# Patient Record
Sex: MALE | Race: WHITE | NOT HISPANIC OR LATINO | Employment: FULL TIME | URBAN - METROPOLITAN AREA
[De-identification: names, ages, dates, MRNs, and addresses within clinical notes are randomized per-mention and may not be internally consistent; named-entity substitution may affect disease eponyms.]

---

## 2017-04-10 ENCOUNTER — THERAPY VISIT (OUTPATIENT)
Dept: PHYSICAL THERAPY | Facility: CLINIC | Age: 57
End: 2017-04-10
Payer: COMMERCIAL

## 2017-04-10 DIAGNOSIS — M54.41 ACUTE RIGHT-SIDED LOW BACK PAIN WITH RIGHT-SIDED SCIATICA: Primary | ICD-10-CM

## 2017-04-10 PROCEDURE — 97110 THERAPEUTIC EXERCISES: CPT | Mod: GP | Performed by: PHYSICAL THERAPIST

## 2017-04-10 PROCEDURE — 97161 PT EVAL LOW COMPLEX 20 MIN: CPT | Mod: GP | Performed by: PHYSICAL THERAPIST

## 2017-04-10 NOTE — PROGRESS NOTES
[facility] Initial Evaluation    Subjective:    Farhat Tejada is a 56 year old male with a lumbar condition.  Condition occurred with:  Insidious onset.  Condition occurred: for unknown reasons.  This is a new condition  Patient reports onset of low back pain and right leg numbness/tingling on or about 3/10/17 with no precipitating event at onset. He reports that he just woke up with the pain one morning. Most recently saw MD for this problem on 4/7/17.    Patient reports pain:  Lumbar spine right.  Radiates to:  Gluteals right, thigh right, lower leg right, knee right and foot right.  Pain is described as sharp and is intermittent and reported as 3/10 (up to 8-9/10 with activity).  Associated symptoms:  Tingling and numbness. Pain is the same all the time.  Symptoms are exacerbated by certain positions, other and walking (sit to stand transition) and relieved by nothing.  Since onset symptoms are gradually improving.  Special tests:  X-ray.  Previous treatment includes other (prednisone).  There was moderate improvement following previous treatment.  General health as reported by patient is good.                                              Objective:    System         Lumbar/SI Evaluation    Lumbar Myotomes:  normal                  Neural Tension/Mobility:      Left side:Slump  negative.     Right side:   Slump  negative.   Lumbar Palpation:  normal          Spinal Segmental Conclusions: Segmental hypomobility T10-L5                                                         Lu Lumbar Evaluation    Posture:  Sitting: poor  Standing: fair  Lordosis: WNL  Lateral Shift: no  Correction of Posture: no effect    Movement Loss:  Flexion (Flex): min and pain  Extension (EXT): mod  Side Glide R (SG R): mod and pain  Side Glide L (SG L): mod and pain  Test Movements:  FIS: During: increases  After: no worse    Repeat FIS: During: no effect  After: worse  Mechanical Response: IncROM  EIS: During: no  effect  After: no effect    Repeat EIS: During: no effect  After: no effect  Mechanical Response: IncROM    EIL: During: increases  After: no worse    Repeat EIL: During: decreases and centralizing  After: better and centralizing  Mechanical Response: IncROM  SGIS R: During: increases  After: no worse      SGIS L: During: increases and peripheralizing  After: no worse                                                 ROS    Assessment/Plan:      Patient is a 56 year old male with lumbar complaints.    Patient has the following significant findings with corresponding treatment plan.                Diagnosis 1:  Low back pain    Pain -  self management, education, directional preference exercise and home program  Decreased ROM/flexibility - manual therapy, therapeutic exercise and home program  Decreased joint mobility - manual therapy, therapeutic exercise and home program  Impaired muscle performance - neuro re-education and home program  Decreased function - therapeutic activities and home program  Impaired posture - neuro re-education and home program    Therapy Evaluation Codes:   1) History comprised of:   Personal factors that impact the plan of care:      None.    Comorbidity factors that impact the plan of care are:      None.     Medications impacting care: None.  2) Examination of Body Systems comprised of:   Body structures and functions that impact the plan of care:      Lumbar spine.   Activity limitations that impact the plan of care are:      Sitting, Walking and Sit to stand.  3) Clinical presentation characteristics are:   Stable/Uncomplicated.  4) Decision-Making    Low complexity using standardized patient assessment instrument and/or measureable assessment of functional outcome.  Cumulative Therapy Evaluation is: Low complexity.    Previous and current functional limitations:  (See Goal Flow Sheet for this information)    Short term and Long term goals: (See Goal Flow Sheet for this information)      Communication ability:  Patient appears to be able to clearly communicate and understand verbal and written communication and follow directions correctly.  Treatment Explanation - The following has been discussed with the patient:   RX ordered/plan of care  Anticipated outcomes  Possible risks and side effects  This patient would benefit from PT intervention to resume normal activities.   Rehab potential is good.    Frequency:  1 X week, once daily  Duration:  for 6 weeks  Discharge Plan:  Achieve all LTG.  Independent in home treatment program.  Reach maximal therapeutic benefit.    Please refer to the daily flowsheet for treatment today, total treatment time and time spent performing 1:1 timed codes.

## 2017-04-10 NOTE — LETTER
[facility] PHYSICAL THERAPY  [address] [address]  [phone]    2017    Re: Farhat Tejada   :   1960  MRN:  [id number]   [facility]IC[facility]    Date of Initial Evaluation:  4/10/2017  Visits:  Rxs Used: 1  Reason for Referral:  Acute right-sided low back pain with right-sided sciatica    EVALUATION SUMMARY    [facility] Initial Evaluation    Subjective:  Farhat Tejada is a 56 year old male with a lumbar condition.  Condition occurred with:  Insidious onset.  Condition occurred: for unknown reasons.  This is a new condition  Patient reports onset of low back pain and right leg numbness/tingling on or about 3/10/17 with no precipitating event at onset. He reports that he just woke up with the pain one morning. Most recently saw MD for this problem on 17.  Patient reports pain:  Lumbar spine right.  Radiates to:  Gluteals right, thigh right, lower leg right, knee right and foot right.  Pain is described as sharp and is intermittent and reported as 3/10 (up to 8-9/10 with activity).  Associated symptoms:  Tingling and numbness. Pain is the same all the time.  Symptoms are exacerbated by certain positions, other and walking (sit to stand transition) and relieved by nothing.  Since onset symptoms are gradually improving.  Special tests:  X-ray.  Previous treatment includes other (prednisone).  There was moderate improvement following previous treatment.  General health as reported by patient is good.                            Objective:  Lumbar/SI Evaluation  Lumbar Myotomes:  normal  Neural Tension/Mobility:    Left side:Slump  negative.   Right side:   Slump  negative.   Lumbar Palpation:  normal  Spinal Segmental Conclusions: Segmental hypomobility T10-L5  Lu Lumbar Evaluation  Posture:  Sitting: poor  Standing: fair  Lordosis: WNL  Lateral Shift: no  Correction of Posture: no  effect  Movement Loss:  Flexion (Flex): min and pain  Extension (EXT): mod  Side Glide R (SG R): mod and pain  Side Glide L (SG L): mod and pain  Test Movements:  FIS: During: increases  After: no worse    Repeat FIS: During: no effect  After: worse  Mechanical Response: IncROM  EIS: During: no effect  After: no effect    Repeat EIS: During: no effect  After: no effect  Mechanical Response: IncROM  EIL: During: increases  After: no worse    Repeat EIL: During: decreases and centralizing  After: better and centralizing  Mechanical Response: IncROM  SGIS R: During: increases  After: no worse    SGIS L: During: increases and peripheralizing  After: no worse      Assessment/Plan:    Patient is a 56 year old male with lumbar complaints.    Patient has the following significant findings with corresponding treatment plan.                Diagnosis 1:  Low back pain    Pain -  self management, education, directional preference exercise and home program  Decreased ROM/flexibility - manual therapy, therapeutic exercise and home program  Decreased joint mobility - manual therapy, therapeutic exercise and home program  Impaired muscle performance - neuro re-education and home program  Decreased function - therapeutic activities and home program  Impaired posture - neuro re-education and home program  Therapy Evaluation Codes:   1) History comprised of:   Personal factors that impact the plan of care:      None.    Comorbidity factors that impact the plan of care are:      None.     Medications impacting care: None.  2) Examination of Body Systems comprised of:   Body structures and functions that impact the plan of care:      Lumbar spine.   Activity limitations that impact the plan of care are:      Sitting, Walking and Sit to stand.  3) Clinical presentation characteristics are:   Stable/Uncomplicated.  4) Decision-Making   Low complexity using standardized patient assessment instrument and/or measureable assessment of  functional outcome.  Cumulative Therapy Evaluation is: Low complexity.  Previous and current functional limitations:  (See Goal Flow Sheet for this information)    Short term and Long term goals: (See Goal Flow Sheet for this information)   Communication ability:  Patient appears to be able to clearly communicate and understand verbal and written communication and follow directions correctly.  Treatment Explanation - The following has been discussed with the patient:   RX ordered/plan of care  Anticipated outcomes  Possible risks and side effects  This patient would benefit from PT intervention to resume normal activities.   Rehab potential is good.  Frequency:  1 X week, once daily  Duration:  for 6 weeks  Discharge Plan:  Achieve all LTG.  Independent in home treatment program.  Reach maximal therapeutic benefit.         Thank you for your referral.    INQUIRIES  Therapist: Nuha ORTIZ [facility] PHYSICAL THERAPY  [address]  [facility] [address]  Phone: [phone]  Fax: [phone]

## 2017-04-13 ENCOUNTER — THERAPY VISIT (OUTPATIENT)
Dept: PHYSICAL THERAPY | Facility: CLINIC | Age: 57
End: 2017-04-13
Payer: COMMERCIAL

## 2017-04-13 DIAGNOSIS — M54.41 ACUTE RIGHT-SIDED LOW BACK PAIN WITH RIGHT-SIDED SCIATICA: ICD-10-CM

## 2017-04-13 PROCEDURE — 97110 THERAPEUTIC EXERCISES: CPT | Mod: GP | Performed by: PHYSICAL THERAPIST

## 2017-04-17 ENCOUNTER — THERAPY VISIT (OUTPATIENT)
Dept: PHYSICAL THERAPY | Facility: CLINIC | Age: 57
End: 2017-04-17
Payer: COMMERCIAL

## 2017-04-17 DIAGNOSIS — M54.41 ACUTE RIGHT-SIDED LOW BACK PAIN WITH RIGHT-SIDED SCIATICA: ICD-10-CM

## 2017-04-17 PROCEDURE — 97110 THERAPEUTIC EXERCISES: CPT | Mod: GP | Performed by: PHYSICAL THERAPIST

## 2017-05-04 ENCOUNTER — THERAPY VISIT (OUTPATIENT)
Dept: PHYSICAL THERAPY | Facility: CLINIC | Age: 57
End: 2017-05-04
Payer: COMMERCIAL

## 2017-05-04 DIAGNOSIS — M54.41 ACUTE RIGHT-SIDED LOW BACK PAIN WITH RIGHT-SIDED SCIATICA: ICD-10-CM

## 2017-05-04 PROCEDURE — 97110 THERAPEUTIC EXERCISES: CPT | Mod: GP | Performed by: PHYSICAL THERAPIST

## 2017-05-04 PROCEDURE — 97140 MANUAL THERAPY 1/> REGIONS: CPT | Mod: GP | Performed by: PHYSICAL THERAPIST

## 2017-05-11 ENCOUNTER — THERAPY VISIT (OUTPATIENT)
Dept: PHYSICAL THERAPY | Facility: CLINIC | Age: 57
End: 2017-05-11
Payer: COMMERCIAL

## 2017-05-11 DIAGNOSIS — M54.41 ACUTE RIGHT-SIDED LOW BACK PAIN WITH RIGHT-SIDED SCIATICA: ICD-10-CM

## 2017-05-11 PROCEDURE — 97110 THERAPEUTIC EXERCISES: CPT | Mod: GP | Performed by: PHYSICAL THERAPIST

## 2017-05-11 PROCEDURE — 97140 MANUAL THERAPY 1/> REGIONS: CPT | Mod: GP | Performed by: PHYSICAL THERAPIST

## 2017-05-18 ENCOUNTER — THERAPY VISIT (OUTPATIENT)
Dept: PHYSICAL THERAPY | Facility: CLINIC | Age: 57
End: 2017-05-18
Payer: COMMERCIAL

## 2017-05-18 DIAGNOSIS — M54.41 ACUTE RIGHT-SIDED LOW BACK PAIN WITH RIGHT-SIDED SCIATICA: ICD-10-CM

## 2017-05-18 PROCEDURE — 97110 THERAPEUTIC EXERCISES: CPT | Mod: GP | Performed by: PHYSICAL THERAPIST

## 2017-05-18 PROCEDURE — 97112 NEUROMUSCULAR REEDUCATION: CPT | Mod: GP | Performed by: PHYSICAL THERAPIST

## 2017-05-22 ENCOUNTER — THERAPY VISIT (OUTPATIENT)
Dept: PHYSICAL THERAPY | Facility: CLINIC | Age: 57
End: 2017-05-22
Payer: COMMERCIAL

## 2017-05-22 DIAGNOSIS — M54.41 ACUTE RIGHT-SIDED LOW BACK PAIN WITH RIGHT-SIDED SCIATICA: ICD-10-CM

## 2017-05-22 PROCEDURE — 97110 THERAPEUTIC EXERCISES: CPT | Mod: GP | Performed by: PHYSICAL THERAPIST

## 2017-05-22 PROCEDURE — 97140 MANUAL THERAPY 1/> REGIONS: CPT | Mod: GP | Performed by: PHYSICAL THERAPIST

## 2017-05-22 NOTE — PROGRESS NOTES
Subjective:    HPI                    Objective:    System    Physical Exam    General     ROS    Assessment/Plan:      PROGRESS  REPORT    Progress reporting period is from 4/10/17 to 5/18/17.       SUBJECTIVE  Subjective changes noted by patient:  Patient reports his pain has been better this week in the leg; he reports he does feel weakness in the legs, especially when he tried running ealier this week. Patient reports increased compliance with repeated extension exercise and has been able to complete this more frequently throughout the day    Current Pain level: 4/10.     Initial Pain level: 3/10.   Changes in function:  Yes (See Goal flowsheet attached for changes in current functional level)  Adverse reaction to treatment or activity: None    OBJECTIVE  Changes noted in objective findings: AROM lumbar flexion Min loss, extension Mod loss. Patient continues to demonstrate with forward flexed posture in sitting, but is improved from beginning of POC. He does have increase pain that peripheralizes with overcorrect position during postural correction and is relieved with slight slump.     ASSESSMENT/PLAN  Updated problem list and treatment plan: Diagnosis 1:  Low back pain    Pain -  self management, education, directional preference exercise and home program  Decreased ROM/flexibility - manual therapy, therapeutic exercise and home program  Impaired muscle performance - neuro re-education and home program  Decreased function - therapeutic activities and home program  Impaired posture - neuro re-education and home program  STG/LTGs have been met or progress has been made towards goals:  Yes (See Goal flow sheet completed today.)  Assessment of Progress: The patient's condition is improving.  Self Management Plans:  Patient has been instructed in a home treatment program.  Patient  has been instructed in self management of symptoms.  I have re-evaluated this patient and find that the nature, scope, duration and  intensity of the therapy is appropriate for the medical condition of the patient.  Farhat continues to require the following intervention to meet STG and LTG's:  PT    Recommendations:  This patient would benefit from continued therapy.     Frequency:  1 X week, once daily  Duration:  for 2 weeks tapering to 2 X a month over 4 weeks        Please refer to the daily flowsheet for treatment today, total treatment time and time spent performing 1:1 timed codes.

## 2017-05-25 ENCOUNTER — THERAPY VISIT (OUTPATIENT)
Dept: PHYSICAL THERAPY | Facility: CLINIC | Age: 57
End: 2017-05-25
Payer: COMMERCIAL

## 2017-05-25 DIAGNOSIS — M54.41 ACUTE RIGHT-SIDED LOW BACK PAIN WITH RIGHT-SIDED SCIATICA: ICD-10-CM

## 2017-05-25 PROCEDURE — 97140 MANUAL THERAPY 1/> REGIONS: CPT | Mod: GP | Performed by: PHYSICAL THERAPIST

## 2017-05-25 PROCEDURE — 97110 THERAPEUTIC EXERCISES: CPT | Mod: GP | Performed by: PHYSICAL THERAPIST

## 2017-06-20 ENCOUNTER — OFFICE VISIT (OUTPATIENT)
Dept: FAMILY MEDICINE | Facility: CLINIC | Age: 57
End: 2017-06-20
Payer: COMMERCIAL

## 2017-06-20 VITALS
TEMPERATURE: 98 F | HEART RATE: 93 BPM | HEIGHT: 70 IN | WEIGHT: 212.4 LBS | BODY MASS INDEX: 30.41 KG/M2 | DIASTOLIC BLOOD PRESSURE: 85 MMHG | OXYGEN SATURATION: 100 % | SYSTOLIC BLOOD PRESSURE: 127 MMHG

## 2017-06-20 DIAGNOSIS — Z09 HOSPITAL DISCHARGE FOLLOW-UP: Primary | ICD-10-CM

## 2017-06-20 DIAGNOSIS — K21.9 GASTROESOPHAGEAL REFLUX DISEASE WITHOUT ESOPHAGITIS: ICD-10-CM

## 2017-06-20 DIAGNOSIS — Z71.89 ADVANCED DIRECTIVES, COUNSELING/DISCUSSION: ICD-10-CM

## 2017-06-20 PROCEDURE — 99214 OFFICE O/P EST MOD 30 MIN: CPT | Performed by: NURSE PRACTITIONER

## 2017-06-20 NOTE — PROGRESS NOTES
"  SUBJECTIVE:                                                    Farhat Tejada is a 56 year old male who presents to clinic today for the following health issues:      ED/UC Followup:    Facility:  [facility]  Date of visit: 6/18/17  Reason for visit: chest pain; dx Acid reflux  Current Status: discharged-no pain       Problem list and histories reviewed & adjusted, as indicated.  Additional history: as documented    Patient Active Problem List   Diagnosis     Acute right-sided low back pain with right-sided sciatica     Advanced directives, counseling/discussion     History reviewed. No pertinent surgical history.    Social History   Substance Use Topics     Smoking status: Never Smoker     Smokeless tobacco: Not on file     Alcohol use Yes     Family History   Problem Relation Age of Onset     Hypertension Mother      DIABETES Father      Hypertension Father          No current outpatient prescriptions on file.     No Known Allergies  BP Readings from Last 3 Encounters:   06/20/17 127/85    Wt Readings from Last 3 Encounters:   06/20/17 212 lb 6.4 oz (96.3 kg)             Labs reviewed in EPIC    Reviewed and updated as needed this visit by clinical staff  Tobacco  Allergies  Meds  Med Hx  Surg Hx  Fam Hx  Soc Hx      Reviewed and updated as needed this visit by Provider         ROS:  Constitutional, HEENT, cardiovascular, pulmonary, GI, , musculoskeletal, neuro, skin, endocrine and psych systems are negative, except as otherwise noted.    OBJECTIVE:                                                    /85  Pulse 93  Temp 98  F (36.7  C) (Oral)  Ht 5' 10.28\" (1.785 m)  Wt 212 lb 6.4 oz (96.3 kg)  SpO2 100%  BMI 30.24 kg/m2  Body mass index is 30.24 kg/(m^2).  GENERAL: healthy, alert and no distress  RESP: lungs clear to auscultation - no rales, rhonchi or wheezes  CV: regular rate and rhythm, normal S1 S2, no S3 or S4, no murmur, click or rub, no peripheral edema and peripheral pulses " strong  PSYCH: mentation appears normal, affect normal/bright    Diagnostic Test Results:  none      ASSESSMENT/PLAN:                                                      Discussed EKG-vs- Stress test, symptoms consistent with GERD, pt will let provider know if he decides to have additional testing and discussed with him at hospital discharge. Pt is leaving for [facility] in 2 days. Discussed follow up for any worsening symptoms: chest pain, SOB, fatigue, nausea, jaw pain, arm pain.       ICD-10-CM    1. Hospital discharge follow-up Z09    2. Advanced directives, counseling/discussion Z71.89    3. Gastroesophageal reflux disease without esophagitis K21.9        See Patient Instructions    EDGAR Brody  [facility]

## 2017-06-20 NOTE — NURSING NOTE
"Chief Complaint   Patient presents with     Hospital F/U       Initial /85  Pulse 93  Temp 98  F (36.7  C) (Oral)  Ht 5' 10.28\" (1.785 m)  Wt 212 lb 6.4 oz (96.3 kg)  SpO2 100%  BMI 30.24 kg/m2 Estimated body mass index is 30.24 kg/(m^2) as calculated from the following:    Height as of this encounter: 5' 10.28\" (1.785 m).    Weight as of this encounter: 212 lb 6.4 oz (96.3 kg).  Medication Reconciliation: complete     Leticia Almaraz MA  "

## 2017-06-20 NOTE — PATIENT INSTRUCTIONS
Tips to Control Acid Reflux    To control acid reflux, you ll need to make some basic diet and lifestyle changes. The simple steps outlined below may be all you ll need to ease discomfort.  Watch what you eat    Avoid fatty foods and spicy foods.    Eat fewer acidic foods, such as citrus and tomato-based foods. These can increase symptoms.    Limit drinking alcohol, caffeine, and fizzy beverages. All increase acid reflux.    Try limiting chocolate, peppermint, and spearmint. These can worsen acid reflux in some people.  Watch when you eat    Avoid lying down for 3 hours after eating.    Do not snack before going to bed.  Raise your head  Raising your head and upper body by 4 to 6 inches helps limit reflux when you re lying down. Put blocks under the head of your bed frame to raise it.  Other changes    Lose weight, if you need to    Don t exercise near bedtime    Avoid tight-fitting clothes    Limit aspirin and ibuprofen    Stop smoking   Date Last Reviewed: 7/1/2016 2000-2017 [employer]. [address] [place] [address], [place] [address]. All rights reserved. This information is not intended as a substitute for professional medical care. Always follow your healthcare professional's instructions.        Medicines for Acid Reflux  Your healthcare provider has told you that you have acid reflux. This condition causes stomach acid to wash up into your throat. For most people, acid reflux is troubling but not dangerous. But left untreated, acid reflux sometimes damages the esophagus. Medicines can help control acid reflux and limit your risk of future problems.  Medicines for acid reflux  Your healthcare provider may prescribe medicine to help treat your acid reflux. Medicine will be based on your symptoms and any test results. Your provider will explain how to take your medicine. You will also be told about possible side effects.  Reducing stomach acid  Your provider may suggest antacids that you can buy  over the counter. Antacids can give fast relief. Or you may be told to take a type of medicine called H2 blockers. These are available over the counter and by prescription (for higher doses).  Blocking stomach acid  In more severe cases, your healthcare provider may suggest stronger medicines such as proton pump inhibitors (PPIs). These keep the stomach from making acid. They are often prescribed for long-term use.  Other medicines  In some cases medicines to reduce or block stomach acid may not work. Then you may be switched to another type of medicine that helps your stomach empty better.     Date Last Reviewed: 10/1/2016    [id number] [employer]. [address] [place] [address], [place] [address]. All rights reserved. This information is not intended as a substitute for professional medical care. Always follow your healthcare professional's instructions.

## 2017-06-20 NOTE — Clinical Note
Please abstract the following data from this visit with this patient into the appropriate field in Epic:  Colonoscopy done on this date: 9/13/10 (approximately), by this group: [facility], results were in care everywhere.  Lipid: 6/2/16 Hep c antibody: 6/2/16

## 2017-06-20 NOTE — MR AVS SNAPSHOT
After Visit Summary   6/20/2017    Farhat Tejada    MRN: [id number]           Patient Information     Date Of Birth          1960        Visit Information        Provider Department      6/20/2017 4:00 PM Chelsea Griffin NP [facility]        Today's Diagnoses     Hospital discharge follow-up    -  1    Advanced directives, counseling/discussion        Gastroesophageal reflux disease without esophagitis          Care Instructions      Tips to Control Acid Reflux    To control acid reflux, you ll need to make some basic diet and lifestyle changes. The simple steps outlined below may be all you ll need to ease discomfort.  Watch what you eat    Avoid fatty foods and spicy foods.    Eat fewer acidic foods, such as citrus and tomato-based foods. These can increase symptoms.    Limit drinking alcohol, caffeine, and fizzy beverages. All increase acid reflux.    Try limiting chocolate, peppermint, and spearmint. These can worsen acid reflux in some people.  Watch when you eat    Avoid lying down for 3 hours after eating.    Do not snack before going to bed.  Raise your head  Raising your head and upper body by 4 to 6 inches helps limit reflux when you re lying down. Put blocks under the head of your bed frame to raise it.  Other changes    Lose weight, if you need to    Don t exercise near bedtime    Avoid tight-fitting clothes    Limit aspirin and ibuprofen    Stop smoking   Date Last Reviewed: 7/1/2016 2000-2017 [employer]. [address] [place] [address], [place] [address]. All rights reserved. This information is not intended as a substitute for professional medical care. Always follow your healthcare professional's instructions.        Medicines for Acid Reflux  Your healthcare provider has told you that you have acid reflux. This condition causes stomach acid to wash up into your throat. For most people, acid reflux is troubling but not dangerous. But left untreated, acid  reflux sometimes damages the esophagus. Medicines can help control acid reflux and limit your risk of future problems.  Medicines for acid reflux  Your healthcare provider may prescribe medicine to help treat your acid reflux. Medicine will be based on your symptoms and any test results. Your provider will explain how to take your medicine. You will also be told about possible side effects.  Reducing stomach acid  Your provider may suggest antacids that you can buy over the counter. Antacids can give fast relief. Or you may be told to take a type of medicine called H2 blockers. These are available over the counter and by prescription (for higher doses).  Blocking stomach acid  In more severe cases, your healthcare provider may suggest stronger medicines such as proton pump inhibitors (PPIs). These keep the stomach from making acid. They are often prescribed for long-term use.  Other medicines  In some cases medicines to reduce or block stomach acid may not work. Then you may be switched to another type of medicine that helps your stomach empty better.     Date Last Reviewed: 10/1/2016    6424-6452 [employer]. [address] [place] [address]. All rights reserved. This information is not intended as a substitute for professional medical care. Always follow your healthcare professional's instructions.                Follow-ups after your visit        Follow-up notes from your care team     Return if symptoms worsen or fail to improve.      Who to contact     Normal or non-critical lab and imaging results will be communicated to you by ELVPHDhart, letter or phone within 4 business days after the clinic has received the results. If you do not hear from us within 7 days, please contact the clinic through ELVPHDhart or phone. If you have a critical or abnormal lab result, we will notify you by phone as soon as possible.  Submit refill requests through [employer] Net or call your pharmacy and they will forward  "the refill request to us. Please allow 3 business days for your refill to be completed.          If you need to speak with a  for additional information , please call: [phone]             Additional Information About Your Visit        [email or web address] Information     [email or web address] lets you send messages to your doctor, view your test results, renew your prescriptions, schedule appointments and more. To sign up, go to [email or web address] . Click on \"Log in\" on the left side of the screen, which will take you to the Welcome page. Then click on \"Sign up Now\" on the right side of the page.     You will be asked to enter the access code listed below, as well as some personal information. Please follow the directions to create your username and password.     Your access code is: [email or web address]  Expires: 2017  4:07 PM     Your access code will  in 90 days. If you need help or a new code, please call your [facility] clinic or [phone].        Care EveryWhere ID     This is your Care EveryWhere ID. This could be used by other organizations to access your [facility] medical records  [id number]        Your Vitals Were     Pulse Temperature Height Pulse Oximetry BMI (Body Mass Index)       93 98  F (36.7  C) (Oral) 5' 10.28\" (1.785 m) 100% 30.24 kg/m2        Blood Pressure from Last 3 Encounters:   17 127/85    Weight from Last 3 Encounters:   17 212 lb 6.4 oz (96.3 kg)              Today, you had the following     No orders found for display       Primary Care Provider Office Phone #    Veronica Curtis [place] [phone]       No address on file        Thank you!     Thank you for choosing [facility]  for your care. Our goal is always to provide you with excellent care. Hearing back from our patients is one way we can continue to improve our services. Please take a few minutes to complete the written survey that you may receive in the mail after your visit with us. Thank you!   "           Your Updated Medication List - Protect others around you: Learn how to safely use, store and throw away your medicines at www.disposemymeds.org.      Notice  As of 6/20/2017  4:18 PM    You have not been prescribed any medications.

## 2018-06-18 PROBLEM — M54.41 ACUTE RIGHT-SIDED LOW BACK PAIN WITH RIGHT-SIDED SCIATICA: Status: RESOLVED | Noted: 2017-04-10 | Resolved: 2018-06-18

## 2018-06-18 NOTE — PROGRESS NOTES
Subjective:  HPI                    Objective:  System    Physical Exam    General     ROS    Assessment/Plan:    DISCHARGE REPORT    Progress reporting period as of 5/25/17    SUBJECTIVE  Subjective: Pt reports his pain level is decreased overall from a few weeks ago; does get the pain shooting down the leg when he has to walk/stand for a long time; patient reports he has been trying to work on his posture but does find this difficult because it is an unnatural position for him   Current Pain level: 1/10   Initial Pain level: 3/10   Changes in function: No changes noted in function since last SOAP note   Adverse reactions: None;   ,     Patient has failed to return to therapy so current objective findings are unknown.  The subjective and objective information are from the last SOAP note on this patient.    OBJECTIVE  Objective: AROM lumbar flexion Min loss, extension Mod loss. Patient able to maintain neutral spine position, but does require cues to initially attain this position      ASSESSMENT/PLAN  Updated problem list and treatment plan: Diagnosis 1:   Low back pain    Pain -  home program  Decreased ROM/flexibility - home program  STG/LTGs have been met or progress has been made towards goals:  Yes (See Goal flow sheet completed today.)  Assessment of Progress: The patient has not returned to therapy. Current status is unknown.  Self Management Plans:  Patient has been instructed in a home treatment program.  Patient is independent in a home treatment program.  Patient  has been instructed in self management of symptoms.  Patient is independent in self management of symptoms.  I have re-evaluated this patient and find that the nature, scope, duration and intensity of the therapy is appropriate for the medical condition of the patient.  Farhat continues to require the following intervention to meet STG and LTG's: PT intervention is no longer required to meet STG/LTG.  The patient failed to complete  scheduled/ordered appointments so current information is unknown.  We will discharge this patient from PT.    Recommendations:  This patient is ready to be discharged from therapy and continue their home treatment program.    Please refer to the daily flowsheet for treatment today, total treatment time and time spent performing 1:1 timed codes.

## 2018-08-27 ENCOUNTER — OFFICE VISIT (OUTPATIENT)
Dept: FAMILY MEDICINE | Facility: CLINIC | Age: 58
End: 2018-08-27
Payer: COMMERCIAL

## 2018-08-27 VITALS
OXYGEN SATURATION: 96 % | DIASTOLIC BLOOD PRESSURE: 88 MMHG | HEIGHT: 71 IN | WEIGHT: 218.4 LBS | BODY MASS INDEX: 30.57 KG/M2 | HEART RATE: 75 BPM | SYSTOLIC BLOOD PRESSURE: 124 MMHG | TEMPERATURE: 98.1 F

## 2018-08-27 DIAGNOSIS — R25.2 BILATERAL LEG CRAMPS: ICD-10-CM

## 2018-08-27 DIAGNOSIS — H91.93 DECREASED HEARING OF BOTH EARS: ICD-10-CM

## 2018-08-27 DIAGNOSIS — Z13.1 SCREENING FOR DIABETES MELLITUS: ICD-10-CM

## 2018-08-27 DIAGNOSIS — Z00.00 ROUTINE GENERAL MEDICAL EXAMINATION AT A HEALTH CARE FACILITY: Primary | ICD-10-CM

## 2018-08-27 DIAGNOSIS — H61.21 IMPACTED CERUMEN OF RIGHT EAR: ICD-10-CM

## 2018-08-27 DIAGNOSIS — E66.811 OBESITY (BMI 30.0-34.9): ICD-10-CM

## 2018-08-27 DIAGNOSIS — Z13.6 CARDIOVASCULAR SCREENING; LDL GOAL LESS THAN 160: ICD-10-CM

## 2018-08-27 DIAGNOSIS — R06.83 SNORING: ICD-10-CM

## 2018-08-27 PROCEDURE — 99386 PREV VISIT NEW AGE 40-64: CPT | Mod: 25 | Performed by: PHYSICIAN ASSISTANT

## 2018-08-27 PROCEDURE — 99213 OFFICE O/P EST LOW 20 MIN: CPT | Mod: 25 | Performed by: PHYSICIAN ASSISTANT

## 2018-08-27 PROCEDURE — 69209 REMOVE IMPACTED EAR WAX UNI: CPT | Mod: RT | Performed by: PHYSICIAN ASSISTANT

## 2018-08-27 PROCEDURE — 92551 PURE TONE HEARING TEST AIR: CPT | Performed by: PHYSICIAN ASSISTANT

## 2018-08-27 NOTE — PROGRESS NOTES
SUBJECTIVE:   CC: Farhat Tejada is an 58 year old male who presents for preventative health visit.     Healthy Habits:    Do you get at least three servings of calcium containing foods daily (dairy, green leafy vegetables, etc.)? yes    Amount of exercise or daily activities, outside of work: minimal     Problems taking medications regularly not applicable    Medication side effects: No    Have you had an eye exam in the past two years? no    Do you see a dentist twice per year? yes    Do you have sleep apnea, excessive snoring or daytime drowsiness?yes, he does snore.  No apneic episodes noted.  He did an overnight sleep study at home and was advised to f/u in the lab for further testing.  He does feel well rested most mornings.           His wife is concerned about his hearing.  He thinks he hears just fine, however he would like testing to ensure      HEARING FREQUENCY    Right Ear:      1000 Hz RESPONSE- on Level: 40 db (Conditioning sound)   1000 Hz: RESPONSE- on Level:   20 db    2000 Hz: RESPONSE- on Level:   25 db    4000 Hz: RESPONSE- on Level:   25 db     Left Ear:      4000 Hz: RESPONSE- on Level: 30 db   2000 Hz: RESPONSE- on Level: 30 db   1000 Hz: RESPONSE- on Level:   25 db     500 Hz: RESPONSE- on Level:   25 db     Right Ear:    500 Hz: RESPONSE- on Level: 25 db    Hearing Acuity: Pass    Hearing Assessment: normal      He c/o bilateral leg cramps off and on for over a year.  Has tried drinking more water.  Tried a multivitamin without improvement.      Today's PHQ-2 Score:   PHQ-2 ( 1999 Pfizer) 8/27/2018   Q1: Little interest or pleasure in doing things 0   Q2: Feeling down, depressed or hopeless 0   PHQ-2 Score 0       Abuse: Current or Past(Physical, Sexual or Emotional)- No  Do you feel safe in your environment - Yes    Social History   Substance Use Topics     Smoking status: Never Smoker     Smokeless tobacco: Never Used     Alcohol use Yes      If you drink alcohol do you typically have >3  "drinks per day or >7 drinks per week? No                      Last PSA: No results found for: PSA    Reviewed orders with patient. Reviewed health maintenance and updated orders accordingly - Yes      Reviewed and updated as needed this visit by clinical staff  Tobacco  Allergies  Meds  Med Hx  Surg Hx  Fam Hx  Soc Hx        Reviewed and updated as needed this visit by Provider            ROS:  CONSTITUTIONAL: NEGATIVE for fever, chills, change in weight  INTEGUMENTARY/SKIN: NEGATIVE for worrisome rashes, moles or lesions  EYES: NEGATIVE for vision changes or irritation  ENT: NEGATIVE for ear, mouth and throat problems  RESP: NEGATIVE for significant cough or SOB  CV: NEGATIVE for chest pain, palpitations or peripheral edema  GI: NEGATIVE for nausea, abdominal pain, heartburn, or change in bowel habits   male: negative for dysuria, hematuria, decreased urinary stream, erectile dysfunction, urethral discharge  MUSCULOSKELETAL: NEGATIVE for significant arthralgias or myalgia  NEURO: NEGATIVE for weakness, dizziness or paresthesias  PSYCHIATRIC: NEGATIVE for changes in mood or affect    OBJECTIVE:   /88 (BP Location: Right arm, Cuff Size: Adult Regular)  Pulse 75  Temp 98.1  F (36.7  C) (Tympanic)  Ht 5' 10.75\" (1.797 m)  Wt 218 lb 6.4 oz (99.1 kg)  SpO2 96%  BMI 30.68 kg/m2  EXAM:  GENERAL: healthy, alert and no distress  EYES: Eyes grossly normal to inspection, PERRL and conjunctivae and sclerae normal  HENT: right ear canal with impacted cerumen, left canal normal and TM's normal, nose and mouth without ulcers or lesions  NECK: no adenopathy, no asymmetry, masses, or scars and thyroid normal to palpation  RESP: lungs clear to auscultation - no rales, rhonchi or wheezes  CV: regular rate and rhythm, normal S1 S2, no S3 or S4, no murmur, click or rub, no peripheral edema and peripheral pulses strong  ABDOMEN: soft, nontender, no hepatosplenomegaly, no masses and bowel sounds normal  MS: no gross " "musculoskeletal defects noted, no edema  SKIN: no suspicious lesions or rashes  NEURO: Normal strength and tone, mentation intact and speech normal  PSYCH: mentation appears normal, affect normal/bright    Diagnostic Test Results:  none     ASSESSMENT/PLAN:   1. Routine general medical examination at a health care facility       HEALTH CARE MAINTENANCE              Reviewed USPTF recommendations and anticipatory guidance.              See orders.   Due for shingles vaccine, will stop by pharmacy to get that done.      - Comprehensive metabolic panel; Future    2. Bilateral leg cramps  Will check electrolytes.  I suggest drinking plenty of water and stretching calves in am and pm.   - Magnesium; Future  - Comprehensive metabolic panel; Future    3. CARDIOVASCULAR SCREENING; LDL GOAL LESS THAN 160  Due for screening.   - Lipid panel reflex to direct LDL Fasting; Future    4. Screening for diabetes mellitus  Due for screening.   - Comprehensive metabolic panel; Future    5. Snoring  Suggest further evaluation with a sleep study.   - SLEEP EVALUATION & MANAGEMENT REFERRAL - Critical access hospital -[facility] [place]  [phone] (Age 15 and up); Future    6. Obesity (BMI 30.0-34.9)  Weight loss advised.     7. Impacted cerumen of right ear  Cerumen Impaction    Wax successfully removed from right ear(s) using irrigation.   Hearing improved s/p wax removal.  Tympanic membranes intact s/p wax removal.   Can use OTC debrox PRN in future.        8.  Decreased hearing.  Passed hearing exam.  Wax removed, this may help.       COUNSELING:  Reviewed preventive health counseling, as reflected in patient instructions       Regular exercise       Healthy diet/nutrition       Hearing screening       Colon cancer screening    BP Readings from Last 1 Encounters:   08/27/18 124/88     Estimated body mass index is 30.68 kg/(m^2) as calculated from the following:    Height as of this encounter: 5' 10.75\" (1.797 m).    Weight as of " this encounter: 218 lb 6.4 oz (99.1 kg).      Weight management plan: Discussed healthy diet and exercise guidelines and patient will follow up in 12 months in clinic to re-evaluate.     reports that he has never smoked. He has never used smokeless tobacco.      Counseling Resources:  ATP IV Guidelines  Pooled Cohorts Equation Calculator  FRAX Risk Assessment  ICSI Preventive Guidelines  Dietary Guidelines for Americans, 2010  USDA's MyPlate  ASA Prophylaxis  Lung CA Screening    Helena Magaña PA-C  [facility]

## 2018-08-27 NOTE — MR AVS SNAPSHOT
After Visit Summary   8/27/2018    Farhat Tejada    MRN: [id number]           Patient Information     Date Of Birth          1960        Visit Information        Provider Department      8/27/2018 9:00 AM Helena Magaña PA-C [facility]        Today's Diagnoses     Routine general medical examination at a health care facility    -  1    Bilateral leg cramps        CARDIOVASCULAR SCREENING; LDL GOAL LESS THAN 160        Screening for diabetes mellitus        Snoring          Care Instructions      Preventive Health Recommendations  Male Ages 50 - 64    Yearly exam:             See your health care provider every year in order to  o   Review health changes.   o   Discuss preventive care.    o   Review your medicines if your doctor has prescribed any.     Have a cholesterol test every 5 years, or more frequently if you are at risk for high cholesterol/heart disease.     Have a diabetes test (fasting glucose) every three years. If you are at risk for diabetes, you should have this test more often.     Have a colonoscopy at age 50, or have a yearly FIT test (stool test). These exams will check for colon cancer.      Talk with your health care provider about whether or not a prostate cancer screening test (PSA) is right for you.    You should be tested each year for STDs (sexually transmitted diseases), if you re at risk.     Shots: Get a flu shot each year. Get a tetanus shot every 10 years.     Nutrition:    Eat at least 5 servings of fruits and vegetables daily.     Eat whole-grain bread, whole-wheat pasta and brown rice instead of white grains and rice.     Get adequate Calcium and Vitamin D.     Lifestyle    Exercise for at least 150 minutes a week (30 minutes a day, 5 days a week). This will help you control your weight and prevent disease.     Limit alcohol to one drink per day.     No smoking.     Wear sunscreen to prevent skin cancer.     See your dentist every six months  for an exam and cleaning.     See your eye doctor every 1 to 2 years.    Schedule a sleep study.   Start the shingles vaccine series (2 doses)                Follow-ups after your visit        Additional Services     SLEEP EVALUATION & MANAGEMENT REFERRAL - [facility]  [phone] (Age 15 and up)       Please be aware that coverage of these services is subject to the terms and limitations of your health insurance plan.  Call member services at your health plan with any benefit or coverage questions.      Please bring the following to your appointment:    >>   List of current medications   >>   This referral request   >>   Any documents/labs given to you for this referral                      Future tests that were ordered for you today     Open Future Orders        Priority Expected Expires Ordered    Comprehensive metabolic panel Routine 8/28/2018 8/27/2019 8/27/2018    SLEEP EVALUATION & MANAGEMENT REFERRAL - [facility]  [phone] (Age 15 and up) Routine  8/27/2019 8/27/2018    Lipid panel reflex to direct LDL Fasting Routine 8/28/2018 8/27/2019 8/27/2018    Magnesium Routine 8/28/2018 8/27/2019 8/27/2018            Who to contact     Normal or non-critical lab and imaging results will be communicated to you by MyChart, letter or phone within 4 business days after the clinic has received the results. If you do not hear from us within 7 days, please contact the clinic through MyChart or phone. If you have a critical or abnormal lab result, we will notify you by phone as soon as possible.  Submit refill requests through PureS[employer]et or call your pharmacy and they will forward the refill request to us. Please allow 3 business days for your refill to be completed.          If you need to speak with a  for additional information , please call: [phone]           Additional Information About Your Visit        Care EveryWhere ID      "This is your Care EveryWhere ID. This could be used by other organizations to access your [facility] medical records  [id number]        Your Vitals Were     Pulse Temperature Height Pulse Oximetry BMI (Body Mass Index)       75 98.1  F (36.7  C) (Tympanic) 5' 10.75\" (1.797 m) 96% 30.68 kg/m2        Blood Pressure from Last 3 Encounters:   08/27/18 124/88   06/20/17 127/85    Weight from Last 3 Encounters:   08/27/18 218 lb 6.4 oz (99.1 kg)   06/20/17 212 lb 6.4 oz (96.3 kg)               Primary Care Provider Office Phone # Fax #    Helena Amber Magaña PA-C [phone] [address] [place] [address]        Equal Access to Services     [facility] EM : Hadii pedro yates hadasho Sonatalie, waaxda luqadaha, qaybta kaalmada adeegyada, mary duff . So [facility] [place] [phone].    ATENCIÓN: Si habla español, tiene a amador disposición servicios gratuitos de asistencia lingüística. Gretchename al [phone].    We comply with applicable federal civil rights laws and [place] laws. We do not discriminate on the basis of race, color, national origin, age, disability, sex, sexual orientation, or gender identity.            Thank you!     Thank you for choosing [facility] [place]  for your care. Our goal is always to provide you with excellent care. Hearing back from our patients is one way we can continue to improve our services. Please take a few minutes to complete the written survey that you may receive in the mail after your visit with us. Thank you!             Your Updated Medication List - Protect others around you: Learn how to safely use, store and throw away your medicines at www.disposemymeds.org.      Notice  As of 8/27/2018 10:02 AM    You have not been prescribed any medications.      "

## 2018-08-27 NOTE — PATIENT INSTRUCTIONS
Preventive Health Recommendations  Male Ages 50 - 64    Yearly exam:             See your health care provider every year in order to  o   Review health changes.   o   Discuss preventive care.    o   Review your medicines if your doctor has prescribed any.     Have a cholesterol test every 5 years, or more frequently if you are at risk for high cholesterol/heart disease.     Have a diabetes test (fasting glucose) every three years. If you are at risk for diabetes, you should have this test more often.     Have a colonoscopy at age 50, or have a yearly FIT test (stool test). These exams will check for colon cancer.      Talk with your health care provider about whether or not a prostate cancer screening test (PSA) is right for you.    You should be tested each year for STDs (sexually transmitted diseases), if you re at risk.     Shots: Get a flu shot each year. Get a tetanus shot every 10 years.     Nutrition:    Eat at least 5 servings of fruits and vegetables daily.     Eat whole-grain bread, whole-wheat pasta and brown rice instead of white grains and rice.     Get adequate Calcium and Vitamin D.     Lifestyle    Exercise for at least 150 minutes a week (30 minutes a day, 5 days a week). This will help you control your weight and prevent disease.     Limit alcohol to one drink per day.     No smoking.     Wear sunscreen to prevent skin cancer.     See your dentist every six months for an exam and cleaning.     See your eye doctor every 1 to 2 years.    Schedule a sleep study.   Start the shingles vaccine series (2 doses)

## 2018-09-05 ENCOUNTER — ALLIED HEALTH/NURSE VISIT (OUTPATIENT)
Dept: FAMILY MEDICINE | Facility: CLINIC | Age: 58
End: 2018-09-05
Payer: COMMERCIAL

## 2018-09-05 DIAGNOSIS — Z23 NEED FOR VACCINATION: Primary | ICD-10-CM

## 2018-09-05 DIAGNOSIS — Z00.00 ROUTINE GENERAL MEDICAL EXAMINATION AT A HEALTH CARE FACILITY: ICD-10-CM

## 2018-09-05 DIAGNOSIS — R25.2 BILATERAL LEG CRAMPS: ICD-10-CM

## 2018-09-05 DIAGNOSIS — R74.8 ELEVATED LIVER ENZYMES: ICD-10-CM

## 2018-09-05 DIAGNOSIS — E78.5 HYPERLIPIDEMIA LDL GOAL <160: ICD-10-CM

## 2018-09-05 DIAGNOSIS — Z13.1 SCREENING FOR DIABETES MELLITUS: ICD-10-CM

## 2018-09-05 DIAGNOSIS — Z13.6 CARDIOVASCULAR SCREENING; LDL GOAL LESS THAN 160: ICD-10-CM

## 2018-09-05 DIAGNOSIS — R73.03 PREDIABETES: Primary | ICD-10-CM

## 2018-09-05 LAB
ALBUMIN SERPL-MCNC: 4.3 G/DL (ref 3.4–5)
ALP SERPL-CCNC: 57 U/L (ref 40–150)
ALT SERPL W P-5'-P-CCNC: 100 U/L (ref 0–70)
ANION GAP SERPL CALCULATED.3IONS-SCNC: 4 MMOL/L (ref 3–14)
AST SERPL W P-5'-P-CCNC: 51 U/L (ref 0–45)
BILIRUB SERPL-MCNC: 1.8 MG/DL (ref 0.2–1.3)
BUN SERPL-MCNC: 15 MG/DL (ref 7–30)
CALCIUM SERPL-MCNC: 9.5 MG/DL (ref 8.5–10.1)
CHLORIDE SERPL-SCNC: 102 MMOL/L (ref 94–109)
CHOLEST SERPL-MCNC: 220 MG/DL
CO2 SERPL-SCNC: 30 MMOL/L (ref 20–32)
CREAT SERPL-MCNC: 1.07 MG/DL (ref 0.66–1.25)
GFR SERPL CREATININE-BSD FRML MDRD: 71 ML/MIN/1.7M2
GLUCOSE SERPL-MCNC: 115 MG/DL (ref 70–99)
HDLC SERPL-MCNC: 54 MG/DL
LDLC SERPL CALC-MCNC: 129 MG/DL
MAGNESIUM SERPL-MCNC: 2.1 MG/DL (ref 1.6–2.3)
NONHDLC SERPL-MCNC: 166 MG/DL
POTASSIUM SERPL-SCNC: 4.2 MMOL/L (ref 3.4–5.3)
PROT SERPL-MCNC: 8 G/DL (ref 6.8–8.8)
SODIUM SERPL-SCNC: 136 MMOL/L (ref 133–144)
TRIGL SERPL-MCNC: 184 MG/DL

## 2018-09-05 PROCEDURE — 83735 ASSAY OF MAGNESIUM: CPT | Performed by: PHYSICIAN ASSISTANT

## 2018-09-05 PROCEDURE — 90750 HZV VACC RECOMBINANT IM: CPT

## 2018-09-05 PROCEDURE — 36415 COLL VENOUS BLD VENIPUNCTURE: CPT | Performed by: PHYSICIAN ASSISTANT

## 2018-09-05 PROCEDURE — 99207 ZZC NO CHARGE NURSE ONLY: CPT

## 2018-09-05 PROCEDURE — 80061 LIPID PANEL: CPT | Performed by: PHYSICIAN ASSISTANT

## 2018-09-05 PROCEDURE — 80053 COMPREHEN METABOLIC PANEL: CPT | Performed by: PHYSICIAN ASSISTANT

## 2018-09-05 PROCEDURE — 90471 IMMUNIZATION ADMIN: CPT

## 2018-09-05 NOTE — NURSING NOTE
Prior to injection verified patient identity using patient's name and date of birth.  Due to injection administration, patient instructed to remain in clinic for 15 minutes  afterwards, and to report any adverse reaction to me immediately.    Screening Questionnaire for Adult Immunization    Are you sick today?   No   Do you have allergies to medications, food, a vaccine component or latex?   No   Have you ever had a serious reaction after receiving a vaccination?   No   Do you have a long-term health problem with heart disease, lung disease, asthma, kidney disease, metabolic disease (e.g. diabetes), anemia, or other blood disorder?   No   Do you have cancer, leukemia, HIV/AIDS, or any other immune system problem?   No   In the past 3 months, have you taken medications that affect  your immune system, such as prednisone, other steroids, or anticancer drugs; drugs for the treatment of rheumatoid arthritis, Crohn s disease, or psoriasis; or have you had radiation treatments?   No   Have you had a seizure, or a brain or other nervous system problem?   No   During the past year, have you received a transfusion of blood or blood     products, or been given immune (gamma) globulin or antiviral drug?   No   For women: Are you pregnant or is there a chance you could become        pregnant during the next month?   No   Have you received any vaccinations in the past 4 weeks?   No     Immunization questionnaire answers were all negative.         Screening performed by Anum Jennings on 9/5/2018 at 9:08 AM.

## 2018-09-05 NOTE — LETTER
[facility]  [address] [address]  Phone: [phone]    09/07/18    Farhat Layne [facility] [address]      Farhat        We are writing to inform you of the results from your recent lab work, included is a copy of those results.    Component      Latest Ref Rng & Units 9/5/2018   Sodium      133 - 144 mmol/L 136   Potassium      3.4 - 5.3 mmol/L 4.2   Chloride      94 - 109 mmol/L 102   Carbon Dioxide      20 - 32 mmol/L 30   Anion Gap      3 - 14 mmol/L 4   Glucose      70 - 99 mg/dL 115 (H)   Urea Nitrogen      7 - 30 mg/dL 15   Creatinine      0.66 - 1.25 mg/dL 1.07   GFR Estimate      >60 mL/min/1.7m2 71   GFR Estimate If Black      >60 mL/min/1.7m2 86   Calcium      8.5 - 10.1 mg/dL 9.5   Bilirubin Total      0.2 - 1.3 mg/dL 1.8 (H)   Albumin      3.4 - 5.0 g/dL 4.3   Protein Total      6.8 - 8.8 g/dL 8.0   Alkaline Phosphatase      40 - 150 U/L 57   ALT      0 - 70 U/L 100 (H)   AST      0 - 45 U/L 51 (H)   Cholesterol      <200 mg/dL 220 (H)   Triglycerides      <150 mg/dL 184 (H)   HDL Cholesterol      >39 mg/dL 54   LDL Cholesterol Calculated      <100 mg/dL 129 (H)   Non HDL Cholesterol      <130 mg/dL 166 (H)   Magnesium      1.6 - 2.3 mg/dL 2.1     Your liver function tests are slightly abnormal.  This can be due to alcohol or other medications/supplements in your diet.  Additionally, some infections can cause this. I suggest     reducing intake of alcohol and/or supplements (especially any pain medications such as ibuprofen) for a period of time and rechecking levels in a month.     Your glucose was mildly elevated in the pre-diabetic range (not diabetes).  A healthy diet, regular exercise and maintenance of a healthy weight is the best way to prevent diabetes.  I recommend we continue to monitor this, recheck a fasting blood sugar and an A1C in 1 month.     Your cholesterol levels are also elevated, weight loss and healthy  diet/exercise can help with this.       Your electrolytes and magnesium levels were normal.           Please follow-up after your next set of labs to review.     If you have any questions, please do not hesitate to call our office.        Sincerely,    Helena Magaña PA-C

## 2018-09-05 NOTE — MR AVS SNAPSHOT
After Visit Summary   9/5/2018    Farhat Tejada    MRN: [id number]           Patient Information     Date Of Birth          1960        Visit Information        Provider Department      9/5/2018 9:15 AM Desi/Lpn, Fl [facility]        Today's Diagnoses     Need for vaccination    -  1       Follow-ups after your visit        Who to contact     Normal or non-critical lab and imaging results will be communicated to you by MyChart, letter or phone within 4 business days after the clinic has received the results. If you do not hear from us within 7 days, please contact the clinic through MyChart or phone. If you have a critical or abnormal lab result, we will notify you by phone as soon as possible.  Submit refill requests through Moonf[employer]e or call your pharmacy and they will forward the refill request to us. Please allow 3 business days for your refill to be completed.          If you need to speak with a  for additional information , please call: [phone]           Additional Information About Your Visit        Care EveryWhere ID     This is your Care EveryWhere ID. This could be used by other organizations to access your [place] medical records  [id number]         Blood Pressure from Last 3 Encounters:   08/27/18 124/88   06/20/17 127/85    Weight from Last 3 Encounters:   08/27/18 218 lb 6.4 oz (99.1 kg)   06/20/17 212 lb 6.4 oz (96.3 kg)              We Performed the Following     1st  Administration  [[id number]]     SHINGRIX [[id number]]        Primary Care Provider Office Phone # Fax #    Helena Amber Magaña PA-C [phone] [address]   [place] [facility] [address]        Equal Access to Services     [facility]EN AH: Hadii aad ku hadashjey Sonatalie, waaxda luqadaha, qaybta kaalmada phillip, mary whyte. So [facility] [phone].    ATENCIÓN: Si habla español, tiene a amador disposición servicios gratuitos de asistencia  lingüísticaSarah Huff al [phone].    We comply with applicable federal civil rights laws and [place] laws. We do not discriminate on the basis of race, color, national origin, age, disability, sex, sexual orientation, or gender identity.            Thank you!     Thank you for choosing [facility]  for your care. Our goal is always to provide you with excellent care. Hearing back from our patients is one way we can continue to improve our services. Please take a few minutes to complete the written survey that you may receive in the mail after your visit with us. Thank you!             Your Updated Medication List - Protect others around you: Learn how to safely use, store and throw away your medicines at www.disposemymeds.org.      Notice  As of 9/5/2018  9:18 AM    You have not been prescribed any medications.

## 2018-09-07 PROBLEM — E78.5 HYPERLIPIDEMIA LDL GOAL <160: Status: ACTIVE | Noted: 2018-09-07

## 2018-09-07 PROBLEM — R73.03 PREDIABETES: Status: ACTIVE | Noted: 2018-09-07

## 2018-10-12 PROBLEM — E66.811 OBESITY (BMI 30.0-34.9): Chronic | Status: ACTIVE | Noted: 2018-08-27

## 2018-10-15 ENCOUNTER — OFFICE VISIT (OUTPATIENT)
Dept: SLEEP MEDICINE | Facility: CLINIC | Age: 58
End: 2018-10-15
Attending: PHYSICIAN ASSISTANT
Payer: COMMERCIAL

## 2018-10-15 VITALS
OXYGEN SATURATION: 95 % | HEART RATE: 76 BPM | SYSTOLIC BLOOD PRESSURE: 128 MMHG | WEIGHT: 219 LBS | HEIGHT: 70 IN | DIASTOLIC BLOOD PRESSURE: 84 MMHG | BODY MASS INDEX: 31.35 KG/M2

## 2018-10-15 DIAGNOSIS — R53.81 MALAISE AND FATIGUE: ICD-10-CM

## 2018-10-15 DIAGNOSIS — R06.00 DYSPNEA AND RESPIRATORY ABNORMALITY: Primary | ICD-10-CM

## 2018-10-15 DIAGNOSIS — E66.09 CLASS 1 OBESITY DUE TO EXCESS CALORIES WITH BODY MASS INDEX (BMI) OF 31.0 TO 31.9 IN ADULT, UNSPECIFIED WHETHER SERIOUS COMORBIDITY PRESENT: ICD-10-CM

## 2018-10-15 DIAGNOSIS — Z72.820 LACK OF ADEQUATE SLEEP: ICD-10-CM

## 2018-10-15 DIAGNOSIS — R53.83 MALAISE AND FATIGUE: ICD-10-CM

## 2018-10-15 DIAGNOSIS — R06.89 DYSPNEA AND RESPIRATORY ABNORMALITY: Primary | ICD-10-CM

## 2018-10-15 DIAGNOSIS — E66.811 CLASS 1 OBESITY DUE TO EXCESS CALORIES WITH BODY MASS INDEX (BMI) OF 31.0 TO 31.9 IN ADULT, UNSPECIFIED WHETHER SERIOUS COMORBIDITY PRESENT: ICD-10-CM

## 2018-10-15 DIAGNOSIS — R06.83 SNORING: ICD-10-CM

## 2018-10-15 PROCEDURE — 99244 OFF/OP CNSLTJ NEW/EST MOD 40: CPT | Performed by: PHYSICIAN ASSISTANT

## 2018-10-15 NOTE — PROGRESS NOTES
Sleep Consultation:    Date on this visit: 10/15/2018    Farhat Tejada is sent by Helena Magaña for a sleep consultation regarding snoring.    Primary Physician: Helena Magaña     Chief Complaint   Patient presents with     Sleep Problem     consult- snoring     He reports that he underwent an overnight oximetry in 2016 and was abnormal. He was advised to undergo a formal PSG, but did not do so.     Farhat goes to sleep at 9:30 PM during the week. He wakes up at 9:30 AM without an alarm. He falls asleep in 30 minutes.  Farhat denies difficulty falling asleep.  He wakes up 0-2 times a night for 60-90 minutes before falling back to sleep.  Farhat wakes up to uncertain reasons.  On weekends, Farhat goes to sleep at 11:00 PM.  He wakes up at 7:00 AM without an alarm. He falls asleep in 30 minutes.  Patient gets an average of 6 hours of sleep per night.     Patient does watch TV in bed.     Farhat does not do shift work.      Farhat does snore every night and snoring is very loud. Patient does have a regular bed partner. There is report of snoring.  He does not have witnessed apneas. They occasionally sleep separately.  Patient sleeps on his back and side. He has frequent morning dry mouth, denies no morning headaches, morning confusion. He has mild symptoms of RLS. Farhat denies any bruxism, sleep walking, sleep talking, dream enactment, sleep paralysis, cataplexy and hypnogogic/hypnopompic hallucinations.    Farhat denies difficulty breathing through his nose, claustrophobia and reflux at night.      Farhat has gained 20 pounds in 10 years.  Patient describes themself as a morning person.  He would prefer to go to sleep at 11:00 PM and wake up at 5:00 AM.  Patient's Howell Sleepiness score 8/24 inconsistent with excessive  daytime sleepiness.  He has fatigue.     Farhat does not take naps. He takes some inadvertant naps.  He denies falling asleep while driving.  Patient was counseled on the importance of driving while alert,  to pull over if drowsy, or nap before getting into the vehicle if sleepy.  He uses 1 cups/day of coffee. Last caffeine intake is usually before noon.    Allergies:    No Known Allergies    Medications:    No current outpatient prescriptions on file.       Problem List:  Patient Active Problem List    Diagnosis Date Noted     Prediabetes 2018     Priority: Medium     Elevated liver enzymes 2018     Priority: Medium     Hyperlipidemia LDL goal <160 2018     Priority: Medium     Obesity (BMI 30.0-34.9) 2018     Priority: Medium     CARDIOVASCULAR SCREENING; LDL GOAL LESS THAN 160 2018     Priority: Medium     Advanced directives, counseling/discussion 2017     Priority: Medium     Advance Care Planning 2017: ACP Review of Chart / Resources Provided:  Reviewed chart for advance care plan.  Farhat Tejada has an advance care plan which needs to be updated. Patient states presence of new/updated ACP document. Copy requested  Added by Leticia Almaraz            Snoring 2014     Priority: Medium     Thalassemia 2014     Priority: Medium        Past Medical/Surgical History:  No past medical history on file.  Past Surgical History:   Procedure Laterality Date     VASECTOMY       [facility]         Social History:  Social History     Social History     Marital status:      Spouse name: N/A     Number of children: N/A     Years of education: N/A     Occupational History     Not on file.     Social History Main Topics     Smoking status: Never Smoker     Smokeless tobacco: Never Used     Alcohol use Yes     Drug use: No     Sexual activity: Yes     Partners: Female     Other Topics Concern     Parent/Sibling W/ Cabg, Mi Or Angioplasty Before 65f 55m? Yes     mother  at age 36 from heart condition      Social History Narrative       Family History:  Family History   Problem Relation Age of Onset     Hypertension Mother      Heart Failure Mother 36      "Diabetes Father      Hypertension Father      Cerebrovascular Disease Father 88     Thyroid Cancer Brother      Diabetes Paternal Aunt        Review of Systems:  CONSTITUTIONAL: POSITIVE for weight gain. NEGATIVE for weight gain/loss, fever, chills, sweats or night sweats.  EYES: NEGATIVE for changes in vision, blind spots, double vision.  ENT: NEGATIVE for ear pain, sore throat, sinus pain, post-nasal drip, runny nose, bloody nose  CARDIAC: NEGATIVE for fast heartbeats or fluttering in chest, chest pain or pressure, breathlessness when lying flat, swollen legs or swollen feet.  NEUROLOGIC: NEGATIVE headaches, weakness or numbness in the arms or legs.  DERMATOLOGIC: NEGATIVE for rashes, new moles or change in mole(s)  PULMONARY: POSITIVE for SOB with activity. NEGATIVE SOB at rest, dry cough, productive cough, coughing up blood, wheezing or whistling when breathing.    GASTROINTESTINAL: NEGATIVE for nausea or vomitting, loose or watery stools, fat or grease in stools, constipation, abdominal pain, bowel movements black in color or blood noted.  GENITOURINARY: NEGATIVE for pain during urination, blood in urine, urinating more frequently than usual, irregular menstrual periods.  MUSCULOSKELETAL: NEGATIVE for muscle pain, bone or joint pain, swollen joints.  ENDOCRINE: NEGATIVE for increased thirst or urination, diabetes.  LYMPHATIC: NEGATIVE for swollen lymph nodes, lumps or bumps in the breasts or nipple discharge.    Physical Examination:  Vitals: /84  Pulse 76  Ht 1.778 m (5' 10\")  Wt 99.3 kg (219 lb)  SpO2 95%  BMI 31.42 kg/m2  BMI= Body mass index is 31.42 kg/(m^2).    Neck Cir (cm): 42 cm    Flint Total Score 10/15/2018   Total score - Flint 8     GENERAL APPEARANCE: alert and no distress  EYES: Eyes grossly normal to inspection, PERRL and conjunctivae and sclerae normal  HENT: ear canals and TM's normal, nose and mouth without ulcers or lesions and tongue base enlarged  NECK: no adenopathy and no " asymmetry, masses, or scars  RESP: lungs clear to auscultation - no rales, rhonchi or wheezes  MS: extremities normal- no gross deformities noted  NEURO: Normal strength and tone, mentation intact and speech normal  PSYCH: mentation appears normal and affect normal/bright  Mallampati Class: III.  Tonsillar Stage:     Last Comprehensive Metabolic Panel:  Sodium   Date Value Ref Range Status   09/05/2018 136 133 - 144 mmol/L Final     Potassium   Date Value Ref Range Status   09/05/2018 4.2 3.4 - 5.3 mmol/L Final     Chloride   Date Value Ref Range Status   09/05/2018 102 94 - 109 mmol/L Final     Carbon Dioxide   Date Value Ref Range Status   09/05/2018 30 20 - 32 mmol/L Final     Anion Gap   Date Value Ref Range Status   09/05/2018 4 3 - 14 mmol/L Final     Glucose   Date Value Ref Range Status   09/05/2018 115 (H) 70 - 99 mg/dL Final     Comment:     Fasting specimen     Urea Nitrogen   Date Value Ref Range Status   09/05/2018 15 7 - 30 mg/dL Final     Creatinine   Date Value Ref Range Status   09/05/2018 1.07 0.66 - 1.25 mg/dL Final     GFR Estimate   Date Value Ref Range Status   09/05/2018 71 >60 mL/min/1.7m2 Final     Comment:     Non  GFR Calc     Calcium   Date Value Ref Range Status   09/05/2018 9.5 8.5 - 10.1 mg/dL Final     No results found for: TSH  Impression:  Patient has features and risk factors for possible obstructive sleep apnea including: loud snoring, non-refreshing sleep, bruxism, daytime fatigue, difficulty maintaining sleep, crowded oropharynx and co-morbid HTN. The STOP-BANG score is 4/8.     Plan:    1. Schedule a Home Sleep Apnea Testing to evaluate for obstructive sleep apnea.    2. Advised him against drowsy driving.    3. Recommend weight management.     Literature provided regarding sleep apnea.      He will follow up with me in approximately one day after his sleep study has been completed to review the results and discuss plan of care.       Home Sleep Apnea  Testingreviewed.  Obstructive sleep apnea reviewed.  Complications of untreated sleep apnea were reviewed.    Nery Nicole PA-C    CC: Helena Magaña

## 2018-10-15 NOTE — NURSING NOTE
"Chief Complaint   Patient presents with     Sleep Problem     consult- snoring       Initial /84  Pulse 76  Ht 1.778 m (5' 10\")  Wt 99.3 kg (219 lb)  SpO2 95%  BMI 31.42 kg/m2 Estimated body mass index is 31.42 kg/(m^2) as calculated from the following:    Height as of this encounter: 1.778 m (5' 10\").    Weight as of this encounter: 99.3 kg (219 lb).    Medication Reconciliation: complete    Neck circumference: 16.5 inches / 42 centimeters.        "

## 2018-10-15 NOTE — MR AVS SNAPSHOT
After Visit Summary   10/15/2018    Farhat Tejada    MRN: [id number]           Patient Information     Date Of Birth          1960        Visit Information        Provider Department      10/15/2018 2:40 PM Nery Nicole PA [facility]        Today's Diagnoses     Dyspnea and respiratory abnormality    -  1    Snoring        Class 1 obesity due to excess calories with body mass index (BMI) of 31.0 to 31.9 in adult, unspecified whether serious comorbidity present        Lack of adequate sleep        Malaise and fatigue          Care Instructions      Your BMI is Body mass index is 31.42 kg/(m^2).  Weight management is a personal decision.  If you are interested in exploring weight loss strategies, the following discussion covers the approaches that may be successful. Body mass index (BMI) is one way to tell whether you are at a healthy weight, overweight, or obese. It measures your weight in relation to your height.  A BMI of 18.5 to 24.9 is in the healthy range. A person with a BMI of 25 to 29.9 is considered overweight, and someone with a BMI of 30 or greater is considered obese. More than two-thirds of American adults are considered overweight or obese.  Being overweight or obese increases the risk for further weight gain. Excess weight may lead to heart disease and diabetes.  Creating and following plans for healthy eating and physical activity may help you improve your health.  Weight control is part of healthy lifestyle and includes exercise, emotional health, and healthy eating habits. Careful eating habits lifelong are the mainstay of weight control. Though there are significant health benefits from weight loss, long-term weight loss with diet alone may be very difficult to achieve- studies show long-term success with dietary management in less than 10% of people. Attaining a healthy weight may be especially difficult to achieve in those with severe obesity. In some cases,  medications, devices and surgical management might be considered.  What can you do?  If you are overweight or obese and are interested in methods for weight loss, you should discuss this with your provider.     Consider reducing daily calorie intake by 500 calories.     Keep a food journal.     Avoiding skipping meals, consider cutting portions instead.    Diet combined with exercise helps maintain muscle while optimizing fat loss. Strength training is particularly important for building and maintaining muscle mass. Exercise helps reduce stress, increase energy, and improves fitness. Increasing exercise without diet control, however, may not burn enough calories to loose weight.       Start walking three days a week 10-20 minutes at a time    Work towards walking thirty minutes five days a week     Eventually, increase the speed of your walking for 1-2 minutes at time    In addition, we recommend that you review healthy lifestyles and methods for weight loss available through the National Institutes of Health patient information sites:  http://win.niddk.nih.gov/publications/index.htm    And look into health and wellness programs that may be available through your health insurance provider, employer, local community center, or dar club.    Weight management plan: Patient was referred to their PCP to discuss a diet and exercise plan.            Follow-ups after your visit        Your next 10 appointments already scheduled     Oct 29, 2018 11:30 AM CDT   HST  with BK BED 5   [facility] ([facility]    [address] [place] [address]   [phone]            Oct 30, 2018 11:30 AM CDT   HST Drop Off with BK BED 5   [facility] ([facility])    [address] [place] [address]   [phone]            Nov 05, 2018  1:00 PM CST   Return Sleep Patient with Nery Nicole,  "PA   [facility] ([facility])    [address] [address] [id number] [phone]              Future tests that were ordered for you today     Open Future Orders        Priority Expected Expires Ordered    HST-Home Sleep Apnea Test Routine  4/16/2019 10/15/2018            Who to contact     If you have questions or need follow up information about today's clinic visit or your schedule please contact [facility] directly at [phone].  Normal or non-critical lab and imaging results will be communicated to you by MyChart, letter or phone within 4 business days after the clinic has received the results. If you do not hear from us within 7 days, please contact the clinic through MyChart or phone. If you have a critical or abnormal lab result, we will notify you by phone as soon as possible.  Submit refill requests through [employer] or call your pharmacy and they will forward the refill request to us. Please allow 3 business days for your refill to be completed.          Additional Information About Your Visit        Care EveryWhere ID     This is your Care EveryWhere ID. This could be used by other organizations to access your [facility] medical records  [id number]        Your Vitals Were     Pulse Height Pulse Oximetry BMI (Body Mass Index)          76 1.778 m (5' 10\") 95% 31.42 kg/m2         Blood Pressure from Last 3 Encounters:   10/15/18 128/84   08/27/18 124/88   06/20/17 127/85    Weight from Last 3 Encounters:   10/15/18 99.3 kg (219 lb)   08/27/18 99.1 kg (218 lb 6.4 oz)   06/20/17 96.3 kg (212 lb 6.4 oz)              We Performed the Following     SLEEP EVALUATION & MANAGEMENT REFERRAL - ADULT -[facility]  [phone] (Age 15 and up)        Primary Care Provider Office Phone # Fax #    Helena Magaña PA-C [phone] [address] [place] [address]        Equal " Access to Services     [facility] : Hadii pedro Uribe, wamercedes ceja, yfnmary michael. So [facility] [phone].    ATENCIÓN: Si habla español, tiene a amador disposición servicios gratuitos de asistencia lingüística. Llame al [phone].    We comply with applicable federal civil rights laws and [place] laws. We do not discriminate on the basis of race, color, national origin, age, disability, sex, sexual orientation, or gender identity.            Thank you!     Thank you for choosing [facility]  for your care. Our goal is always to provide you with excellent care. Hearing back from our patients is one way we can continue to improve our services. Please take a few minutes to complete the written survey that you may receive in the mail after your visit with us. Thank you!             Your Updated Medication List - Protect others around you: Learn how to safely use, store and throw away your medicines at www.disposemymeds.org.      Notice  As of 10/15/2018  3:21 PM    You have not been prescribed any medications.

## 2018-10-15 NOTE — PATIENT INSTRUCTIONS

## 2018-10-29 ENCOUNTER — OFFICE VISIT (OUTPATIENT)
Dept: SLEEP MEDICINE | Facility: CLINIC | Age: 58
End: 2018-10-29
Payer: COMMERCIAL

## 2018-10-29 DIAGNOSIS — G47.33 OSA (OBSTRUCTIVE SLEEP APNEA): ICD-10-CM

## 2018-10-29 DIAGNOSIS — R53.81 MALAISE AND FATIGUE: ICD-10-CM

## 2018-10-29 DIAGNOSIS — R06.89 DYSPNEA AND RESPIRATORY ABNORMALITY: ICD-10-CM

## 2018-10-29 DIAGNOSIS — E66.09 CLASS 1 OBESITY DUE TO EXCESS CALORIES WITH BODY MASS INDEX (BMI) OF 31.0 TO 31.9 IN ADULT, UNSPECIFIED WHETHER SERIOUS COMORBIDITY PRESENT: ICD-10-CM

## 2018-10-29 DIAGNOSIS — Z72.820 LACK OF ADEQUATE SLEEP: ICD-10-CM

## 2018-10-29 DIAGNOSIS — R06.83 SNORING: ICD-10-CM

## 2018-10-29 DIAGNOSIS — E66.811 CLASS 1 OBESITY DUE TO EXCESS CALORIES WITH BODY MASS INDEX (BMI) OF 31.0 TO 31.9 IN ADULT, UNSPECIFIED WHETHER SERIOUS COMORBIDITY PRESENT: ICD-10-CM

## 2018-10-29 DIAGNOSIS — R06.00 DYSPNEA AND RESPIRATORY ABNORMALITY: ICD-10-CM

## 2018-10-29 DIAGNOSIS — R53.83 MALAISE AND FATIGUE: ICD-10-CM

## 2018-10-29 PROCEDURE — G0399 HOME SLEEP TEST/TYPE 3 PORTA: HCPCS | Performed by: INTERNAL MEDICINE

## 2018-10-29 NOTE — MR AVS SNAPSHOT
After Visit Summary   10/29/2018    Farhat Tejada    MRN: [id number]           Patient Information     Date Of Birth          1960        Visit Information        Provider Department      10/29/2018 11:30 AM BK BED 5 [facility]        Today's Diagnoses     Snoring        Class 1 obesity due to excess calories with body mass index (BMI) of 31.0 to 31.9 in adult, unspecified whether serious comorbidity present        Lack of adequate sleep        Dyspnea and respiratory abnormality        Malaise and fatigue           Follow-ups after your visit        Your next 10 appointments already scheduled     Oct 30, 2018 11:30 AM CDT   HST Drop Off with BK BED 5   [facility] ([facility])    [address]  [place] [id number] [phone]            Nov 05, 2018  1:00 PM CST   Return Sleep Patient with JUANJO Roca   [facility] ([facility])    [address]  [place] [id number] [phone]              Who to contact     If you have questions or need follow up information about today's clinic visit or your schedule please contact [facility] directly at [phone].  Normal or non-critical lab and imaging results will be communicated to you by MyChart, letter or phone within 4 business days after the clinic has received the results. If you do not hear from us within 7 days, please contact the clinic through MyChart or phone. If you have a critical or abnormal lab result, we will notify you by phone as soon as possible.  Submit refill requests through qianchengwuyou or call your pharmacy and they will forward the refill request to us. Please allow 3 business days for your refill to be completed.          Additional Information About Your Visit        Care EveryWhere ID     This is your Care EveryWhere ID. This could be used by other  organizations to access your [place] medical records  [id number]         Blood Pressure from Last 3 Encounters:   10/15/18 128/84   08/27/18 124/88   06/20/17 127/85    Weight from Last 3 Encounters:   10/15/18 99.3 kg (219 lb)   08/27/18 99.1 kg (218 lb 6.4 oz)   06/20/17 96.3 kg (212 lb 6.4 oz)              We Performed the Following     HST-Home Sleep Apnea Test        Primary Care Provider Office Phone # Fax #    Helena Magaña PA-C [phone] [address] [place] [address]        Equal Access to Services     JENNIFER STRICKLAND : Hadii pedro Uribe, waaxda luqadaha, qaybta kaalmada adebrandiyada, mary whyte. So [facility] [phone].    ATENCIÓN: Si habla español, tiene a amador disposición servicios gratuitos de asistencia lingüística. Llame al [phone].    We comply with applicable federal civil rights laws and [place] laws. We do not discriminate on the basis of race, color, national origin, age, disability, sex, sexual orientation, or gender identity.            Thank you!     Thank you for choosing [facility]  for your care. Our goal is always to provide you with excellent care. Hearing back from our patients is one way we can continue to improve our services. Please take a few minutes to complete the written survey that you may receive in the mail after your visit with us. Thank you!             Your Updated Medication List - Protect others around you: Learn how to safely use, store and throw away your medicines at www.disposemymeds.org.      Notice  As of 10/29/2018 12:58 PM    You have not been prescribed any medications.

## 2018-10-30 ENCOUNTER — DOCUMENTATION ONLY (OUTPATIENT)
Dept: SLEEP MEDICINE | Facility: CLINIC | Age: 58
End: 2018-10-30
Payer: COMMERCIAL

## 2018-10-30 NOTE — PROGRESS NOTES
This HSAT was performed using a Noxturnal T3 device which recorded snore, sound, movement activity, body position, nasal pressure, oronasal thermal airflow, pulse, oximetry and both chest and abdominal respiratory effort. HSAT data was restricted to the time patient states they were in bed.     HSAT was scored using 1B 4% hypopnea rule.     HST AHI (Non-PAT): 17.9  Snoring was reported as loud.  Time with SpO2 below 89% was 15.6 minutes.   Overall signal quality was good     Pt will follow up with sleep provider to determine appropriate therapy.

## 2018-11-05 ENCOUNTER — OFFICE VISIT (OUTPATIENT)
Dept: SLEEP MEDICINE | Facility: CLINIC | Age: 58
End: 2018-11-05
Payer: COMMERCIAL

## 2018-11-05 VITALS
HEIGHT: 70 IN | WEIGHT: 215 LBS | OXYGEN SATURATION: 99 % | BODY MASS INDEX: 30.78 KG/M2 | HEART RATE: 83 BPM | DIASTOLIC BLOOD PRESSURE: 81 MMHG | SYSTOLIC BLOOD PRESSURE: 119 MMHG

## 2018-11-05 DIAGNOSIS — G47.33 OSA (OBSTRUCTIVE SLEEP APNEA): Primary | ICD-10-CM

## 2018-11-05 PROCEDURE — 99213 OFFICE O/P EST LOW 20 MIN: CPT | Performed by: PHYSICIAN ASSISTANT

## 2018-11-05 NOTE — NURSING NOTE
"Chief Complaint   Patient presents with     Study Results       Initial There were no vitals taken for this visit. Estimated body mass index is 31.42 kg/(m^2) as calculated from the following:    Height as of 10/15/18: 1.778 m (5' 10\").    Weight as of 10/15/18: 99.3 kg (219 lb).    Medication Reconciliation: complete      "

## 2018-11-05 NOTE — MR AVS SNAPSHOT
After Visit Summary   11/5/2018    Farhat Tejada    MRN: [id number]           Patient Information     Date Of Birth          1960        Visit Information        Provider Department      11/5/2018 1:00 PM Nery Nicole PA [facility]        Today's Diagnoses     ALEXI (obstructive sleep apnea)    -  1      Care Instructions      Your BMI is Body mass index is 30.85 kg/(m^2).  Weight management is a personal decision.  If you are interested in exploring weight loss strategies, the following discussion covers the approaches that may be successful. Body mass index (BMI) is one way to tell whether you are at a healthy weight, overweight, or obese. It measures your weight in relation to your height.  A BMI of 18.5 to 24.9 is in the healthy range. A person with a BMI of 25 to 29.9 is considered overweight, and someone with a BMI of 30 or greater is considered obese. More than two-thirds of American adults are considered overweight or obese.  Being overweight or obese increases the risk for further weight gain. Excess weight may lead to heart disease and diabetes.  Creating and following plans for healthy eating and physical activity may help you improve your health.  Weight control is part of healthy lifestyle and includes exercise, emotional health, and healthy eating habits. Careful eating habits lifelong are the mainstay of weight control. Though there are significant health benefits from weight loss, long-term weight loss with diet alone may be very difficult to achieve- studies show long-term success with dietary management in less than 10% of people. Attaining a healthy weight may be especially difficult to achieve in those with severe obesity. In some cases, medications, devices and surgical management might be considered.  What can you do?  If you are overweight or obese and are interested in methods for weight loss, you should discuss this with your provider.     Consider reducing  daily calorie intake by 500 calories.     Keep a food journal.     Avoiding skipping meals, consider cutting portions instead.    Diet combined with exercise helps maintain muscle while optimizing fat loss. Strength training is particularly important for building and maintaining muscle mass. Exercise helps reduce stress, increase energy, and improves fitness. Increasing exercise without diet control, however, may not burn enough calories to loose weight.       Start walking three days a week 10-20 minutes at a time    Work towards walking thirty minutes five days a week     Eventually, increase the speed of your walking for 1-2 minutes at time    In addition, we recommend that you review healthy lifestyles and methods for weight loss available through the National Institutes of Health patient information sites:  http://win.niddk.nih.gov/publications/index.htm    And look into health and wellness programs that may be available through your health insurance provider, employer, local community center, or dar club.    Weight management plan: Patient was referred to their PCP to discuss a diet and exercise plan.              Follow-ups after your visit        Who to contact     If you have questions or need follow up information about today's clinic visit or your schedule please contact [facility] directly at [phone].  Normal or non-critical lab and imaging results will be communicated to you by MyChart, letter or phone within 4 business days after the clinic has received the results. If you do not hear from us within 7 days, please contact the clinic through [employer] or phone. If you have a critical or abnormal lab result, we will notify you by phone as soon as possible.  Submit refill requests through [employer] or call your pharmacy and they will forward the refill request to us. Please allow 3 business days for your refill to be completed.          Additional Information About Your Visit       "  Care EveryWhere ID     This is your Care EveryWhere ID. This could be used by other organizations to access your [place] medical records  [id number]        Your Vitals Were     Pulse Height Pulse Oximetry BMI (Body Mass Index)          83 1.778 m (5' 10\") 99% 30.85 kg/m2         Blood Pressure from Last 3 Encounters:   11/05/18 119/81   10/15/18 128/84   08/27/18 124/88    Weight from Last 3 Encounters:   11/05/18 97.5 kg (215 lb)   10/15/18 99.3 kg (219 lb)   08/27/18 99.1 kg (218 lb 6.4 oz)              We Performed the Following     Sleep Comprehensive DME        Primary Care Provider Office Phone # Fax #    Helena Magaña PA-C [phone] [address] [place] [address]        Equal Access to Services     [facility]: Hadii aad ku hadasho Sonatalie, waaxda luqadaha, qaybta kaalmada adeegyada, mary duff . So [facility] [phone].    ATENCIÓN: Si habla español, tiene a amador disposición servicios gratuitos de asistencia lingüística. Gretchename al [phone].    We comply with applicable federal civil rights laws and [place] laws. We do not discriminate on the basis of race, color, national origin, age, disability, sex, sexual orientation, or gender identity.            Thank you!     Thank you for choosing [facility]  for your care. Our goal is always to provide you with excellent care. Hearing back from our patients is one way we can continue to improve our services. Please take a few minutes to complete the written survey that you may receive in the mail after your visit with us. Thank you!             Your Updated Medication List - Protect others around you: Learn how to safely use, store and throw away your medicines at www.disposemymeds.org.      Notice  As of 11/5/2018  1:27 PM    You have not been prescribed any medications.      "

## 2018-11-05 NOTE — PROGRESS NOTES
"Home Sleep Apnea Testing Results Visit:    Chief Complaint   Patient presents with     Study Results       Farhat Tejada is a 58 year old male who returns to [facility] after having had Home Sleep Apnea Testing.  He presented with loud snoring, non-refreshing sleep, bruxism, daytime fatigue, difficulty maintaining sleep, crowded oropharynx and co-morbid HTN.    Estimated body mass index is 30.85 kg/(m^2) as calculated from the following:    Height as of this encounter: 1.778 m (5' 10\").    Weight as of this encounter: 97.5 kg (215 lb).  Total score - [place]: 6 (2018  1:00 PM)   STOP-BAN/8  CHLOÉ Total Score: 15    Home Sleep Apnea Testing - 18: 215 lbs 0 oz: AHI 17.9/hr. Supine AHI 17.9/hr.   Oxygen Pavel of 80%.  Baseline 93.7%.  Sp02 =< 88% for 15.6 minutes  He slept on his back (58.3%), prone (0.0%), left (0.0) and right (41.2%) sides.   Analysis time: 399.4 minutes.     Signal quality of Oxymeter 99% Good  Nasal Cannula 100% Good  RIP belts 100% Good.     Farhat Tejada reports that he slept Poor.     Home Sleep Apnea Testing was reviewed in detail today with Farhat and a copy given to him for his records.    Past medical/surgical history, family history, social history, medications and allergies were reviewed.      Ht 1.778 m (5' 10\")  Wt 97.5 kg (215 lb)  BMI 30.85 kg/m2    Impression/Plan:  Moderate Obstructive Sleep Apnea.   Sleep associated hypoxemia was present.     Treatment options discussed today including  auto-CPAP at 5-15 cmH2O, oral appliance therapy, positional therapy or polysomnography with full night PAP titration.    Elected treatment with auto-CPAP at 5-15 cmH2O.  Overnight oximetry once the patient is established on CPAP therapy.     20 minutes spent with patient with >50% spent in counseling and coordination of care regarding ALEXI and sleep related hypoxemia.     Nery Nicole PA-C     CC:  Helena Magaña,        "

## 2018-11-05 NOTE — PATIENT INSTRUCTIONS

## 2018-11-08 NOTE — PROCEDURES
"HOME SLEEP STUDY INTERPRETATION    Patient: Farhat Tejada  MRN: [id number]  YOB: 1960  Study Date: 10/29/2018  Referring Provider: Helena Magaña;   Ordering Provider: JUANJO Drake     Indications for Home Study: Farhat Tejada is a 58 year old male with a history of obesity and prediabetes who presents with symptoms suggestive of obstructive sleep apnea.    Estimated body mass index is 30.85 kg/(m^2) as calculated from the following:    Height as of 11/5/18: 1.778 m (5' 10\").    Weight as of 11/5/18: 97.5 kg (215 lb).  Total score - [place]: 6 (11/5/2018  1:00 PM)  StopBang Total Score: 4 (11/5/2018  1:13 PM)    Data: A full night home sleep study was performed recording the standard physiologic parameters including body position, movement, sound, nasal pressure, thermal oral airflow, chest and abdominal movements with respiratory inductance plethysmography, and oxygen saturation by pulse oximetry. Pulse rate was estimated by oximetry recording. This study was considered adequate based on > 4 hours of quality oximetry and respiratory recording. As specified by the AASM Manual for the Scoring of Sleep and Associated events, version 2.3, Rule VIII.D 1B, 4% oxygen desaturation scoring for hypopneas is used as a standard of care on all home sleep apnea testing.    Analysis Time:  399.4 minutes    Respiration:   Sleep Associated Hypoxemia: sustained hypoxemia was present. Baseline oxygen saturation was 93.7%.  Time with saturation less than or equal to 88% was 15.6 minutes. The lowest oxygen saturation was 80%.   Snoring: Snoring was present.  Respiratory events: The home study revealed a presence of 48 obstructive apneas and 7 mixed and central apneas. There were 64 hypopneas resulting in a combined apnea/hypopnea index [AHI] of 17.9 events per hour.  AHI was 17.8 per hour supine, - per hour prone, - per hour on left side, and 18.2 per hour on right side.   Pattern: Excluding events noted above, " respiratory rate and pattern was Normal.    Position: Percent of time spent: supine - 58.3%, prone - 0%, on left - 0%, on right - 41.2%.    Heart Rate: By pulse oximetry normal rate was noted.     Assessment:   Moderate obstructive sleep apnea.  Sleep associated hypoxemia was present.    Recommendations:  Consider auto-CPAP at 5-15 cmH2O, oral appliance therapy, polysomnography with full night PAP titration or surgical options.  Suggest optimizing sleep hygiene and avoiding sleep deprivation.  Weight management.    Diagnosis Code(s): Obstructive Sleep Apnea G47.33, Hypoxemia G47.36    David Black MD, November 8, 2018   Diplomate, American Board of Internal Medicine, Sleep Medicine

## 2018-11-14 ENCOUNTER — TELEPHONE (OUTPATIENT)
Dept: SLEEP MEDICINE | Facility: CLINIC | Age: 58
End: 2018-11-14

## 2018-11-19 ENCOUNTER — TELEPHONE (OUTPATIENT)
Dept: SLEEP MEDICINE | Facility: CLINIC | Age: 58
End: 2018-11-19

## 2019-02-11 DIAGNOSIS — R74.8 ELEVATED LIVER ENZYMES: ICD-10-CM

## 2019-02-11 DIAGNOSIS — R73.03 PREDIABETES: ICD-10-CM

## 2019-02-11 LAB
ALBUMIN SERPL-MCNC: 4.3 G/DL (ref 3.4–5)
ALP SERPL-CCNC: 63 U/L (ref 40–150)
ALT SERPL W P-5'-P-CCNC: 68 U/L (ref 0–70)
AST SERPL W P-5'-P-CCNC: 36 U/L (ref 0–45)
BILIRUB DIRECT SERPL-MCNC: 0.2 MG/DL (ref 0–0.2)
BILIRUB SERPL-MCNC: 1.4 MG/DL (ref 0.2–1.3)
HBA1C MFR BLD: 6.1 % (ref 0–5.6)
PROT SERPL-MCNC: 7.9 G/DL (ref 6.8–8.8)

## 2019-02-11 PROCEDURE — 87340 HEPATITIS B SURFACE AG IA: CPT | Performed by: PHYSICIAN ASSISTANT

## 2019-02-11 PROCEDURE — 83036 HEMOGLOBIN GLYCOSYLATED A1C: CPT | Performed by: PHYSICIAN ASSISTANT

## 2019-02-11 PROCEDURE — 36415 COLL VENOUS BLD VENIPUNCTURE: CPT | Performed by: PHYSICIAN ASSISTANT

## 2019-02-11 PROCEDURE — 86803 HEPATITIS C AB TEST: CPT | Performed by: PHYSICIAN ASSISTANT

## 2019-02-11 PROCEDURE — 86708 HEPATITIS A ANTIBODY: CPT | Performed by: PHYSICIAN ASSISTANT

## 2019-02-11 PROCEDURE — 80076 HEPATIC FUNCTION PANEL: CPT | Performed by: PHYSICIAN ASSISTANT

## 2019-02-12 LAB
HAV IGG SER QL IA: REACTIVE
HBV SURFACE AG SERPL QL IA: NONREACTIVE
HCV AB SERPL QL IA: NONREACTIVE

## 2019-02-21 ENCOUNTER — OFFICE VISIT (OUTPATIENT)
Dept: FAMILY MEDICINE | Facility: CLINIC | Age: 59
End: 2019-02-21
Payer: COMMERCIAL

## 2019-02-21 VITALS
WEIGHT: 220.4 LBS | DIASTOLIC BLOOD PRESSURE: 78 MMHG | RESPIRATION RATE: 14 BRPM | SYSTOLIC BLOOD PRESSURE: 120 MMHG | BODY MASS INDEX: 31.55 KG/M2 | HEART RATE: 72 BPM | HEIGHT: 70 IN | TEMPERATURE: 97.7 F

## 2019-02-21 DIAGNOSIS — R94.5 ABNORMAL RESULTS OF LIVER FUNCTION STUDIES: Primary | ICD-10-CM

## 2019-02-21 DIAGNOSIS — R73.03 PREDIABETES: ICD-10-CM

## 2019-02-21 PROCEDURE — 36415 COLL VENOUS BLD VENIPUNCTURE: CPT | Performed by: PHYSICIAN ASSISTANT

## 2019-02-21 PROCEDURE — 99213 OFFICE O/P EST LOW 20 MIN: CPT | Performed by: PHYSICIAN ASSISTANT

## 2019-02-21 PROCEDURE — 86709 HEPATITIS A IGM ANTIBODY: CPT | Performed by: PHYSICIAN ASSISTANT

## 2019-02-21 ASSESSMENT — MIFFLIN-ST. JEOR: SCORE: 1820.98

## 2019-02-21 ASSESSMENT — PAIN SCALES - GENERAL: PAINLEVEL: NO PAIN (0)

## 2019-02-21 NOTE — NURSING NOTE
"Initial /78 (BP Location: Right arm, Patient Position: Chair, Cuff Size: Adult Large)   Pulse 72   Temp 97.7  F (36.5  C) (Tympanic)   Resp 14   Ht 1.778 m (5' 10\")   Wt 100 kg (220 lb 6.4 oz)   BMI 31.62 kg/m   Estimated body mass index is 31.62 kg/m  as calculated from the following:    Height as of this encounter: 1.778 m (5' 10\").    Weight as of this encounter: 100 kg (220 lb 6.4 oz). .    Marsha Spivey CMA (AAMA)    "

## 2019-02-21 NOTE — PROGRESS NOTES
SUBJECTIVE:   Farhat Tejada is a 58 year old male who presents to clinic today for the following health issues:    *Consult - Patient would like to discuss lab results from 2/11. Patient states that he had not been fasting for those labs, would like glucose checked today as he is now fasting.    Exercise:  Walking/treadmill (3-4 times per week).   Diet:  A couple times out to eat per week.  He has a family history of diabetes and is aware of portion control and exercise as a way of improving sugar levels.      He had abnormal liver tests about 6 months ago and follow up testing revealed a positive Hep A IgG.  He has had the hepatitis a vaccine in the past.   He denies any symptoms of hepatitis (no nausea, malaise, abdominal pain or anorexia).           Problem list and histories reviewed & adjusted, as indicated.  Additional history: as documented    Patient Active Problem List   Diagnosis     Advanced directives, counseling/discussion     Thalassemia     CARDIOVASCULAR SCREENING; LDL GOAL LESS THAN 160     Prediabetes     Elevated liver enzymes     Hyperlipidemia LDL goal <160     Class 1 obesity due to excess calories with body mass index (BMI) of 31.0 to 31.9 in adult, unspecified whether serious comorbidity present     ALEXI (obstructive sleep apnea)     Past Surgical History:   Procedure Laterality Date     VASECTOMY       [facility]         Social History     Tobacco Use     Smoking status: Never Smoker     Smokeless tobacco: Never Used   Substance Use Topics     Alcohol use: Yes     Family History   Problem Relation Age of Onset     Hypertension Mother      Heart Failure Mother 36     Diabetes Father      Hypertension Father      Cerebrovascular Disease Father 88     Thyroid Cancer Brother      Diabetes Paternal Aunt          No current outpatient medications on file.     BP Readings from Last 3 Encounters:   02/21/19 120/78   11/05/18 119/81   10/15/18 128/84    Wt Readings from Last 3 Encounters:  "  02/21/19 100 kg (220 lb 6.4 oz)   11/05/18 97.5 kg (215 lb)   10/15/18 99.3 kg (219 lb)                    Reviewed and updated as needed this visit by clinical staff       Reviewed and updated as needed this visit by Provider         ROS:  Constitutional, HEENT, cardiovascular, pulmonary, gi and gu systems are negative, except as otherwise noted.    OBJECTIVE:     /78 (BP Location: Right arm, Patient Position: Chair, Cuff Size: Adult Large)   Pulse 72   Temp 97.7  F (36.5  C) (Tympanic)   Resp 14   Ht 1.778 m (5' 10\")   Wt 100 kg (220 lb 6.4 oz)   BMI 31.62 kg/m    Body mass index is 31.62 kg/m .  GENERAL: healthy, alert and no distress    Diagnostic Test Results:  Results for orders placed or performed in visit on 02/11/19   **Hepatitis C Screen Reflex to RNA FUTURE anytime   Result Value Ref Range    Hepatitis C Antibody Nonreactive NR^Nonreactive   Hemoglobin A1c   Result Value Ref Range    Hemoglobin A1C 6.1 (H) 0 - 5.6 %   Hepatic panel   Result Value Ref Range    Bilirubin Direct 0.2 0.0 - 0.2 mg/dL    Bilirubin Total 1.4 (H) 0.2 - 1.3 mg/dL    Albumin 4.3 3.4 - 5.0 g/dL    Protein Total 7.9 6.8 - 8.8 g/dL    Alkaline Phosphatase 63 40 - 150 U/L    ALT 68 0 - 70 U/L    AST 36 0 - 45 U/L   Hepatitis B surface antigen   Result Value Ref Range    Hep B Surface Agn Nonreactive NR^Nonreactive   Hepatitis A Antibody IgG   Result Value Ref Range    Hepatitis A Antibody IgG Reactive (AA) NR^Nonreactive       ASSESSMENT/PLAN:       1. Abnormal results of liver function studies  Will check Hep A IgM to determine if elevated Hep A IgG is due to previous or active infection, or due to previous immunization.  Currently asymptomatic and LFT's are trending down, therefore will likely just recheck LFTs in 6 months (orders placed)  - Hepatitis A antibody IgM    2. Prediabetes  Pre-Diabetes    We discussed his last blood sugar level, and that this puts him in a pre-diabetic range.  We discussed ways to lower his " risk of developing diabetes through diet and exercise, such as eating a healthy diet high in lean protein, whole grain carbohydrates and choosing healthy fats such as olive oil or canola oil.   Will recheck level in 6 months.        - Hemoglobin A1c; Future  - Comprehensive metabolic panel; Future    FUTURE APPOINTMENTS:       - Follow-up visit in 6 months for fasting labs.    Helena Magaña PA-C  [facility]

## 2019-02-21 NOTE — LETTER
February 22, 2019      Farhat Tejada  [address] [address]        Dear ,    We are writing to inform you of your test results.    Your test results fall within the expected range(s) or remain unchanged from previous results.  Please continue with current treatment plan.    Resulted Orders   Hepatitis A antibody IgM   Result Value Ref Range    Hepatitis A IgM [place] Nonreactive NR^Nonreactive      Comment:      IgM anti-HAV not detected. Does not exclude the possibility of recent exposure   to or infection with HAV.       This is Dr. Mesa, a partner of Helena Magaña. Your blood test for the infection Hepatitis A was normal. There's no signs of this infection.     If you have any questions or concerns, please call the clinic at the number listed above.       Sincerely,        Helena Magaña PA-C

## 2019-02-22 LAB — HAV IGM SERPL QL IA: NONREACTIVE

## 2019-08-23 DIAGNOSIS — R73.03 PREDIABETES: ICD-10-CM

## 2019-08-23 LAB
ALBUMIN SERPL-MCNC: 4.5 G/DL (ref 3.4–5)
ALP SERPL-CCNC: 64 U/L (ref 40–150)
ALT SERPL W P-5'-P-CCNC: 77 U/L (ref 0–70)
ANION GAP SERPL CALCULATED.3IONS-SCNC: 5 MMOL/L (ref 3–14)
AST SERPL W P-5'-P-CCNC: 39 U/L (ref 0–45)
BILIRUB SERPL-MCNC: 2 MG/DL (ref 0.2–1.3)
BUN SERPL-MCNC: 14 MG/DL (ref 7–30)
CALCIUM SERPL-MCNC: 9.6 MG/DL (ref 8.5–10.1)
CHLORIDE SERPL-SCNC: 102 MMOL/L (ref 94–109)
CO2 SERPL-SCNC: 28 MMOL/L (ref 20–32)
CREAT SERPL-MCNC: 1.07 MG/DL (ref 0.66–1.25)
GFR SERPL CREATININE-BSD FRML MDRD: 75 ML/MIN/{1.73_M2}
GLUCOSE SERPL-MCNC: 121 MG/DL (ref 70–99)
HBA1C MFR BLD: 6 % (ref 0–5.6)
POTASSIUM SERPL-SCNC: 4.1 MMOL/L (ref 3.4–5.3)
PROT SERPL-MCNC: 8.1 G/DL (ref 6.8–8.8)
SODIUM SERPL-SCNC: 135 MMOL/L (ref 133–144)

## 2019-08-23 PROCEDURE — 83036 HEMOGLOBIN GLYCOSYLATED A1C: CPT | Performed by: PHYSICIAN ASSISTANT

## 2019-08-23 PROCEDURE — 36415 COLL VENOUS BLD VENIPUNCTURE: CPT | Performed by: PHYSICIAN ASSISTANT

## 2019-08-23 PROCEDURE — 80053 COMPREHEN METABOLIC PANEL: CPT | Performed by: PHYSICIAN ASSISTANT

## 2020-01-08 NOTE — PROGRESS NOTES
"  3  SUBJECTIVE:   CC: Farhat Tejada is an 59 year old male who presents for preventive health visit.   *Declines influenza vaccine  Healthy Habits:    Do you get at least three servings of calcium containing foods daily (dairy, green leafy vegetables, etc.)? yes    Amount of exercise or daily activities, outside of work: 3 day(s) per week-walking    Problems taking medications regularly not applicable    Medication side effects: No    Have you had an eye exam in the past two years? no    Do you see a dentist twice per year? yes    Do you have sleep apnea, excessive snoring or daytime drowsiness?yes    *Would like glucose checked due to family hx of diabetes mellitus    Was using ranitidine but due to the recall is on omeprazole 20 mg PRN (once every few weeks).    GERD controlled     NO blood or black in stool.     C/o bilateral knee pains that come and go (worse when standing).  After he walks a few minutes.   He used to camp a lot and wonders if he has lymes, has never been checked for this.      Still with calf cramps at night sometimes.  Sometimes will occur a few times per week and then sometimes will go months.  Has tried a topical spray and this seemed to help.     Was diagnosed with ALEXI (moderate) in 2019, tried CPAP but did not tolerate.     Has a \"boil\" on his toe (3rd digit on right) for the past 9 months.  It comes and goes.     Today's PHQ-2 Score:   PHQ-2 ( 1999 Pf[employer]) 1/10/2020 8/27/2018   Q1: Little interest or pleasure in doing things 0 0   Q2: Feeling down, depressed or hopeless 0 0   PHQ-2 Score 0 0       Abuse: Current or Past(Physical, Sexual or Emotional)- No  Do you feel safe in your environment? Yes      Social History     Tobacco Use     Smoking status: Never Smoker     Smokeless tobacco: Never Used   Substance Use Topics     Alcohol use: Yes     If you drink alcohol do you typically have >3 drinks per day or >7 drinks per week? No                      Last PSA: No results found for: " "PSA    Reviewed orders with patient. Reviewed health maintenance and updated orders accordingly - Yes  BP Readings from Last 3 Encounters:   01/10/20 126/80   02/21/19 120/78   11/05/18 119/81    Wt Readings from Last 3 Encounters:   01/10/20 98.4 kg (217 lb)   02/21/19 100 kg (220 lb 6.4 oz)   11/05/18 97.5 kg (215 lb)           Reviewed and updated as needed this visit by clinical staff  Tobacco  Allergies  Meds      [facility]    Reviewed and updated as needed this visit by Provider            ROS:  CONSTITUTIONAL: NEGATIVE for fever, chills, change in weight  INTEGUMENTARY/SKIN: NEGATIVE for worrisome rashes, moles or lesions  EYES: NEGATIVE for vision changes or irritation  ENT: NEGATIVE for ear, mouth and throat problems  RESP: NEGATIVE for significant cough or SOB  CV: NEGATIVE for chest pain, palpitations or peripheral edema  GI: NEGATIVE for nausea, abdominal pain, heartburn, or change in bowel habits   male: negative for dysuria, hematuria, decreased urinary stream, erectile dysfunction, urethral discharge  MUSCULOSKELETAL: NEGATIVE for significant arthralgias or myalgia  NEURO: NEGATIVE for weakness, dizziness or paresthesias  PSYCHIATRIC: NEGATIVE for changes in mood or affect    OBJECTIVE:   /80   Pulse 70   Temp 98.2  F (36.8  C) (Tympanic)   Resp 16   Ht 1.778 m (5' 10\")   Wt 98.4 kg (217 lb)   SpO2 100%   BMI 31.14 kg/m    EXAM:  GENERAL: healthy, alert and no distress  EYES: Eyes grossly normal to inspection, PERRL and conjunctivae and sclerae normal  HENT: ear canals and TM's normal, nose and mouth without ulcers or lesions  NECK: no adenopathy, no asymmetry, masses, or scars and thyroid normal to palpation  RESP: lungs clear to auscultation - no rales, rhonchi or wheezes  CV: regular rate and rhythm, normal S1 S2, no S3 or S4, no murmur, click or rub, no peripheral edema and peripheral pulses strong  ABDOMEN: soft, nontender, no hepatosplenomegaly, no masses and bowel " sounds normal  MS: no gross musculoskeletal defects noted, no edema  SKIN: no suspicious lesions or rashes  NEURO: Normal strength and tone, mentation intact and speech normal  PSYCH: mentation appears normal, affect normal/bright  Right 3rd digit with 3 mm cyst noted at base of nail (DIP) on dorsum aspect.  No surrounding redness or warmth.  No drainage.  Fluid is clear/yellow     GAIT: NORMAL  Dorsalis Pedis pulses intact bilaterally.  MUSCULOSKELETAL:  RIGHT and LEFT KNEE   Inspection: AP/lateral alignment normal, No effusion  Tender: none, he points to pain over medial joint line  Active Range of Motion: full flexion, full extension, no crepitus noted  Strength: normal  Special tests: normal Valgus stress test, negative Lachman's test  No warmth noted over bilateral knees.                 Diagnostic Test Results:  Labs reviewed in Epic    ASSESSMENT/PLAN:   1. Routine general medical examination at a [facility]       HEALTH CARE MAINTENANCE              Reviewed USPTF recommendations and anticipatory guidance.              See orders.   The meaning of a false positive PSA and a false negative PSA has been discussed, and the patient indicates his understanding of the limitations of this screening test.    I discussed the importance of colorectal cancer screening, including the risks and benefits of the various procedures, including colonoscopy and annual FOB immunoassay test.  Patient education materials given during examination today on colon cancer screening.   Patient has opted to do colonoscopy again.            2. ALEXI (obstructive sleep apnea)  He did not tolerate CPAP and is interested in the newer Inspire treatment.  Further discussion with sleep specialist advised.    - SLEEP EVALUATION & MANAGEMENT REFERRAL - ADULT -[facility] - [place]  [phone] (Age 15 and up); Future    3. Gastroesophageal reflux disease without esophagitis  With all the recent recalls, we are limited  on reflux meds.  We discussed long term use of PPIs in detail, patient understands risks associated with long term use which include C diff associated diarrhea, atrophic gastritis, fractures, B 12 deficiency and renal impairment  He uses PPI infrequently, will continue with this PRN.  Will try to go back to H2 blocker when those become available again.     4. Primary osteoarthritis of both knees    I discussed the pathophysiology of osteoarthritis and the progressive nature of this disease.  I spent time discussing lifestyle changes that may help with this, including weight loss (low weight bearing exercises ideal such as swimming/elliptical machine or biking), wearing comfortable/support athletic shoes, avoiding overuse of the knees.  I recommend he start ROM/strengthening exercises/ physical therapy.      We also discussed glucosamine/chondroitin as natural supplementations that may improve osteoarthritis.      May consider imaging if pain persists/worsens.       - Lyme Disease Vann[place] with reflex to WB Serum    5. Digital mucinous cyst of toe of right foot      6. Family history of diabetes mellitus  Due for screening  - Comprehensive metabolic panel    7. CARDIOVASCULAR SCREENING; LDL GOAL LESS THAN 160  Due for screening.   - Lipid panel reflex to direct LDL Fasting    8. Encounter for screening for HIV  Discussed CDC guidelines on preventative screening for this condition.    Patient would like screening done.         - HIV Antigen Antibody Combo    9. Immunization not carried out because of patient decision  Declines flu vaccine.     10. Screening for colon cancer  As above.   - GASTROENTEROLOGY ADULT REF PROCEDURE ONLY [facility] [phone]; [place] [facility]    11. Screening for prostate cancer  As above.   - Prostate spec antigen screen    COUNSELING:  Reviewed preventive health counseling, as reflected in patient instructions       Regular exercise       Healthy diet/nutrition       HIV  "screeninx in teen years, 1x in adult years, and at intervals if high risk       Colon cancer screening       Prostate cancer screening    Estimated body mass index is 31.14 kg/m  as calculated from the following:    Height as of this encounter: 1.778 m (5' 10\").    Weight as of this encounter: 98.4 kg (217 lb).    Weight management plan: Discussed healthy diet and exercise guidelines     reports that he has never smoked. He has never used smokeless tobacco.      Counseling Resources:  ATP IV Guidelines  Pooled Cohorts Equation Calculator  FRAX Risk Assessment  ICSI Preventive Guidelines  Dietary Guidelines for Americans, 2010  USDA's MyPlate  ASA Prophylaxis  Lung CA Screening    Helena Magaña PA-C  [facility]  "

## 2020-01-10 ENCOUNTER — OFFICE VISIT (OUTPATIENT)
Dept: FAMILY MEDICINE | Facility: CLINIC | Age: 60
End: 2020-01-10
Payer: COMMERCIAL

## 2020-01-10 VITALS
SYSTOLIC BLOOD PRESSURE: 126 MMHG | TEMPERATURE: 98.2 F | RESPIRATION RATE: 16 BRPM | DIASTOLIC BLOOD PRESSURE: 80 MMHG | BODY MASS INDEX: 31.07 KG/M2 | WEIGHT: 217 LBS | HEART RATE: 70 BPM | OXYGEN SATURATION: 100 % | HEIGHT: 70 IN

## 2020-01-10 DIAGNOSIS — Z13.6 CARDIOVASCULAR SCREENING; LDL GOAL LESS THAN 160: ICD-10-CM

## 2020-01-10 DIAGNOSIS — M67.471 DIGITAL MUCINOUS CYST OF TOE OF RIGHT FOOT: ICD-10-CM

## 2020-01-10 DIAGNOSIS — Z28.20 IMMUNIZATION NOT CARRIED OUT BECAUSE OF PATIENT DECISION: ICD-10-CM

## 2020-01-10 DIAGNOSIS — Z12.5 SCREENING FOR PROSTATE CANCER: ICD-10-CM

## 2020-01-10 DIAGNOSIS — M17.0 PRIMARY OSTEOARTHRITIS OF BOTH KNEES: ICD-10-CM

## 2020-01-10 DIAGNOSIS — Z00.00 ROUTINE GENERAL MEDICAL EXAMINATION AT A HEALTH CARE FACILITY: Primary | ICD-10-CM

## 2020-01-10 DIAGNOSIS — K21.9 GASTROESOPHAGEAL REFLUX DISEASE WITHOUT ESOPHAGITIS: ICD-10-CM

## 2020-01-10 DIAGNOSIS — Z12.11 SCREENING FOR COLON CANCER: ICD-10-CM

## 2020-01-10 DIAGNOSIS — Z11.4 ENCOUNTER FOR SCREENING FOR HIV: ICD-10-CM

## 2020-01-10 DIAGNOSIS — G47.33 OSA (OBSTRUCTIVE SLEEP APNEA): ICD-10-CM

## 2020-01-10 DIAGNOSIS — Z83.3 FAMILY HISTORY OF DIABETES MELLITUS: ICD-10-CM

## 2020-01-10 LAB
ALBUMIN SERPL-MCNC: 4.3 G/DL (ref 3.4–5)
ALP SERPL-CCNC: 60 U/L (ref 40–150)
ALT SERPL W P-5'-P-CCNC: 63 U/L (ref 0–70)
ANION GAP SERPL CALCULATED.3IONS-SCNC: 4 MMOL/L (ref 3–14)
AST SERPL W P-5'-P-CCNC: 38 U/L (ref 0–45)
BILIRUB SERPL-MCNC: 1.9 MG/DL (ref 0.2–1.3)
BUN SERPL-MCNC: 10 MG/DL (ref 7–30)
CALCIUM SERPL-MCNC: 9.4 MG/DL (ref 8.5–10.1)
CHLORIDE SERPL-SCNC: 104 MMOL/L (ref 94–109)
CHOLEST SERPL-MCNC: 183 MG/DL
CO2 SERPL-SCNC: 28 MMOL/L (ref 20–32)
CREAT SERPL-MCNC: 1.02 MG/DL (ref 0.66–1.25)
GFR SERPL CREATININE-BSD FRML MDRD: 79 ML/MIN/{1.73_M2}
GLUCOSE SERPL-MCNC: 122 MG/DL (ref 70–99)
HDLC SERPL-MCNC: 54 MG/DL
LDLC SERPL CALC-MCNC: 107 MG/DL
NONHDLC SERPL-MCNC: 129 MG/DL
POTASSIUM SERPL-SCNC: 4.6 MMOL/L (ref 3.4–5.3)
PROT SERPL-MCNC: 7.7 G/DL (ref 6.8–8.8)
PSA SERPL-ACNC: 0.7 UG/L (ref 0–4)
SODIUM SERPL-SCNC: 136 MMOL/L (ref 133–144)
TRIGL SERPL-MCNC: 108 MG/DL

## 2020-01-10 PROCEDURE — 80061 LIPID PANEL: CPT | Performed by: PHYSICIAN ASSISTANT

## 2020-01-10 PROCEDURE — 86618 LYME DISEASE ANTIBODY: CPT | Performed by: PHYSICIAN ASSISTANT

## 2020-01-10 PROCEDURE — G0103 PSA SCREENING: HCPCS | Performed by: PHYSICIAN ASSISTANT

## 2020-01-10 PROCEDURE — 80053 COMPREHEN METABOLIC PANEL: CPT | Performed by: PHYSICIAN ASSISTANT

## 2020-01-10 PROCEDURE — 99396 PREV VISIT EST AGE 40-64: CPT | Performed by: PHYSICIAN ASSISTANT

## 2020-01-10 PROCEDURE — 87389 HIV-1 AG W/HIV-1&-2 AB AG IA: CPT | Performed by: PHYSICIAN ASSISTANT

## 2020-01-10 PROCEDURE — 99213 OFFICE O/P EST LOW 20 MIN: CPT | Mod: 25 | Performed by: PHYSICIAN ASSISTANT

## 2020-01-10 PROCEDURE — 36415 COLL VENOUS BLD VENIPUNCTURE: CPT | Performed by: PHYSICIAN ASSISTANT

## 2020-01-10 ASSESSMENT — MIFFLIN-ST. JEOR: SCORE: 1800.56

## 2020-01-13 LAB
B BURGDOR IGG+IGM SER QL: 0.04 (ref 0–0.89)
HIV 1+2 AB+HIV1 P24 AG SERPL QL IA: NONREACTIVE

## 2020-12-06 ENCOUNTER — HEALTH MAINTENANCE LETTER (OUTPATIENT)
Age: 60
End: 2020-12-06

## 2021-02-20 ENCOUNTER — HEALTH MAINTENANCE LETTER (OUTPATIENT)
Age: 61
End: 2021-02-20

## 2021-04-05 ENCOUNTER — OFFICE VISIT (OUTPATIENT)
Dept: FAMILY MEDICINE | Facility: CLINIC | Age: 61
End: 2021-04-05
Payer: COMMERCIAL

## 2021-04-05 ENCOUNTER — ANCILLARY PROCEDURE (OUTPATIENT)
Dept: GENERAL RADIOLOGY | Facility: CLINIC | Age: 61
End: 2021-04-05
Attending: PHYSICIAN ASSISTANT
Payer: COMMERCIAL

## 2021-04-05 VITALS
DIASTOLIC BLOOD PRESSURE: 88 MMHG | HEART RATE: 72 BPM | SYSTOLIC BLOOD PRESSURE: 135 MMHG | TEMPERATURE: 97.8 F | WEIGHT: 217 LBS | BODY MASS INDEX: 31.14 KG/M2 | OXYGEN SATURATION: 100 %

## 2021-04-05 DIAGNOSIS — M54.12 CERVICAL RADICULOPATHY: Primary | ICD-10-CM

## 2021-04-05 PROCEDURE — 72040 X-RAY EXAM NECK SPINE 2-3 VW: CPT | Performed by: RADIOLOGY

## 2021-04-05 PROCEDURE — 99213 OFFICE O/P EST LOW 20 MIN: CPT | Performed by: PHYSICIAN ASSISTANT

## 2021-04-05 NOTE — PATIENT INSTRUCTIONS
I suggest ibuprofen 400 mg twice per day with food for 7 days then as needed after that.     Daily stretching and start physical therapy for your neck.           Patient Education     Quadruped Arm-Reach Exercise       Do this exercise on your hands and knees. Keep your knees under your hips and your hands under your shoulders. Keep your spine in a neutral position (not arched or sagging). Keep your ears in line with your shoulders. Hold for a few seconds before starting the exercise:   1. Tighten your belly muscles (core) and raise 1 arm straight in front of you, palm down. Hold for 5 seconds, then lower. Repeat 5 times.  2. Do the exercise again, this time lifting your arm to the side. Repeat 5 times.  3. Do the exercise again, this time lifting your arm backward, palm up. Repeat 5 times.  Switch sides and do each exercise with the other arm.   Note: This exercise may not be advised after certain shoulder surgeries. Talk with your healthcare provider before doing it.   [employer] last reviewed this educational content on 7/1/2020 2000-2020 [employer]. All rights reserved. This information is not intended as a substitute for professional medical care. Always follow your healthcare professional's instructions.           Patient Education     Shoulder, Upper Back, and Arm Exercise: Overhead Arm Stretch   To start, stand tall with your ears, shoulders, and hips in line. Your feet should be slightly apart, positioned just under your hips. Focus your eyes directly in front of you. Stay in this position for a few seconds before starting the exercise. This helps increase your awareness of proper posture. You can also do this exercise sitting up in a sturdy chair. Before doing this exercise, talk with your healthcare provider to make sure it's safe for you to do.        Reach overhead and slightly back with both arms. Keep your shoulders and neck aligned and your elbows behind your shoulders:     With your  palms facing the ceiling, turn your fingers inward. You can also do this exercise holding weights if directed by your healthcare provider or physical therapist.    Take a deep breath. Breathe out and lower your elbows toward your buttocks. Hold for up to 5 seconds, then return to starting position.    Repeat 3 times, or as directed by your healthcare provider or physical therapist.  [employer] last reviewed this educational content on 7/1/2020 2000-2020 [employer]. All rights reserved. This information is not intended as a substitute for professional medical care. Always follow your healthcare professional's instructions.           Patient Education     Shoulder Clock Exercise    To start, stand tall with your ears, shoulders, and hips in line. Your feet should be slightly apart, positioned just under your hips. Focus your eyes directly in front of you.  this position for a few seconds before starting your exercise. This helps increase your awareness of proper posture.    Imagine that your right shoulder is the center of a clock. With the outer point of your shoulder, roll it around to slowly trace the outer edge of the clock.    Move clockwise first, then counterclockwise.    Repeat 3 to 5 times. Switch shoulders.  [employer] last reviewed this educational content on 3/1/2018    2601-1139 [employer]. All rights reserved. This information is not intended as a substitute for professional medical care. Always follow your healthcare professional's instructions.           Patient Education     Shoulder Shrug Exercise    To start, sit in a chair with your feet flat on the floor. Shift your weight slightly forward so you don't round your back. Relax. Keep your ears, shoulders, and hips aligned:     Raise both of your shoulders as high as you can, as if you were trying to touch them to your ears. Keep your head and neck still and relaxed.    Hold for a count  of 10. Release.    Repeat 5 times.  For your safety, check with your healthcare provider before starting an exercise program.   [employer] last reviewed this educational content on 7/1/2020 2000-2020 [employer]. All rights reserved. This information is not intended as a substitute for professional medical care. Always follow your healthcare professional's instructions.           Patient Education     Shoulder Squeeze Exercise    To start, sit in a chair with your feet flat on the floor. Shift your weight slightly forward so you don't round your back. Relax. Keep your ears, shoulders, and hips aligned:     Raise your arms to shoulder height, elbows bent and palms forward.    Move your arms back, squeezing your shoulder blades together.    Hold for 10 seconds. Return to starting position.     Repeat 5 times.   For your safety, check with your healthcare provider before starting an exercise program.   [employer] last reviewed this educational content on 7/1/2020 2000-2020 [employer]. All rights reserved. This information is not intended as a substitute for professional medical care. Always follow your healthcare professional's instructions.           Patient Education     Neck Exercises: Neck Flex  To start, sit in a chair with your feet flat on the floor. Your weight should be slightly forward so that you re balanced evenly on your buttocks. Relax your shoulders and keep your head level. Avoid arching your back or rounding your shoulders. Using a chair with arms may help you keep your balance:    Rest the back of your left hand against your lower back. Place your right palm on the top of your head.    Gently pull your head forward and down until you feel a stretch in the muscles in the back of your neck. Don t force the motion.    Hold for 20 seconds, then return to starting position. Switch arms.    Repeat 5 to 10 times.    [employer] last reviewed this educational content on 3/1/2018     6064-2749 [employer]. All rights reserved. This information is not intended as a substitute for professional medical care. Always follow your healthcare professional's instructions.           Patient Education     Neck Exercises: Head Lifts    Do this exercise on your hands and knees. Keep your knees under your hips and your hands under your shoulders. Keep your spine in a neutral position (not arched or sagging). Keep your ears in line with your shoulders. Hold for a few seconds before starting the exercise:    Keeping your back straight, slowly drop your chin toward your chest. Tuck in your chin.    Hold for 5 seconds. Then lift your head until your neck is level with your back.    Hold for 5 seconds. Repeat 5 to10 times.  [employer] last reviewed this educational content on 3/1/2018    6039-0444 [employer]. All rights reserved. This information is not intended as a substitute for professional medical care. Always follow your healthcare professional's instructions.           Patient Education     Neck Exercises: Active Neck Rotation    To start, lie on your back, knees bent and feet flat on the floor. Keep your ears, shoulders, and hips aligned, but don t press your lower back to the floor. Rest your hands on your pelvis. Breathe deeply and relax.   Here are the steps for the active neck rotation:    Use your neck muscles to turn your head to one side until you feel a stretch in the muscles.    Hold for  5 seconds. Then turn to the other side.    Repeat  5 times on each side.  Note: Keep your shoulders on the floor. Don t lift or tuck your chin as you turn your head.   [employer] last reviewed this educational content on 7/1/2020 2000-2020 [employer]. All rights reserved. This information is not intended as a substitute for professional medical care. Always follow your healthcare professional's instructions.

## 2021-04-05 NOTE — PROGRESS NOTES
Assessment & Plan     Cervical radiculopathy  I suggest ibuprofen 400 mg twice per day with food for 7 days then as needed after that.     Daily stretching and start physical therapy for your neck.      Neck Sprain and Strain  (primary encounter diagnosis)    Discussed the general treatment measures which include:  -Ice/Heat (patient not to fall asleep with heat pad on).  -No pillow; avoiding hyperflexed posture of the neck.  -Med including Tylenol, NSAIDs, muscle relaxants, and the role of narcotic meds in this setting  See epic for orders  -Neck exercises (patient education materials given)  -physical therapy ordered as well.                      - XR Cervical Spine 2/3 Views  - [facility]; Future    Xray:   IMPRESSION: Mild reversal of the usual cervical lordosis. 2 mm  retrolisthesis of C5 on C6. Moderate degenerative interspace narrowing  C5-C6 and C6-C7. More mild interspace degeneration elsewhere. No  prevertebral soft tissue swelling. Mild facet arthropathy.     CAMILO LO MD    20 minutes spent on the date of the encounter doing chart review, history and exam, documentation and further activities per the note           Return in about 4 weeks (around 5/3/2021) for a recheck if symptoms do not improve.    GRECIA Davila [facility] ASHLEE Dougherty is a 60 year old who presents for the following health issues     HPI     Musculoskeletal problem/pain  Onset/Duration: since halloween  Description  Location: Neck, shoulder and into arm-down to fingers - left  Joint Swelling: no  Redness: no  Pain: YES  Warmth: no  Intensity:  moderate  Progression of Symptoms:  same  Accompanying signs and symptoms:   Fevers: no  Numbness/tingling/weakness: YES- Shoulder goes numb/asleep  History  Trauma to the area: no  Recent illness:  no  Previous similar problem: no  Previous evaluation:  no  Precipitating or alleviating factors:  Aggravating factors include:  sittings till and moving will both affect it. If readfing and looking down it will get worse.  Therapies tried and outcome: Ibuprofen 600-800 mg for a few days didn't help and CBD oil is helping much better.     Pain was radiating down to hands.  Now using CBD and pain has come down from 6 to a 2 out of 10.    Now numbness is the most significant symptoms, has had this the entire time.    Numbness stays in the left shoulder mainly, it does come and go.   No weakness, he is not dropping things.     No injury, pain started while doing Yoga.   ROM of neck triggers the pain.     Does not wake him up.           Review of Systems   Constitutional, HEENT, cardiovascular, pulmonary, gi and gu systems are negative, except as otherwise noted.      Objective    /88   Pulse 72   Temp 97.8  F (36.6  C) (Tympanic)   Wt 98.4 kg (217 lb)   SpO2 100%   BMI 31.14 kg/m    Body mass index is 31.14 kg/m .  Physical Exam   GENERAL: healthy, alert and no distress  SKIN: no suspicious lesions or rashes  NEURO: Normal strength and tone, mentation intact and speech normal      GAIT: NORMAL    MUSCULOSKELETAL:  Inspection: normal cervical lordosis  Tender:  none  Range of Motion:  Full ROM of cervical spine, however pain reproduced with neck flexion and rotation.   Strength: Full strength of all neck muscles    Shoulder:   Inspection: No obvious deformity, asymmetry, muscle atrophy or abnormal motion noted.   Palpation:  No pain over AC or SC joint, acromion and subacromial space, bicipital groove and greater/less tubercles, scapular spine and adjacent musculature, cervical spine.    External/Internal rotation strength:  full  Abduction/Adduction strength: full  Biceps/Triceps strength: normal  Capillary refills less than 2 seconds and radial pulses normal bilaterally.   Sensation intact bilateral fingers, hands and arms.  X-ray: ordered, but results not yet available.      Negative Phalen's sign.   Tinnel's sign did reproduce pain  in left shoulder (however this may have been residual from previous stretching/ROM of neck).

## 2021-04-06 PROBLEM — M54.12 CERVICAL RADICULOPATHY: Status: ACTIVE | Noted: 2021-04-06

## 2021-04-12 NOTE — PROGRESS NOTES
[facility] Physical Therapy Initial Evaluation  [id number]     Precautions/Restrictions/MD instructions: evaluate and treat neck pain/cervical radiculopathy    Therapist Assessment: Farhat Tejada is a 60 year old male patient presenting to Physical Therapy with chronic neck and arm pain/numbness. Patient demonstrates decreased ROM, decreased strength, impaired posture. Signs and symptoms are consistent with chronic mechanical neck pain. These impairments limit their ability to sit, work. Skilled PT services are necessary in order to reduce impairments and improve independent function.    Subjective:   Chief Complaint: pain from neck to left shoulder and left arm.    Associated symptoms: shoulder becomes numb/tingly - at this point occurs 3-5 times per day for 1 minute while sitting   Onset date: 10/23/88731   SABRINA: traumatic vs insidious - pain started while doing Yoga but unsure if related   Pain severity: 2/10 at rest; 2/10 current, 2/10 worst - numbness is primary complaint at this point  Location of pain: left side of the neck, occasional radiation down the left hand, hasn't happened in a few weeks  Quality of Pain: constant ache   Better: stretching temporary help, CBD helps with pain, ibuprofen less so  Worse: ROM of neck triggers the pain, particularly looking down or turning left  Time of day: after work  Progression of Symptoms since onset:  Since onset, these symptoms are Gradually getting better.  Previous treatment: has included none; effect was NA  Current Functional Status: impaired  Previous Functional Status:  fully functional prior to pain onset/injury.  Outcome measure: NDI 6%      General health as reported by patient: fair.    PMH: anemia, sleep disorder/apnea   Surgical history/trauma: vasectomy. He denies any significant current illness or recent hospital admissions. He denies any regional implanted devices.  Imaging: Xray - IMPRESSION: Mild reversal of the usual cervical lordosis. 2 mm  "retrolisthesis of C5 on C6. Moderate degenerative interspace narrowing C5-C6 and C6-C7. More mild interspace degeneration elsewhere. No prevertebral soft tissue swelling. Mild facet arthropathy.  Medications: anti-inflammatory    Occupation: business owner Job duties: computer work, prolonged sitting  Current exercise regimen/Recreational activities: walking 3 X week  Barriers to treatment: none    Red Flags: (Bold when present) - reviewed the following and denies  Vertebrobasilar Artery   Symptom   Dysphagia Drop Attack   Dysarthria Dizziness   Diplopia Paresthesia of lips   Spinal Cord  Symptom   Bi/Quadriparesthesia Ataxia   Hemiparesthesia Urinary incontinence   Bi/Quadriparesis Fecal incontinence     Cranial Nerves - bold when abnormality is present  Cranial nerve   II-Scotoma VIII-Loss of Balance   III-Diplopia VIII-Hypoacousia   V-Facial paresthesia IX-Dysarthria   VII-Altered Taste IX-Dysphagia    X-Nausea         Patient's Goal(s): \"fix it\", ultimately get off CBD and ibuprofen and continue daily activities    Objective  Neuro Screen    Reflexes Dermatome (H/h/N) Myotome    R L  R L  R L   C1-3     Head/neck   Neck flexion 5/5 5/5   C4   Clavicle region   Upper trap 5/5 5/5   C5  0 0 Lateral forearm N N Deltoid/  Supraspinatus 5/5 5/5   C6 0 0 Thumb N N Biceps/wrist ex 5/5 5/5   C7  0 0 Middle finger N N Triceps/ wrist flex 5/5 5/5   C8   Fifth finger N N Thumb ext/Ul. Dev 5/5 5/5   T1   Medial forearm N N Adduction of fingers 5/5 5/5     Strength (lb)     KEY: H=Hypersensitive; h=hyposensitive; N=normal; NT=not tested      Upper Motor Neuron Tests  Clonus  Hagan    Babinski        Posture/Observation:  Poor, sits with rounded shoulders and forward head  NE on pain with posture correction X 2 min    AROM: (Major, Moderate, Minimal or Nil loss)  Movement Loss Ramirez Mod Min Nil Pain   Protrusion    X    Flexion   X     Retraction  X   End range left side of neck   Extension   X  End range left side of " neck   Left Rotation  X   End range left side of neck   Right Rotation   X  End range left side of neck   Left Side Bending   X  End range left side of neck   Right Side bending   X  End range left side of neck     Repeated movement testing:   (During: produces, abolishes, increases, decreases, no effect, centralizing, peripheralizing; After: better, worse, no better, no worse, no effect, centralized, peripheralized)    Pre-test Symptoms Sitting: left side neck 2/10  Blank items not tested Symptoms During Symptoms After ROM increased ROM decreased No Effect   PRO NE NE      Rep PRO periph NW  X    RET inc NW      Rep RET dec better X     RET EXT        Rep RET EXT        LF - R        Rep LF - R        LF - L        Rep LF - L        ROT - R        Rep ROT - R        ROT - L        Rep ROT - L        FLEX          Ligamentous Stability Testing: NT       Neural Mobility Test: NT       Muscular Testing:   Flexibility: WNL   Strength:     Lower trapezius: 3/5    Middle trapezius: 4/5    Serratus Anterior: NT    Deep neck flexor endurance: WNL    Neck extensors    Sensorimotor testing:   Cervical joint position sense: NT      Special tests:      Other tests:   Hypomobile C5-T2, pain with springing on L       Palpation: TTP bilat UT/LS, worse on L      ASSESSMENT/PLAN  Patient is a 42 year old male with cervical complaints.    Patient has the following significant findings with corresponding treatment plan.                Diagnosis 1:  Chronic mechanical neck pain    Pain -  hot/cold therapy, manual therapy, education, directional preference exercise and home program  Decreased ROM/flexibility - manual therapy, therapeutic exercise and home program  Decreased joint mobility - manual therapy, therapeutic exercise and home program  Decreased strength - therapeutic exercise, therapeutic activities and home program  Impaired muscle performance - neuro re-education and home program  Impaired posture - neuro re-education and  home program    Therapy Evaluation Codes:   1) History comprised of:   Personal factors that impact the plan of care:      Past/current experiences and Time since onset of symptoms.    Comorbidity factors that impact the plan of care are:      None.     Medications impacting care: Pain.  2) Examination of Body Systems comprised of:   Body structures and functions that impact the plan of care:      Cervical spine.   Activity limitations that impact the plan of care are:      Sitting and Working.  3) Clinical presentation characteristics are:   Evolving/Changing.  4) Decision-Making    Moderate complexity using standardized patient assessment instrument and/or measureable assessment of functional outcome.  Cumulative Therapy Evaluation is: Moderate complexity.    Previous and current functional limitations:  (See Goal Flow Sheet for this information)    Short term and Long term goals: (See Goal Flow Sheet for this information)     Communication ability:  Patient appears to be able to clearly communicate and understand verbal and written communication and follow directions correctly.  Treatment Explanation - The following has been discussed with the patient:   RX ordered/plan of care  Anticipated outcomes  Possible risks and side effects  This patient would benefit from PT intervention to resume normal activities.   Rehab potential is excellent.    Frequency:  1 X week, once daily  Duration:  for 4-6 weeks  Discharge Plan:  Achieve all LTG.  Independent in home treatment program.  Reach maximal therapeutic benefit.    Please refer to the daily flowsheet for treatment today, total treatment time and time spent performing 1:1 timed codes.

## 2021-04-13 ENCOUNTER — THERAPY VISIT (OUTPATIENT)
Dept: PHYSICAL THERAPY | Facility: CLINIC | Age: 61
End: 2021-04-13
Attending: PHYSICIAN ASSISTANT
Payer: COMMERCIAL

## 2021-04-13 DIAGNOSIS — M54.12 CERVICAL RADICULOPATHY: ICD-10-CM

## 2021-04-13 PROCEDURE — 97530 THERAPEUTIC ACTIVITIES: CPT | Mod: GP | Performed by: PHYSICAL THERAPIST

## 2021-04-13 PROCEDURE — 97110 THERAPEUTIC EXERCISES: CPT | Mod: GP | Performed by: PHYSICAL THERAPIST

## 2021-04-13 PROCEDURE — 97162 PT EVAL MOD COMPLEX 30 MIN: CPT | Mod: GP | Performed by: PHYSICAL THERAPIST

## 2021-04-20 ENCOUNTER — THERAPY VISIT (OUTPATIENT)
Dept: PHYSICAL THERAPY | Facility: CLINIC | Age: 61
End: 2021-04-20
Payer: COMMERCIAL

## 2021-04-20 DIAGNOSIS — M54.12 CERVICAL RADICULOPATHY: ICD-10-CM

## 2021-04-20 PROCEDURE — 97140 MANUAL THERAPY 1/> REGIONS: CPT | Mod: GP | Performed by: PHYSICAL THERAPIST

## 2021-04-20 PROCEDURE — 97110 THERAPEUTIC EXERCISES: CPT | Mod: GP | Performed by: PHYSICAL THERAPIST

## 2021-04-20 PROCEDURE — 97530 THERAPEUTIC ACTIVITIES: CPT | Mod: GP | Performed by: PHYSICAL THERAPIST

## 2021-04-27 ENCOUNTER — THERAPY VISIT (OUTPATIENT)
Dept: PHYSICAL THERAPY | Facility: CLINIC | Age: 61
End: 2021-04-27
Payer: COMMERCIAL

## 2021-04-27 DIAGNOSIS — M54.12 CERVICAL RADICULOPATHY: ICD-10-CM

## 2021-04-27 PROCEDURE — 97110 THERAPEUTIC EXERCISES: CPT | Mod: GP | Performed by: PHYSICAL THERAPIST

## 2021-04-27 PROCEDURE — 97530 THERAPEUTIC ACTIVITIES: CPT | Mod: GP | Performed by: PHYSICAL THERAPIST

## 2021-04-27 NOTE — PROGRESS NOTES
DISCHARGE REPORT    Progress reporting period is from 4/13/21 to 4/27/21.       SUBJECTIVE  Subjective changes noted by patient: Patient reports he continues to feel better. When he feels his shoulder going numb, he does the exercises and that takes care of it. He feels the numbness less and less the last week. Pain is non-existant at this point. Has only done the scapular exercises a few times.     Current pain level is 0/10.     Previous pain level was 2/10.   Changes in function:  Yes (See Goal flowsheet attached for changes in current functional level)  Adverse reaction to treatment or activity: None    OBJECTIVE  Changes noted in objective findings: Cervical spine AROM flexion WNL, ext min loss; ROT R WNL, L min loss; SB R WNL, L min loss. No motion creates pain today. Good performance of posture correction and exercises.     ASSESSMENT/PLAN  Updated problem list and treatment plan: Chronic mechanical neck pain    Pain -  directional preference exercise and home program  Decreased ROM/flexibility - home program  Decreased joint mobility - home program  Decreased strength - home program  Impaired muscle performance - home program  Impaired posture - home program  STG/LTGs have been met or progress has been made towards goals:  Yes (See Goal flow sheet completed today.)  Assessment of Progress: The patient's condition is improving.  The patient's condition has potential to improve.  Self Management Plans:  Patient has been instructed in a home treatment program.  I have re-evaluated this patient and find that the nature, scope, duration and intensity of the therapy is appropriate for the medical condition of the patient.  Farhat continues to require the following intervention to meet STG and LTG's:  PT intervention is no longer required to meet STG/LTG.    Recommendations:  This patient is ready to be discharged from therapy and continue their home treatment program.    Please refer to the daily flowsheet for  treatment today, total treatment time and time spent performing 1:1 timed codes.

## 2021-09-25 ENCOUNTER — HEALTH MAINTENANCE LETTER (OUTPATIENT)
Age: 61
End: 2021-09-25

## 2022-03-12 ENCOUNTER — HEALTH MAINTENANCE LETTER (OUTPATIENT)
Age: 62
End: 2022-03-12

## 2022-03-17 ENCOUNTER — TELEPHONE (OUTPATIENT)
Dept: FAMILY MEDICINE | Facility: CLINIC | Age: 62
End: 2022-03-17

## 2022-03-17 ENCOUNTER — E-VISIT (OUTPATIENT)
Dept: FAMILY MEDICINE | Facility: CLINIC | Age: 62
End: 2022-03-17
Payer: COMMERCIAL

## 2022-03-17 DIAGNOSIS — R36.9 PENILE DISCHARGE: Primary | ICD-10-CM

## 2022-03-17 PROCEDURE — 99207 PR NON-BILLABLE SERV PER CHARTING: CPT | Performed by: PHYSICIAN ASSISTANT

## 2022-03-18 ENCOUNTER — OFFICE VISIT (OUTPATIENT)
Dept: FAMILY MEDICINE | Facility: CLINIC | Age: 62
End: 2022-03-18
Payer: COMMERCIAL

## 2022-03-18 VITALS
OXYGEN SATURATION: 98 % | BODY MASS INDEX: 28.98 KG/M2 | HEART RATE: 88 BPM | WEIGHT: 207 LBS | SYSTOLIC BLOOD PRESSURE: 118 MMHG | TEMPERATURE: 97.9 F | DIASTOLIC BLOOD PRESSURE: 80 MMHG | RESPIRATION RATE: 14 BRPM | HEIGHT: 71 IN

## 2022-03-18 DIAGNOSIS — N48.1 BALANITIS: Primary | ICD-10-CM

## 2022-03-18 DIAGNOSIS — R81 GLUCOSURIA: ICD-10-CM

## 2022-03-18 DIAGNOSIS — R30.0 DYSURIA: ICD-10-CM

## 2022-03-18 DIAGNOSIS — E11.9 TYPE 2 DIABETES MELLITUS WITHOUT COMPLICATION, WITHOUT LONG-TERM CURRENT USE OF INSULIN (H): Primary | ICD-10-CM

## 2022-03-18 DIAGNOSIS — R73.03 PREDIABETES: ICD-10-CM

## 2022-03-18 LAB
ALBUMIN UR-MCNC: NEGATIVE MG/DL
APPEARANCE UR: CLEAR
BACTERIA #/AREA URNS HPF: ABNORMAL /HPF
BILIRUB UR QL STRIP: NEGATIVE
COLOR UR AUTO: YELLOW
GLUCOSE UR STRIP-MCNC: >=1000 MG/DL
HBA1C MFR BLD: 11 % (ref 0–5.6)
HGB UR QL STRIP: ABNORMAL
KETONES UR STRIP-MCNC: NEGATIVE MG/DL
LEUKOCYTE ESTERASE UR QL STRIP: NEGATIVE
NITRATE UR QL: NEGATIVE
PH UR STRIP: 5.5 [PH] (ref 5–7)
RBC #/AREA URNS AUTO: ABNORMAL /HPF
SP GR UR STRIP: 1.01 (ref 1–1.03)
SQUAMOUS #/AREA URNS AUTO: ABNORMAL /LPF
UROBILINOGEN UR STRIP-ACNC: 0.2 E.U./DL
WBC #/AREA URNS AUTO: ABNORMAL /HPF

## 2022-03-18 PROCEDURE — 81001 URINALYSIS AUTO W/SCOPE: CPT | Performed by: PHYSICIAN ASSISTANT

## 2022-03-18 PROCEDURE — 36415 COLL VENOUS BLD VENIPUNCTURE: CPT | Performed by: PHYSICIAN ASSISTANT

## 2022-03-18 PROCEDURE — 99214 OFFICE O/P EST MOD 30 MIN: CPT | Performed by: PHYSICIAN ASSISTANT

## 2022-03-18 PROCEDURE — 83036 HEMOGLOBIN GLYCOSYLATED A1C: CPT | Performed by: PHYSICIAN ASSISTANT

## 2022-03-18 RX ORDER — METFORMIN HCL 500 MG
500 TABLET, EXTENDED RELEASE 24 HR ORAL 2 TIMES DAILY WITH MEALS
Qty: 60 TABLET | Refills: 0 | Status: SHIPPED | OUTPATIENT
Start: 2022-03-18 | End: 2022-04-06

## 2022-03-18 RX ORDER — FLUCONAZOLE 150 MG/1
150 TABLET ORAL ONCE
Qty: 1 TABLET | Refills: 0 | Status: SHIPPED | OUTPATIENT
Start: 2022-03-18 | End: 2022-03-18

## 2022-03-18 ASSESSMENT — PAIN SCALES - GENERAL: PAINLEVEL: NO PAIN (0)

## 2022-03-18 NOTE — PROGRESS NOTES
Assessment & Plan     Dysuria  Balanitis  Glucosuria  Prediabetes  Patient is a 61-year-old male with past medical history significant for prediabetes who presents to clinic due to 3 months of penile itching, white film over glans, discharge, and redness of foreskin.  Vital signs normal.  Exam findings are consistent with balanitis.  Urinalysis without UTI.  No concerns for STIs.  Urinalysis does show significant amount of glucose suggesting patient has progressed from prediabetes to diabetes.  Will complete A1c for further evaluation.  Given 3 months of symptoms, will treat for both fungal as well as bacterial cause.  Patient prescribed Augmentin and fluconazole.  Return and urgent/emergent follow-up precautions provided.  - UA macro with reflex to Microscopic and Culture - Clinc Collect  - Urine Microscopic  - fluconazole (DIFLUCAN) 150 MG tablet; Take 1 tablet (150 mg) by mouth once for 1 dose  - amoxicillin-clavulanate (AUGMENTIN) 875-125 MG tablet; Take 1 tablet by mouth 2 times daily for 7 days  - Hemoglobin A1c; Future  - Hemoglobin A1c      See Patient Instructions    Return in about 2 weeks (around 4/1/2022) for Reevaluation.    Lucero Small PA-C  [facility] ASHLEE Dougherty is a 61 year old who presents for the following health issues     HPI     Genitourinary - Male  Onset/Duration: 3 months ago patient noticed white film over glans of penis. He used an antifungal for a few weeks which got rid of the itching and film. Foreskin started swelling after this. He continued to use the antifungal which helped but swelling of foreskin returned around 6 weeks ago.   Description:   Dysuria (painful urination): no  Hematuria (blood in urine): no  Frequency: no  Waking at night to urinate: YES  Hesitancy (delay in urine): no  Retention (unable to empty): no  Decrease in urinary flow: YES  Incontinence: no  Progression of Symptoms:  same  Accompanying Signs & Symptoms:  Fever:  "no  Back/Flank pain: no  Urethral discharge: no  Testicle lumps/masses/pain: no  Nausea and/or vomiting: no  Abdominal pain: no  History:   History of frequent UTI s: no  History of kidney stones: no  History of hernias: no  Personal or Family history of Prostate problems: no  Sexually active: yes-no new partners   Precipitating or alleviating factors: None  Therapies tried and outcome: none          Objective    /80   Pulse 88   Temp 97.9  F (36.6  C) (Tympanic)   Resp 14   Ht 1.803 m (5' 11\")   Wt 93.9 kg (207 lb)   SpO2 98%   BMI 28.87 kg/m    Body mass index is 28.87 kg/m .  Physical Exam  Vitals and nursing note reviewed.   Constitutional:       General: He is not in acute distress.     Appearance: Normal appearance.   HENT:      Head: Normocephalic and atraumatic.      Mouth/Throat:      Mouth: Mucous membranes are moist.      Pharynx: Oropharynx is clear.   Eyes:      Extraocular Movements: Extraocular movements intact.      Pupils: Pupils are equal, round, and reactive to light.   Cardiovascular:      Rate and Rhythm: Normal rate and regular rhythm.      Heart sounds: Normal heart sounds.   Pulmonary:      Effort: Pulmonary effort is normal.      Breath sounds: Normal breath sounds.   Abdominal:      General: Bowel sounds are normal.      Palpations: Abdomen is soft.      Tenderness: There is no abdominal tenderness.   Genitourinary:     Comments:  exam: Cloudy penile discharge.  No swelling.  Foreskin with erythema and mild swelling.   Musculoskeletal:         General: Normal range of motion.      Cervical back: Normal range of motion.   Skin:     General: Skin is warm and dry.   Neurological:      General: No focal deficit present.      Mental Status: He is alert.   Psychiatric:         Mood and Affect: Mood normal.         Behavior: Behavior normal.            Results for orders placed or performed in visit on 03/18/22   UA macro with reflex to Microscopic and Culture - Clinc Collect     " Status: Abnormal    Specimen: Urine, Clean Catch   Result Value Ref Range    Color Urine Yellow Colorless, Straw, Light Yellow, Yellow    Appearance Urine Clear Clear    Glucose Urine >=1000 (A) Negative mg/dL    Bilirubin Urine Negative Negative    Ketones Urine Negative Negative mg/dL    Specific Gravity Urine 1.015 1.003 - 1.035    Blood Urine Trace (A) Negative    pH Urine 5.5 5.0 - 7.0    Protein Albumin Urine Negative Negative mg/dL    Urobilinogen Urine 0.2 0.2, 1.0 E.U./dL    Nitrite Urine Negative Negative    Leukocyte Esterase Urine Negative Negative   Urine Microscopic     Status: Abnormal   Result Value Ref Range    Bacteria Urine Few (A) None Seen /HPF    RBC Urine 0-2 0-2 /HPF /HPF    WBC Urine 0-5 0-5 /HPF /HPF    Squamous Epithelials Urine Few (A) None Seen /LPF    Narrative    Urine Culture not indicated

## 2022-03-18 NOTE — PATIENT INSTRUCTIONS
The inflammation and redness of the foreskin is likely due to an infection.  We are treating for both bacterial and yeast infection with 2 medications.  You have been prescribed Augmentin, an antibiotic as well as fluconazole, and oral antifungal medication.      We are checking additional lab work today for further evaluation of possible diabetes given the glucose level in your urine sample.  I will send results and next steps to [email or web address].  Please continue with your follow-up with [facility] in 2 weeks for recheck.  Reach out with any questions or concerns.  Return to clinic for new or worsening symptoms.      Patient Education     Balanitis   Balanitis is an inflammation of the head of the penis. It can happen if there is a buildup of germs under the foreskin. These germs could be bacteria, viruses, or fungi. It can also occur from exposure to soaps and other chemicals. In adults, this is most often a complication of diabetes. It can also be due to obesity or poor genital cleaning habits.  If not treated right away, this can lead to a condition called phimosis. This means you can't pull back the foreskin from the head of the penis.  Symptoms of balanitis may include:    Penis pain or soreness    Discharge    Inability to retract the foreskin    Trouble urinating    Inability to get an erection (erectile dysfunction or impotence)  Home care  The following guidelines will help you care for your condition at home:    If you can retract your foreskin:  ? Children. Retract the foreskin and clean with water. Put antibiotic cream or ointment on the penis 3 times a day.  ? Adults. Retract the foreskin and clean with water. Apply clotrimazole cream to the penis 3 times a day unless another medicine was prescribed. You can buy this cream over the counter. Don't have sex while being treated.  ? Sitz baths. Soak the penis and foreskin in warm water while there is inflammation.    If you have diabetes, talk with your healthcare  provider about keeping your diabetes in good control.    If you are overweight, talk with your provider about a weight-loss plan.  Follow-up care  Follow up with your healthcare provider, or as advised.  When to get medical advice  Call your healthcare provider right away if any of these occur:    You can't return the retracted foreskin to the forward position (get medical care right away)    You can't retract the foreskin    Your symptoms get worse    Urine flow is partly or fully blocked  Jaz last reviewed this educational content on 10/1/2019    [id number] [employer]. All rights reserved. This information is not intended as a substitute for professional medical care. Always follow your healthcare professional's instructions.

## 2022-03-21 ENCOUNTER — APPOINTMENT (OUTPATIENT)
Dept: CT IMAGING | Facility: HOSPITAL | Age: 62
End: 2022-03-21
Attending: EMERGENCY MEDICINE
Payer: COMMERCIAL

## 2022-03-21 ENCOUNTER — APPOINTMENT (OUTPATIENT)
Dept: RADIOLOGY | Facility: HOSPITAL | Age: 62
End: 2022-03-21
Attending: EMERGENCY MEDICINE
Payer: COMMERCIAL

## 2022-03-21 ENCOUNTER — HOSPITAL ENCOUNTER (INPATIENT)
Facility: HOSPITAL | Age: 62
LOS: 1 days | Discharge: HOME | End: 2022-03-24
Attending: EMERGENCY MEDICINE | Admitting: HOSPITALIST
Payer: COMMERCIAL

## 2022-03-21 ENCOUNTER — APPOINTMENT (OUTPATIENT)
Dept: ULTRASOUND IMAGING | Facility: HOSPITAL | Age: 62
End: 2022-03-21
Attending: EMERGENCY MEDICINE
Payer: COMMERCIAL

## 2022-03-21 ENCOUNTER — HOSPITAL ENCOUNTER (EMERGENCY)
Facility: HOSPITAL | Age: 62
Discharge: HOME | End: 2022-03-21
Attending: EMERGENCY MEDICINE | Admitting: EMERGENCY MEDICINE
Payer: COMMERCIAL

## 2022-03-21 VITALS
BODY MASS INDEX: 28.87 KG/M2 | TEMPERATURE: 97.8 F | HEART RATE: 85 BPM | RESPIRATION RATE: 18 BRPM | DIASTOLIC BLOOD PRESSURE: 87 MMHG | WEIGHT: 207 LBS | OXYGEN SATURATION: 97 % | SYSTOLIC BLOOD PRESSURE: 137 MMHG

## 2022-03-21 DIAGNOSIS — K82.8 GALLBLADDER SLUDGE: ICD-10-CM

## 2022-03-21 DIAGNOSIS — K81.0 ACUTE CHOLECYSTITIS: ICD-10-CM

## 2022-03-21 DIAGNOSIS — R11.0 NAUSEA: ICD-10-CM

## 2022-03-21 DIAGNOSIS — R79.89 ABNORMAL LFTS: ICD-10-CM

## 2022-03-21 DIAGNOSIS — R74.8 ELEVATED LIVER ENZYMES: Primary | ICD-10-CM

## 2022-03-21 DIAGNOSIS — K29.70 GASTRITIS WITHOUT BLEEDING, UNSPECIFIED CHRONICITY, UNSPECIFIED GASTRITIS TYPE: ICD-10-CM

## 2022-03-21 DIAGNOSIS — E11.9 TYPE 2 DIABETES MELLITUS WITHOUT COMPLICATION, WITHOUT LONG-TERM CURRENT USE OF INSULIN (H): ICD-10-CM

## 2022-03-21 DIAGNOSIS — R10.13 EPIGASTRIC PAIN: ICD-10-CM

## 2022-03-21 DIAGNOSIS — R17 ELEVATED BILIRUBIN: ICD-10-CM

## 2022-03-21 LAB
ALBUMIN SERPL-MCNC: 4.2 G/DL (ref 3.5–5)
ALBUMIN SERPL-MCNC: 4.3 G/DL (ref 3.5–5)
ALP SERPL-CCNC: 120 U/L (ref 45–120)
ALP SERPL-CCNC: 89 U/L (ref 45–120)
ALT SERPL W P-5'-P-CCNC: 131 U/L (ref 0–45)
ALT SERPL W P-5'-P-CCNC: 306 U/L (ref 0–45)
ANION GAP SERPL CALCULATED.3IONS-SCNC: 12 MMOL/L (ref 5–18)
AST SERPL W P-5'-P-CCNC: 135 U/L (ref 0–40)
AST SERPL W P-5'-P-CCNC: 336 U/L (ref 0–40)
ATRIAL RATE - MUSE: 84 BPM
BILIRUB DIRECT SERPL-MCNC: 0.6 MG/DL
BILIRUB DIRECT SERPL-MCNC: 1.1 MG/DL
BILIRUB SERPL-MCNC: 2 MG/DL (ref 0–1)
BILIRUB SERPL-MCNC: 3.4 MG/DL (ref 0–1)
BUN SERPL-MCNC: 12 MG/DL (ref 8–22)
CALCIUM SERPL-MCNC: 9.6 MG/DL (ref 8.5–10.5)
CHLORIDE BLD-SCNC: 100 MMOL/L (ref 98–107)
CO2 SERPL-SCNC: 23 MMOL/L (ref 22–31)
CREAT SERPL-MCNC: 1.17 MG/DL (ref 0.7–1.3)
DIASTOLIC BLOOD PRESSURE - MUSE: NORMAL MMHG
ERYTHROCYTE [DISTWIDTH] IN BLOOD BY AUTOMATED COUNT: 17.5 % (ref 10–15)
ERYTHROCYTE [DISTWIDTH] IN BLOOD BY AUTOMATED COUNT: 17.7 % (ref 10–15)
GFR SERPL CREATININE-BSD FRML MDRD: 70 ML/MIN/1.73M2
GLUCOSE BLD-MCNC: 260 MG/DL (ref 70–125)
HCT VFR BLD AUTO: 41 % (ref 40–53)
HCT VFR BLD AUTO: 42.6 % (ref 40–53)
HGB BLD-MCNC: 12.1 G/DL (ref 13.3–17.7)
HGB BLD-MCNC: 12.7 G/DL (ref 13.3–17.7)
HOLD SPECIMEN: NORMAL
INTERPRETATION ECG - MUSE: NORMAL
LIPASE SERPL-CCNC: 51 U/L (ref 0–52)
MCH RBC QN AUTO: 18.5 PG (ref 26.5–33)
MCH RBC QN AUTO: 18.6 PG (ref 26.5–33)
MCHC RBC AUTO-ENTMCNC: 29.5 G/DL (ref 31.5–36.5)
MCHC RBC AUTO-ENTMCNC: 29.8 G/DL (ref 31.5–36.5)
MCV RBC AUTO: 62 FL (ref 78–100)
MCV RBC AUTO: 63 FL (ref 78–100)
P AXIS - MUSE: 48 DEGREES
PLATELET # BLD AUTO: 251 10E3/UL (ref 150–450)
PLATELET # BLD AUTO: 256 10E3/UL (ref 150–450)
POTASSIUM BLD-SCNC: 4.3 MMOL/L (ref 3.5–5)
PR INTERVAL - MUSE: 148 MS
PROT SERPL-MCNC: 7.5 G/DL (ref 6–8)
PROT SERPL-MCNC: 8 G/DL (ref 6–8)
QRS DURATION - MUSE: 102 MS
QT - MUSE: 380 MS
QTC - MUSE: 449 MS
R AXIS - MUSE: 20 DEGREES
RBC # BLD AUTO: 6.55 10E6/UL (ref 4.4–5.9)
RBC # BLD AUTO: 6.83 10E6/UL (ref 4.4–5.9)
SODIUM SERPL-SCNC: 135 MMOL/L (ref 136–145)
SYSTOLIC BLOOD PRESSURE - MUSE: NORMAL MMHG
T AXIS - MUSE: 66 DEGREES
TROPONIN I SERPL-MCNC: <0.01 NG/ML (ref 0–0.29)
VENTRICULAR RATE- MUSE: 84 BPM
WBC # BLD AUTO: 10 10E3/UL (ref 4–11)
WBC # BLD AUTO: 7 10E3/UL (ref 4–11)

## 2022-03-21 PROCEDURE — 93005 ELECTROCARDIOGRAM TRACING: CPT | Performed by: EMERGENCY MEDICINE

## 2022-03-21 PROCEDURE — 84484 ASSAY OF TROPONIN QUANT: CPT | Performed by: EMERGENCY MEDICINE

## 2022-03-21 PROCEDURE — 96375 TX/PRO/DX INJ NEW DRUG ADDON: CPT

## 2022-03-21 PROCEDURE — 85027 COMPLETE CBC AUTOMATED: CPT | Performed by: EMERGENCY MEDICINE

## 2022-03-21 PROCEDURE — 74177 CT ABD & PELVIS W/CONTRAST: CPT

## 2022-03-21 PROCEDURE — 83690 ASSAY OF LIPASE: CPT | Performed by: EMERGENCY MEDICINE

## 2022-03-21 PROCEDURE — 36415 COLL VENOUS BLD VENIPUNCTURE: CPT | Performed by: EMERGENCY MEDICINE

## 2022-03-21 PROCEDURE — 93005 ELECTROCARDIOGRAM TRACING: CPT | Performed by: STUDENT IN AN ORGANIZED HEALTH CARE EDUCATION/TRAINING PROGRAM

## 2022-03-21 PROCEDURE — 99285 EMERGENCY DEPT VISIT HI MDM: CPT | Mod: 25

## 2022-03-21 PROCEDURE — 250N000013 HC RX MED GY IP 250 OP 250 PS 637: Performed by: EMERGENCY MEDICINE

## 2022-03-21 PROCEDURE — 250N000011 HC RX IP 250 OP 636: Performed by: EMERGENCY MEDICINE

## 2022-03-21 PROCEDURE — 82248 BILIRUBIN DIRECT: CPT | Performed by: EMERGENCY MEDICINE

## 2022-03-21 PROCEDURE — 76705 ECHO EXAM OF ABDOMEN: CPT

## 2022-03-21 PROCEDURE — 71045 X-RAY EXAM CHEST 1 VIEW: CPT

## 2022-03-21 PROCEDURE — 250N000009 HC RX 250: Performed by: EMERGENCY MEDICINE

## 2022-03-21 RX ORDER — SODIUM CHLORIDE 9 MG/ML
INJECTION, SOLUTION INTRAVENOUS ONCE
Status: COMPLETED | OUTPATIENT
Start: 2022-03-22 | End: 2022-03-22

## 2022-03-21 RX ORDER — IOPAMIDOL 755 MG/ML
100 INJECTION, SOLUTION INTRAVASCULAR ONCE
Status: COMPLETED | OUTPATIENT
Start: 2022-03-21 | End: 2022-03-21

## 2022-03-21 RX ORDER — PIPERACILLIN SODIUM, TAZOBACTAM SODIUM 3; .375 G/15ML; G/15ML
3.38 INJECTION, POWDER, LYOPHILIZED, FOR SOLUTION INTRAVENOUS ONCE
Status: COMPLETED | OUTPATIENT
Start: 2022-03-22 | End: 2022-03-22

## 2022-03-21 RX ORDER — ONDANSETRON 2 MG/ML
8 INJECTION INTRAMUSCULAR; INTRAVENOUS ONCE
Status: COMPLETED | OUTPATIENT
Start: 2022-03-21 | End: 2022-03-21

## 2022-03-21 RX ADMIN — LIDOCAINE HYDROCHLORIDE 30 ML: 20 SOLUTION ORAL; TOPICAL at 07:51

## 2022-03-21 RX ADMIN — ONDANSETRON 8 MG: 2 INJECTION INTRAMUSCULAR; INTRAVENOUS at 21:36

## 2022-03-21 RX ADMIN — IOPAMIDOL 100 ML: 755 INJECTION, SOLUTION INTRAVENOUS at 11:34

## 2022-03-21 ASSESSMENT — ENCOUNTER SYMPTOMS: SHORTNESS OF BREATH: 0

## 2022-03-21 NOTE — ED PROVIDER NOTES
EMERGENCY DEPARTMENT ENCOUNTER      NAME: Farhat Tejada  AGE: 61 year old male  YOB: 1960  MRN: [id number]  EVALUATION DATE & TIME: 3/21/2022  7:27 AM    PCP: Helena Magaña    ED PROVIDER: Messi Sauer M.D.      Chief Complaint   Patient presents with     Chest Pain         FINAL IMPRESSION:  1. Epigastric pain    2. Elevated bilirubin    3. Gallbladder sludge    4. Gastritis without bleeding, unspecified chronicity, unspecified gastritis type          ED COURSE & MEDICAL DECISION MAKING:    Pertinent Labs & Imaging studies reviewed. (See chart for details)  ED Course as of 03/21/22 1500   Mon Mar 21, 2022   0744 Patient is a 61-year-old gentleman who presents with chest discomfort and fast 6 hours, worse over the past hour.  Initially felt like gas pain but he felt diaphoretic recently.  He also was recently diagnosed with diabetes as well as balanitis and started Metformin and Augmentin 2 days ago.  On exam he is relatively comfortable, his pain is 2 out of 10.  Initial EKG does not show signs of ischemia.  Plan rule out ACS, but also treat for possible gastritis due to some of the medications he is on.  GI cocktail ordered.  Will remain on telemetry.   0745 EKG shows sinus rhythm with a rate of 84.  No acute ischemic ST or T wave morphology.  Normal axis, normal intervals.  When compared to prior EKG on June 19, 2017, there is no significant change.  Impression: Normal sinus rhythm, no evidence of acute ischemia   0846 WBC: 7.0   0847 XR Chest Port 1 View  Lungs are clear. Heart and pulmonary vascularity are normal. No signs of acute disease.   1033 Creatinine: 1.17   1033 Troponin I: <0.01     On reassessment, the patient notes that he does have abnormally elevated bilirubin.  Unclear if his symptoms are due to some intermittent biliary colic, or if this is gastritis from his Augmentin and Metformin.  Plan to have him start omeprazole, follow-up with primary care provider.  Troponin was  "drawn greater than 6 hours after chest discomfort began, very unlikely to be ACS.  Patient was instructed to follow low-fat diet.    Additional ED Course Timestamps:  7:37 AM I met with the patient to gather history and to perform my initial exam. I discussed the plan for care while in the Emergency Department. PPE (gloves, face shield, N95 mask) was worn by me during patient encounters while patient wore mask.   8:51 AM I rechecked the patient and updated on results.   12:18 PM I rechecked the patient and updated them on results.     At the conclusion of the encounter I discussed the results of all of the tests and the disposition. The questions were answered. The patient or family acknowledged understanding and was agreeable with the care plan.       MEDICATIONS GIVEN IN THE EMERGENCY:  Medications   lidocaine (viscous) (XYLOCAINE) 2 % 15 mL, alum & mag hydroxide-simethicone (MAALOX) 15 mL GI Cocktail (30 mLs Oral Given 3/21/22 1901)   iopamidol (ISOVUE-370) solution 100 mL (100 mLs Intravenous Given 3/21/22 8187)         NEW PRESCRIPTIONS STARTED AT TODAY'S ER VISIT  Discharge Medication List as of 3/21/2022 12:52 PM      START taking these medications    Details   omeprazole (PRILOSEC) 20 MG DR capsule Take 1 capsule (20 mg) by mouth daily, Disp-30 capsule, R-0, E-Prescribe                =================================================================    HPI    Patient information was obtained from: The patient and patient's wife    Use of : N/A        Farhat Tejada is a 61 year old male with a pertinent history of DM II, gastroesophageal reflux disease without esophagitis, and hyperlipidemia who presents to this ED for evaluation of chest pain.     The patient reports chest pain that began around 1:30 AM (about 7 hours ago) today. He reports going to bed feeling well, but was woken up throughout the night due to pain. He reports that he felt \"gassy\" and the pain was improved as he passed gas, but " "the pain continued to come back. He also began to feel clammy around 6:30 AM. Currently he rates the pain as a 2/10, but says it was it's worst around 6:30 AM and would've rated that as a 5-6/10. He also has not eaten a meal today.    The patient was seen in office on Friday (3 days ago) and was given one dose of diflucan and started on Augmentin for a skin infection. Labwork showed his A1C was 11, and patient was started on Metformin for DM on Saturday (2 days ago).     Patient reports a history of GERD and states his chest pain did feel similar to this. He denies a personal history of cardiac disease but does note a family history of early cardiac disease with his father.     Patient denies shortness of breath, leg swelling or additional medical concerns or complaints at this time.        REVIEW OF SYSTEMS   Review of Systems   Constitutional:        Positive for clamminess    Respiratory: Negative for shortness of breath.    Cardiovascular: Positive for chest pain. Negative for leg swelling.   Gastrointestinal:        Positive for \"gassiness\"   All other systems reviewed and are negative.      PAST MEDICAL HISTORY:  History reviewed. No pertinent past medical history.    PAST SURGICAL HISTORY:  Past Surgical History:   Procedure Laterality Date     VASECTOMY       [facility]             CURRENT MEDICATIONS:    No current facility-administered medications for this encounter.     Current Outpatient Medications   Medication     omeprazole (PRILOSEC) 20 MG DR capsule     amoxicillin-clavulanate (AUGMENTIN) 875-125 MG tablet     metFORMIN (GLUCOPHAGE-XR) 500 MG 24 hr tablet       ALLERGIES:  No Known Allergies    FAMILY HISTORY:  Family History   Problem Relation Age of Onset     Hypertension Mother      Heart Failure Mother 36     Diabetes Father      Hypertension Father      Cerebrovascular Disease Father 88     Thyroid Cancer Brother      Diabetes Paternal Aunt        SOCIAL HISTORY:   Social History "     Socioeconomic History     Marital status:      Spouse name: None     Number of children: None     Years of education: None     Highest education level: None   Occupational History     None   Tobacco Use     Smoking status: Never Smoker     Smokeless tobacco: Never Used   Substance and Sexual Activity     Alcohol use: Yes     Drug use: No     Sexual activity: Yes     Partners: Female   Other Topics Concern     Parent/sibling w/ CABG, MI or angioplasty before 65F 55M? Yes     Comment: mother  at age 36 from heart condition    Social History Narrative     None     Social Determinants of Health     Financial Resource Strain: Not on file   Food Insecurity: Not on file   Transportation Needs: Not on file   Physical Activity: Not on file   Stress: Not on file   Social Connections: Not on file   Intimate Partner Violence: Not on file   Housing Stability: Not on file       VITALS:  BP (!) (P) 144/92   Pulse 85   Temp 97.8  F (36.6  C) (Temporal)   Resp 18   Wt 93.9 kg (207 lb)   SpO2 97%   BMI 28.87 kg/m      PHYSICAL EXAM    Constitutional: Well developed, well nourished. Comfortable appearing.  HENT: Normocephalic, atraumatic, mucous membranes moist, nose normal. Neck- Supple, gross ROM intact.   Eyes: Pupils mid-range, conjunctiva without injection, no discharge.   Respiratory: Clear to auscultation bilaterally, no respiratory distress, no wheezing, speaks full sentences easily. No cough.  Cardiovascular: Normal heart rate, regular rhythm, no murmurs. No pedal edema, 2+ DP pulses.   GI: Soft, no tenderness to deep palpation in all quadrants, no masses.  Musculoskeletal: Moving all 4 extremities intentionally and without pain. No obvious deformity.  Skin: Warm, dry, no rash.  Neurologic: Alert & oriented x 3, cranial nerves grossly intact.  Psychiatric: Affect normal, cooperative.       LAB:  All pertinent labs reviewed and interpreted.  Labs Ordered and Resulted from Time of ED Arrival to Time of  ED Departure   CBC WITH PLATELETS - Abnormal       Result Value    WBC Count 7.0      RBC Count 6.55 (*)     Hemoglobin 12.1 (*)     Hematocrit 41.0      MCV 63 (*)     MCH 18.5 (*)     MCHC 29.5 (*)     RDW 17.5 (*)     Platelet Count 251     BASIC METABOLIC PANEL - Abnormal    Sodium 135 (*)     Potassium 4.3      Chloride 100      Carbon Dioxide (CO2) 23      Anion Gap 12      Urea Nitrogen 12      Creatinine 1.17      Calcium 9.6      Glucose 260 (*)     GFR Estimate 70     HEPATIC FUNCTION PANEL - Abnormal    Bilirubin Total 2.0 (*)     Bilirubin Direct 0.6 (*)     Protein Total 7.5      Albumin 4.2      Alkaline Phosphatase 89       (*)      (*)    TROPONIN I - Normal    Troponin I <0.01     LIPASE - Normal    Lipase 51         RADIOLOGY:  Reviewed all pertinent imaging. Please see official radiology report.  CT Abdomen Pelvis w Contrast   Final Result   IMPRESSION:    1.  Debris in the gallbladder. No bile duct dilatation.   2.  No pancreatic duct dilatation.   3.  Hepatic steatosis.   4.  A subcentimeter enhancing hepatic focus is indeterminate. This is likely a benign vascular lesion. No follow-up required.       XR Chest Port 1 View   Final Result   IMPRESSION: Lungs are clear. Heart and pulmonary vascularity are normal. No signs of acute disease.          EKG:    All EKG interpretations will be found in ED course above.      I, Davina Taylor am serving as a scribe to document services personally performed by Dr. Messi Saure based on my observation and the provider's statements to me. I, Messi Sauer MD attest that Davina Taylor is acting in a scribe capacity, has observed my performance of the services and has documented them in accordance with my direction.    Messi Sauer M.D.  Emergency Medicine  [facility] EMERGENCY DEPARTMENT  [address] [facility] [id number] [phone]  Dept: [phone]      Messi Sauer MD  03/21/22 4607

## 2022-03-21 NOTE — ED TRIAGE NOTES
Patient with substernal chest pain that kept him up all night. Patient states that he felt like it was a build up of gas, and he would pass gas and the pain would subside for a short period of time. This AM when he got up the pain was worse and he felt a little clammy. Patient also feels like his heart is beating a little fast.

## 2022-03-21 NOTE — DISCHARGE INSTRUCTIONS
Please start taking the omeprazole daily.    Follow up with our primary care doctor next week. If your pain becomes much worse, you have vomiting or a fever, please come back - your gallbladder may be infected.

## 2022-03-22 ENCOUNTER — ANESTHESIA (OUTPATIENT)
Dept: SURGERY | Facility: HOSPITAL | Age: 62
End: 2022-03-22
Payer: COMMERCIAL

## 2022-03-22 ENCOUNTER — APPOINTMENT (OUTPATIENT)
Dept: RADIOLOGY | Facility: HOSPITAL | Age: 62
End: 2022-03-22
Attending: INTERNAL MEDICINE
Payer: COMMERCIAL

## 2022-03-22 ENCOUNTER — ANESTHESIA EVENT (OUTPATIENT)
Dept: SURGERY | Facility: HOSPITAL | Age: 62
End: 2022-03-22
Payer: COMMERCIAL

## 2022-03-22 LAB
ERCP: NORMAL
ERCP: NORMAL
GLUCOSE BLDC GLUCOMTR-MCNC: 193 MG/DL (ref 70–99)
GLUCOSE BLDC GLUCOMTR-MCNC: 221 MG/DL (ref 70–99)
GLUCOSE BLDC GLUCOMTR-MCNC: 229 MG/DL (ref 70–99)
GLUCOSE BLDC GLUCOMTR-MCNC: 238 MG/DL (ref 70–99)
GLUCOSE BLDC GLUCOMTR-MCNC: 244 MG/DL (ref 70–99)
SARS-COV-2 RNA RESP QL NAA+PROBE: NEGATIVE
UPPER EUS: NORMAL

## 2022-03-22 PROCEDURE — C1769 GUIDE WIRE: HCPCS | Performed by: INTERNAL MEDICINE

## 2022-03-22 PROCEDURE — 96372 THER/PROPH/DIAG INJ SC/IM: CPT | Performed by: STUDENT IN AN ORGANIZED HEALTH CARE EDUCATION/TRAINING PROGRAM

## 2022-03-22 PROCEDURE — 250N000009 HC RX 250: Performed by: NURSE ANESTHETIST, CERTIFIED REGISTERED

## 2022-03-22 PROCEDURE — 250N000013 HC RX MED GY IP 250 OP 250 PS 637: Performed by: HOSPITALIST

## 2022-03-22 PROCEDURE — 258N000003 HC RX IP 258 OP 636: Performed by: EMERGENCY MEDICINE

## 2022-03-22 PROCEDURE — 250N000011 HC RX IP 250 OP 636: Performed by: NURSE ANESTHETIST, CERTIFIED REGISTERED

## 2022-03-22 PROCEDURE — 370N000017 HC ANESTHESIA TECHNICAL FEE, PER MIN: Performed by: INTERNAL MEDICINE

## 2022-03-22 PROCEDURE — 36415 COLL VENOUS BLD VENIPUNCTURE: CPT | Performed by: INTERNAL MEDICINE

## 2022-03-22 PROCEDURE — 0FC98ZZ EXTIRPATION OF MATTER FROM COMMON BILE DUCT, VIA NATURAL OR ARTIFICIAL OPENING ENDOSCOPIC: ICD-10-PCS | Performed by: INTERNAL MEDICINE

## 2022-03-22 PROCEDURE — 82962 GLUCOSE BLOOD TEST: CPT

## 2022-03-22 PROCEDURE — 250N000025 HC SEVOFLURANE, PER MIN: Performed by: INTERNAL MEDICINE

## 2022-03-22 PROCEDURE — 360N000083 HC SURGERY LEVEL 3 W/ FLUORO, PER MIN: Performed by: INTERNAL MEDICINE

## 2022-03-22 PROCEDURE — 96376 TX/PRO/DX INJ SAME DRUG ADON: CPT

## 2022-03-22 PROCEDURE — 258N000003 HC RX IP 258 OP 636: Performed by: STUDENT IN AN ORGANIZED HEALTH CARE EDUCATION/TRAINING PROGRAM

## 2022-03-22 PROCEDURE — 99219 PR INITIAL OBSERVATION CARE,LEVEL II: CPT | Performed by: STUDENT IN AN ORGANIZED HEALTH CARE EDUCATION/TRAINING PROGRAM

## 2022-03-22 PROCEDURE — 999N000180 XR SURGERY CARM FLUORO LESS THAN 5 MIN

## 2022-03-22 PROCEDURE — 250N000011 HC RX IP 250 OP 636: Performed by: INTERNAL MEDICINE

## 2022-03-22 PROCEDURE — 250N000011 HC RX IP 250 OP 636: Performed by: EMERGENCY MEDICINE

## 2022-03-22 PROCEDURE — 250N000012 HC RX MED GY IP 250 OP 636 PS 637: Performed by: STUDENT IN AN ORGANIZED HEALTH CARE EDUCATION/TRAINING PROGRAM

## 2022-03-22 PROCEDURE — C9803 HOPD COVID-19 SPEC COLLECT: HCPCS

## 2022-03-22 PROCEDURE — 96366 THER/PROPH/DIAG IV INF ADDON: CPT

## 2022-03-22 PROCEDURE — 96365 THER/PROPH/DIAG IV INF INIT: CPT

## 2022-03-22 PROCEDURE — 710N000009 HC RECOVERY PHASE 1, LEVEL 1, PER MIN: Performed by: INTERNAL MEDICINE

## 2022-03-22 PROCEDURE — 258N000003 HC RX IP 258 OP 636: Performed by: ANESTHESIOLOGY

## 2022-03-22 PROCEDURE — G0378 HOSPITAL OBSERVATION PER HR: HCPCS

## 2022-03-22 PROCEDURE — 258N000003 HC RX IP 258 OP 636: Performed by: INTERNAL MEDICINE

## 2022-03-22 PROCEDURE — 87635 SARS-COV-2 COVID-19 AMP PRB: CPT | Performed by: EMERGENCY MEDICINE

## 2022-03-22 PROCEDURE — 999N000141 HC STATISTIC PRE-PROCEDURE NURSING ASSESSMENT: Performed by: INTERNAL MEDICINE

## 2022-03-22 PROCEDURE — 99222 1ST HOSP IP/OBS MODERATE 55: CPT | Mod: 57 | Performed by: SURGERY

## 2022-03-22 PROCEDURE — 80074 ACUTE HEPATITIS PANEL: CPT | Performed by: INTERNAL MEDICINE

## 2022-03-22 PROCEDURE — 250N000011 HC RX IP 250 OP 636: Performed by: STUDENT IN AN ORGANIZED HEALTH CARE EDUCATION/TRAINING PROGRAM

## 2022-03-22 PROCEDURE — 272N000001 HC OR GENERAL SUPPLY STERILE: Performed by: INTERNAL MEDICINE

## 2022-03-22 PROCEDURE — C1726 CATH, BAL DIL, NON-VASCULAR: HCPCS | Performed by: INTERNAL MEDICINE

## 2022-03-22 PROCEDURE — 87799 DETECT AGENT NOS DNA QUANT: CPT | Performed by: INTERNAL MEDICINE

## 2022-03-22 PROCEDURE — 258N000003 HC RX IP 258 OP 636: Performed by: NURSE ANESTHETIST, CERTIFIED REGISTERED

## 2022-03-22 RX ORDER — FENTANYL CITRATE 50 UG/ML
INJECTION, SOLUTION INTRAMUSCULAR; INTRAVENOUS PRN
Status: DISCONTINUED | OUTPATIENT
Start: 2022-03-22 | End: 2022-03-22

## 2022-03-22 RX ORDER — ONDANSETRON 4 MG/1
4 TABLET, ORALLY DISINTEGRATING ORAL EVERY 30 MIN PRN
Status: DISCONTINUED | OUTPATIENT
Start: 2022-03-22 | End: 2022-03-22 | Stop reason: HOSPADM

## 2022-03-22 RX ORDER — NALOXONE HYDROCHLORIDE 0.4 MG/ML
0.4 INJECTION, SOLUTION INTRAMUSCULAR; INTRAVENOUS; SUBCUTANEOUS
Status: DISCONTINUED | OUTPATIENT
Start: 2022-03-22 | End: 2022-03-24 | Stop reason: HOSPADM

## 2022-03-22 RX ORDER — NALOXONE HYDROCHLORIDE 0.4 MG/ML
0.2 INJECTION, SOLUTION INTRAMUSCULAR; INTRAVENOUS; SUBCUTANEOUS
Status: DISCONTINUED | OUTPATIENT
Start: 2022-03-22 | End: 2022-03-24 | Stop reason: HOSPADM

## 2022-03-22 RX ORDER — ONDANSETRON 2 MG/ML
4 INJECTION INTRAMUSCULAR; INTRAVENOUS EVERY 6 HOURS PRN
Status: DISCONTINUED | OUTPATIENT
Start: 2022-03-22 | End: 2022-03-24 | Stop reason: HOSPADM

## 2022-03-22 RX ORDER — NALOXONE HYDROCHLORIDE 0.4 MG/ML
0.4 INJECTION, SOLUTION INTRAMUSCULAR; INTRAVENOUS; SUBCUTANEOUS
Status: DISCONTINUED | OUTPATIENT
Start: 2022-03-22 | End: 2022-03-22

## 2022-03-22 RX ORDER — PIPERACILLIN SODIUM, TAZOBACTAM SODIUM 3; .375 G/15ML; G/15ML
3.38 INJECTION, POWDER, LYOPHILIZED, FOR SOLUTION INTRAVENOUS EVERY 8 HOURS
Status: DISCONTINUED | OUTPATIENT
Start: 2022-03-22 | End: 2022-03-24 | Stop reason: HOSPADM

## 2022-03-22 RX ORDER — FENTANYL CITRATE 50 UG/ML
25 INJECTION, SOLUTION INTRAMUSCULAR; INTRAVENOUS
Status: DISCONTINUED | OUTPATIENT
Start: 2022-03-22 | End: 2022-03-22 | Stop reason: HOSPADM

## 2022-03-22 RX ORDER — PROPOFOL 10 MG/ML
INJECTION, EMULSION INTRAVENOUS PRN
Status: DISCONTINUED | OUTPATIENT
Start: 2022-03-22 | End: 2022-03-22

## 2022-03-22 RX ORDER — ONDANSETRON 2 MG/ML
INJECTION INTRAMUSCULAR; INTRAVENOUS PRN
Status: DISCONTINUED | OUTPATIENT
Start: 2022-03-22 | End: 2022-03-22

## 2022-03-22 RX ORDER — ONDANSETRON 4 MG/1
4 TABLET, ORALLY DISINTEGRATING ORAL EVERY 6 HOURS PRN
Status: DISCONTINUED | OUTPATIENT
Start: 2022-03-22 | End: 2022-03-24 | Stop reason: HOSPADM

## 2022-03-22 RX ORDER — DEXTROSE MONOHYDRATE 25 G/50ML
25-50 INJECTION, SOLUTION INTRAVENOUS
Status: DISCONTINUED | OUTPATIENT
Start: 2022-03-22 | End: 2022-03-24 | Stop reason: HOSPADM

## 2022-03-22 RX ORDER — LABETALOL HYDROCHLORIDE 5 MG/ML
5 INJECTION, SOLUTION INTRAVENOUS EVERY 10 MIN PRN
Status: DISCONTINUED | OUTPATIENT
Start: 2022-03-22 | End: 2022-03-22 | Stop reason: HOSPADM

## 2022-03-22 RX ORDER — SODIUM CHLORIDE, SODIUM LACTATE, POTASSIUM CHLORIDE, CALCIUM CHLORIDE 600; 310; 30; 20 MG/100ML; MG/100ML; MG/100ML; MG/100ML
INJECTION, SOLUTION INTRAVENOUS CONTINUOUS
Status: DISCONTINUED | OUTPATIENT
Start: 2022-03-22 | End: 2022-03-22 | Stop reason: HOSPADM

## 2022-03-22 RX ORDER — ACETAMINOPHEN 325 MG/1
975 TABLET ORAL ONCE
Status: COMPLETED | OUTPATIENT
Start: 2022-03-22 | End: 2022-03-22

## 2022-03-22 RX ORDER — FENTANYL CITRATE 50 UG/ML
25 INJECTION, SOLUTION INTRAMUSCULAR; INTRAVENOUS EVERY 5 MIN PRN
Status: DISCONTINUED | OUTPATIENT
Start: 2022-03-22 | End: 2022-03-22 | Stop reason: HOSPADM

## 2022-03-22 RX ORDER — GLYCOPYRROLATE 0.2 MG/ML
INJECTION, SOLUTION INTRAMUSCULAR; INTRAVENOUS PRN
Status: DISCONTINUED | OUTPATIENT
Start: 2022-03-22 | End: 2022-03-22

## 2022-03-22 RX ORDER — SODIUM CHLORIDE, SODIUM LACTATE, POTASSIUM CHLORIDE, CALCIUM CHLORIDE 600; 310; 30; 20 MG/100ML; MG/100ML; MG/100ML; MG/100ML
INJECTION, SOLUTION INTRAVENOUS CONTINUOUS
Status: DISCONTINUED | OUTPATIENT
Start: 2022-03-22 | End: 2022-03-22

## 2022-03-22 RX ORDER — NALOXONE HYDROCHLORIDE 1 MG/ML
0.4 INJECTION INTRAMUSCULAR; INTRAVENOUS; SUBCUTANEOUS
Status: DISCONTINUED | OUTPATIENT
Start: 2022-03-22 | End: 2022-03-22

## 2022-03-22 RX ORDER — FLUMAZENIL 0.1 MG/ML
0.2 INJECTION, SOLUTION INTRAVENOUS
Status: ACTIVE | OUTPATIENT
Start: 2022-03-22 | End: 2022-03-23

## 2022-03-22 RX ORDER — NALOXONE HYDROCHLORIDE 1 MG/ML
0.2 INJECTION INTRAMUSCULAR; INTRAVENOUS; SUBCUTANEOUS
Status: DISCONTINUED | OUTPATIENT
Start: 2022-03-22 | End: 2022-03-22

## 2022-03-22 RX ORDER — HYDROMORPHONE HYDROCHLORIDE 1 MG/ML
0.2 INJECTION, SOLUTION INTRAMUSCULAR; INTRAVENOUS; SUBCUTANEOUS EVERY 5 MIN PRN
Status: DISCONTINUED | OUTPATIENT
Start: 2022-03-22 | End: 2022-03-22 | Stop reason: HOSPADM

## 2022-03-22 RX ORDER — OXYCODONE HYDROCHLORIDE 5 MG/1
5 TABLET ORAL EVERY 4 HOURS PRN
Status: DISCONTINUED | OUTPATIENT
Start: 2022-03-22 | End: 2022-03-22 | Stop reason: HOSPADM

## 2022-03-22 RX ORDER — DEXAMETHASONE SODIUM PHOSPHATE 4 MG/ML
INJECTION, SOLUTION INTRA-ARTICULAR; INTRALESIONAL; INTRAMUSCULAR; INTRAVENOUS; SOFT TISSUE PRN
Status: DISCONTINUED | OUTPATIENT
Start: 2022-03-22 | End: 2022-03-22

## 2022-03-22 RX ORDER — HALOPERIDOL 5 MG/ML
1 INJECTION INTRAMUSCULAR
Status: DISCONTINUED | OUTPATIENT
Start: 2022-03-22 | End: 2022-03-22 | Stop reason: HOSPADM

## 2022-03-22 RX ORDER — ONDANSETRON 2 MG/ML
4 INJECTION INTRAMUSCULAR; INTRAVENOUS EVERY 30 MIN PRN
Status: DISCONTINUED | OUTPATIENT
Start: 2022-03-22 | End: 2022-03-22 | Stop reason: HOSPADM

## 2022-03-22 RX ORDER — KETAMINE HYDROCHLORIDE 50 MG/ML
INJECTION, SOLUTION INTRAMUSCULAR; INTRAVENOUS PRN
Status: DISCONTINUED | OUTPATIENT
Start: 2022-03-22 | End: 2022-03-22

## 2022-03-22 RX ORDER — SODIUM CHLORIDE 9 MG/ML
INJECTION, SOLUTION INTRAVENOUS CONTINUOUS
Status: DISCONTINUED | OUTPATIENT
Start: 2022-03-22 | End: 2022-03-24 | Stop reason: HOSPADM

## 2022-03-22 RX ORDER — LIDOCAINE HYDROCHLORIDE 20 MG/ML
INJECTION, SOLUTION INFILTRATION; PERINEURAL PRN
Status: DISCONTINUED | OUTPATIENT
Start: 2022-03-22 | End: 2022-03-22

## 2022-03-22 RX ORDER — LIDOCAINE 40 MG/G
CREAM TOPICAL
Status: DISCONTINUED | OUTPATIENT
Start: 2022-03-22 | End: 2022-03-22

## 2022-03-22 RX ORDER — NICOTINE POLACRILEX 4 MG
15-30 LOZENGE BUCCAL
Status: DISCONTINUED | OUTPATIENT
Start: 2022-03-22 | End: 2022-03-24 | Stop reason: HOSPADM

## 2022-03-22 RX ORDER — LIDOCAINE 40 MG/G
CREAM TOPICAL
Status: DISCONTINUED | OUTPATIENT
Start: 2022-03-22 | End: 2022-03-24 | Stop reason: HOSPADM

## 2022-03-22 RX ADMIN — FENTANYL CITRATE 100 MCG: 50 INJECTION, SOLUTION INTRAMUSCULAR; INTRAVENOUS at 16:17

## 2022-03-22 RX ADMIN — SODIUM CHLORIDE: 9 INJECTION, SOLUTION INTRAVENOUS at 03:33

## 2022-03-22 RX ADMIN — SODIUM CHLORIDE: 9 INJECTION, SOLUTION INTRAVENOUS at 00:13

## 2022-03-22 RX ADMIN — GLYCOPYRROLATE 0.2 MG: 0.2 INJECTION, SOLUTION INTRAMUSCULAR; INTRAVENOUS at 15:28

## 2022-03-22 RX ADMIN — PHENYLEPHRINE HYDROCHLORIDE 50 MCG: 10 INJECTION INTRAVENOUS at 15:51

## 2022-03-22 RX ADMIN — SUGAMMADEX 200 MG: 100 INJECTION, SOLUTION INTRAVENOUS at 16:08

## 2022-03-22 RX ADMIN — PIPERACILLIN AND TAZOBACTAM 3.38 G: 3; .375 INJECTION, POWDER, LYOPHILIZED, FOR SOLUTION INTRAVENOUS at 00:28

## 2022-03-22 RX ADMIN — ONDANSETRON 4 MG: 2 INJECTION INTRAMUSCULAR; INTRAVENOUS at 15:42

## 2022-03-22 RX ADMIN — INSULIN ASPART 2 UNITS: 100 INJECTION, SOLUTION INTRAVENOUS; SUBCUTANEOUS at 03:37

## 2022-03-22 RX ADMIN — SODIUM CHLORIDE: 9 INJECTION, SOLUTION INTRAVENOUS at 11:15

## 2022-03-22 RX ADMIN — MIDAZOLAM 2 MG: 1 INJECTION INTRAMUSCULAR; INTRAVENOUS at 15:25

## 2022-03-22 RX ADMIN — KETAMINE HYDROCHLORIDE 20 MG: 50 INJECTION, SOLUTION INTRAMUSCULAR; INTRAVENOUS at 15:43

## 2022-03-22 RX ADMIN — ROCURONIUM BROMIDE 30 MG: 50 INJECTION, SOLUTION INTRAVENOUS at 15:28

## 2022-03-22 RX ADMIN — ACETAMINOPHEN 975 MG: 325 TABLET ORAL at 10:13

## 2022-03-22 RX ADMIN — PHENYLEPHRINE HYDROCHLORIDE 100 MCG: 10 INJECTION INTRAVENOUS at 15:41

## 2022-03-22 RX ADMIN — PIPERACILLIN SODIUM AND TAZOBACTAM SODIUM 3.38 G: 3; .375 INJECTION, POWDER, LYOPHILIZED, FOR SOLUTION INTRAVENOUS at 10:13

## 2022-03-22 RX ADMIN — PROPOFOL 150 MG: 10 INJECTION, EMULSION INTRAVENOUS at 15:28

## 2022-03-22 RX ADMIN — SODIUM CHLORIDE: 9 INJECTION, SOLUTION INTRAVENOUS at 17:39

## 2022-03-22 RX ADMIN — KETAMINE HYDROCHLORIDE 30 MG: 50 INJECTION, SOLUTION INTRAMUSCULAR; INTRAVENOUS at 15:28

## 2022-03-22 RX ADMIN — FENTANYL CITRATE 100 MCG: 50 INJECTION, SOLUTION INTRAMUSCULAR; INTRAVENOUS at 15:28

## 2022-03-22 RX ADMIN — LIDOCAINE HYDROCHLORIDE 60 MG: 20 INJECTION, SOLUTION INFILTRATION; PERINEURAL at 15:28

## 2022-03-22 RX ADMIN — DEXAMETHASONE SODIUM PHOSPHATE 4 MG: 4 INJECTION, SOLUTION INTRA-ARTICULAR; INTRALESIONAL; INTRAMUSCULAR; INTRAVENOUS; SOFT TISSUE at 15:28

## 2022-03-22 RX ADMIN — PIPERACILLIN SODIUM AND TAZOBACTAM SODIUM 3.38 G: 3; .375 INJECTION, POWDER, LYOPHILIZED, FOR SOLUTION INTRAVENOUS at 19:33

## 2022-03-22 RX ADMIN — SODIUM CHLORIDE, POTASSIUM CHLORIDE, SODIUM LACTATE AND CALCIUM CHLORIDE: 600; 310; 30; 20 INJECTION, SOLUTION INTRAVENOUS at 15:25

## 2022-03-22 ASSESSMENT — ACTIVITIES OF DAILY LIVING (ADL): DEPENDENT_IADLS:: INDEPENDENT

## 2022-03-22 NOTE — ED PROVIDER NOTES
EMERGENCY DEPARTMENT ENCOUNTER      NAME: Farhat Tejada  AGE: 61 year old male  YOB: 1960  MRN: [id number]  EVALUATION DATE & TIME: 3/21/2022 10:05 PM    PCP: Helena Magaña    ED PROVIDER: Alix Dowell MD    Chief Complaint   Patient presents with     Abdominal Pain         FINAL IMPRESSION:  1. Acute cholecystitis    2. Nausea    3. Abnormal LFTs          ED COURSE & MEDICAL DECISION MAKING:    Pertinent Labs & Imaging studies reviewed. (See chart for details)  61 year old male with history of obesity, HLD, recently diagnosed diabetes who presents to the Emergency Department for evaluation of abdominal pain earlier today, with minimally elevated LFTs and gallbladder sludge seen on CT, now with nausea and persistent pain.  Differential includes early cholecystitis, symptomatic cholelithiasis.  Patient recently started on Metformin and can certainly have some abdominal pain as well as nausea vomiting a side effect from this medication.    Patient was given Zofran, repeat LFTs notable for AST of 336 up from 135, ALT is 306 up from 131.  T bili of 3.4 up from 2.0.  Right upper quadrant ultrasound notable for cholelithiasis with mild gallbladder wall thickening.  Negative sonographic Avughan sign.  Case discussed with  general surgery.  Will be made n.p.o., given Zosyn, maintenance IV fluids and admitted for likely cholecystectomy tomorrow.      ED Course as of 03/22/22 0003   Mon Mar 21, 2022   2214 Bilirubin Total(!): 3.4  Up from 2.0   2214 AST(!): 336  Up from 135   2214 ALT(!): 306  Up from 131     10:08 PM I met with the patient for the initial interview and physical examination. Discussed plan for treatment and workup in the ED.    11:50 PM I discussed the case with Dr. Lopez, general surgeon, who recommends admission.   11:56 PM I discussed admission with the patient. Patient is agreeable. All questions answered fully.      At the conclusion of the encounter I discussed the results of all  of the tests and the disposition. The questions were answered. The patient or family acknowledged understanding and was agreeable with the care plan.    CONSULTS:  General surgery    MEDICATIONS GIVEN IN THE EMERGENCY:  Medications   piperacillin-tazobactam (ZOSYN) 3.375 g vial to attach to  mL bag (has no administration in time range)   sodium chloride 0.9% infusion (has no administration in time range)   ondansetron (ZOFRAN) injection 8 mg (8 mg Intravenous Given 3/21/22 2136)       NEW PRESCRIPTIONS STARTED AT TODAY'S ER VISIT  New Prescriptions    No medications on file          =================================================================    HPI    Patient information was obtained from: patient     Use of Intrepreter: N/A        Farhat Tejada is a 61 year old male with pertinent medical history of diabetes mellitus (patient reported), hyperlipidemia, GERD, who presents to the ED via walk-in for evaluation of abdominal pain.     Patient reports onset of abdominal pain which he was evaluated for earlier here today in the ED. Since he was discharged, he developed nausea and his abdominal pain became more sharp. His abdominal pain is intermittent and changes locations throughout his abdomen diffusely. Patient was recently started on Metformin, as well as amoxicillin and an antifungal (fluconazole?) for an infection to his penis. He started all of these medications on 3/18 (3 days ago). Patient's CT scan when he was here earlier showed sludge in the gallbladder but no stones. Denies any vomiting but endorses dry heaving secondary to not eating since lunch time. Denies any other symptoms or complaints at this time.     REVIEW OF SYSTEMS  Constitutional:  Denies fever, chills, weight loss or weakness  GI:  Denies abdominal pain, nausea, vomiting, diarrhea. Endorses abdominal pain, nausea.   : Denies dysuria, denies hematuria  Musculoskeletal:  Denies any new muscle/joint pain, swelling or loss of  function.  Skin:  Denies rash, pallor  All other systems negative unless noted in HPI.      PAST MEDICAL HISTORY:  Past Medical History:   Diagnosis Date     Diabetes (H)      HLD (hyperlipidemia)      Obesity        PAST SURGICAL HISTORY:  Past Surgical History:   Procedure Laterality Date     VASECTOMY       [facility]         CURRENT MEDICATIONS:    Prior to Admission Medications   Prescriptions Last Dose Informant Patient Reported? Taking?   amoxicillin-clavulanate (AUGMENTIN) 875-125 MG tablet   No No   Sig: Take 1 tablet by mouth 2 times daily for 7 days   metFORMIN (GLUCOPHAGE-XR) 500 MG 24 hr tablet   No No   Sig: Take 1 tablet (500 mg) by mouth 2 times daily (with meals)   omeprazole (PRILOSEC) 20 MG DR capsule   No No   Sig: Take 1 capsule (20 mg) by mouth daily      Facility-Administered Medications: None       ALLERGIES:  No Known Allergies    FAMILY HISTORY:  Family History   Problem Relation Age of Onset     Hypertension Mother      Heart Failure Mother 36     Diabetes Father      Hypertension Father      Cerebrovascular Disease Father 88     Thyroid Cancer Brother      Diabetes Paternal Aunt        SOCIAL HISTORY:  Social History     Tobacco Use     Smoking status: Never Smoker     Smokeless tobacco: Never Used   Substance Use Topics     Alcohol use: Yes     Drug use: No        VITALS:  Patient Vitals for the past 24 hrs:   BP Temp Pulse Resp SpO2 Weight   03/21/22 2300 135/78 -- 88 -- 93 % --   03/21/22 2230 129/82 -- 88 -- 95 % --   03/21/22 1937 119/70 98.5  F (36.9  C) 117 20 98 % 93.9 kg (207 lb)       PHYSICAL EXAM   General Appearance: Well-appearing, well-nourished, no acute distress   Head:  Normocephalic, atraumatic  Eyes:  conjunctiva/corneas clear  ENT: membranes are moist without pallor  Neck:  Supple  Cardio:  Regular rate and rhythm, no murmur/gallop/rub  Pulm:  No respiratory distress, clear to auscultation bilaterally  Back:  No CVA tenderness, normal ROM  Abdomen:  Soft,  non-tender, non distended,no rebound or guarding.  Extremities: Moves all extremities normally, normal gait  Skin:  Skin warm, dry, no rashes  Neuro:  Alert and oriented ×3, moving all extremities, no gross sensory defects     RADIOLOGY/LABS:  Reviewed all pertinent imaging. Please see official radiology report. All pertinent labs reviewed and interpreted.    Results for orders placed or performed during the hospital encounter of 03/21/22   Abdomen US, limited (RUQ only)    Impression    IMPRESSION:  1.  Cholelithiasis.  2.  Mild gallbladder wall thickening where seen.  3.  Sonographic Vaughan's sign negative. No biliary dilatation.       Hepatic function panel   Result Value Ref Range    Bilirubin Total 3.4 (H) 0.0 - 1.0 mg/dL    Bilirubin Direct 1.1 (H) <=0.5 mg/dL    Protein Total 8.0 6.0 - 8.0 g/dL    Albumin 4.3 3.5 - 5.0 g/dL    Alkaline Phosphatase 120 45 - 120 U/L     (H) 0 - 40 U/L     (H) 0 - 45 U/L   CBC (+ platelets, no diff)   Result Value Ref Range    WBC Count 10.0 4.0 - 11.0 10e3/uL    RBC Count 6.83 (H) 4.40 - 5.90 10e6/uL    Hemoglobin 12.7 (L) 13.3 - 17.7 g/dL    Hematocrit 42.6 40.0 - 53.0 %    MCV 62 (L) 78 - 100 fL    MCH 18.6 (L) 26.5 - 33.0 pg    MCHC 29.8 (L) 31.5 - 36.5 g/dL    RDW 17.7 (H) 10.0 - 15.0 %    Platelet Count 256 150 - 450 10e3/uL       The creation of this record is based on the scribe s observations of the work being performed by Alix Dowell MD and the provider s statements to them. It was created on his behalf by Mary Messer, a trained medical scribe. This document has been checked and approved by the attending provider.    Alix Dowell MD  Emergency Medicine  [facility] EMERGENCY DEPARTMENT  [address] [place] [id number] [phone]  Dept: [phone]      Alix Dowell MD  03/22/22 0003

## 2022-03-22 NOTE — ANESTHESIA PROCEDURE NOTES
Airway       Patient location during procedure: OR       Procedure Start/Stop Times: 3/22/2022 3:31 PM  Staff -        Anesthesiologist:  Caridad Rai MD       CRNA: Huma Bustillo APRN CRNA       Performed By: CRNAIndications and Patient Condition       Indications for airway management: tawanda-procedural         Mask difficulty assessment: 1 - vent by mask    Final Airway Details       Final airway type: endotracheal airway       Successful airway: ETT - single  Endotracheal Airway Details        ETT size (mm): 8.0       Cuffed: yes       Successful intubation technique: direct laryngoscopy       DL Blade Type: Sandoval 2       Grade View of Cords: 1       Adjucts: stylet       Position: Right       Measured from: gums/teeth       Secured at (cm): 23    Post intubation assessment        Placement verified by: capnometry, equal breath sounds and chest rise        Number of attempts at approach: 1       Secured with: silk tape       Ease of procedure: easy (bite block placed by GI team member)       Dentition: Intact and Unchanged

## 2022-03-22 NOTE — PROVIDER NOTIFICATION
House officer called to clarify insulin orders now that patient is no longer NPO.   before supper.  HO called back and requested hospitalist be called.   Page out to Dr. He.

## 2022-03-22 NOTE — CONSULTS
"Care Management Initial Consult       Concerns to be Addressed:   Workup for epigastric pain in progress (\"Elevated liver enzymes - Possible choledocholithiasis/sludge in bile duct. Acute hepatitis, medication reaction also on differential.\" Per GI notes): IV Zosyn, NPO, IV fluid support. Surgery consulted.   Patient plan of care discussed at interdisciplinary rounds: Yes    Anticipated Discharge Disposition:  Home      Anticipated Discharge Services:  None  Anticipated Discharge DME:  None    Patient/family educated on Medicare website which has current facility and service quality ratings:  NA  Education Provided on the Discharge Plan:  Per team  Patient/Family in Agreement with the Plan:  yes    Referrals Placed by CM/SW:  none  Private pay costs discussed: Not applicable     Additional Information:  See below    General Information  Assessment completed with: Spouse or significant other, Ketty  Type of CM/SW Visit: Initial Assessment    Primary Care Provider verified and updated as needed: Yes   Readmission within the last 30 days:        Reason for Consult: discharge planning  Advance Care Planning:       General Information Comments: Patient independent at baseline    Communication Assessment  Patient's communication style: spoken language (English or Bilingual)    Hearing Difficulty or Deaf: no   Wear Glasses or Blind: yes    Cognitive  Cognitive/Neuro/Behavioral:                        Living Environment:   People in home: spouse  Ketty  Current living Arrangements: house      Able to return to prior arrangements: yes  Living Arrangement Comments: Goal is a home discharge    Family/Social Support:  Care provided by: self  Provides care for: no one  Marital Status:   Wife  Ketty       Description of Support System: Supportive       Current Resources:   Patient receiving home care services: No     Community Resources: None  Equipment currently used at home: none  Supplies currently used at home: " None    Employment/Financial:  Employment Status: employed full-time, 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)        Financial Concerns: No concerns identified      Lifestyle & Psychosocial Needs:  Social Determinants of Health     Tobacco Use: Low Risk      Smoking Tobacco Use: Never Smoker     Smokeless Tobacco Use: Never Used   Alcohol Use: Not on file   Financial Resource Strain: Not on file   Food Insecurity: Not on file   Transportation Needs: Not on file   Physical Activity: Not on file   Stress: Not on file   Social Connections: Not on file   Intimate Partner Violence: Not on file   Depression: Not at risk     PHQ-2 Score: 0   Housing Stability: Not on file       Functional Status:  Prior to admission patient needed assistance:   Dependent ADLs:: Independent  Dependent IADLs:: Independent     Mental Health Status:  Mental Health Status: No Current Concerns       Chemical Dependency Status:  Chemical Dependency Status: No Current Concerns           Values/Beliefs:  Spiritual, Cultural Beliefs, Judaism Practices, Values that affect care:               Additional Information:  Writer met with patient's spouse Ketty coleman the bedside (patient sleeping). She is aware that patient is currently here under observation status. Patient is  and independent with activities of daily living at baseline.  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 He was recently diagnosed with diabetes and would appreciate diabetic education and input. No care management needs identified at this time but CM will continue to monitor progression of care, review team recommendations and provide discharge planning assist as needed.      Chichi Mullins RN

## 2022-03-22 NOTE — PROGRESS NOTES
.  Hospitalist Progress Note    Assessment/Plan  Active Problems:    Acute cholecystitis    Nausea    Abnormal LFTs    Farhat Tejada is a 61 year old male admitted on 3/21/2022.   61-year-old male past medical history of type II DM, GERD and hyperlipidemia presented with epigastric abdominal pain admitted for acute cholecystitis        Abdominal pain  Suspected cholecystitis  -Ultrasound showing cholelithiasis and mild gallbladder wall thickening.    -Surgery and GI following, planned for lap heather with ERCP today  -Pain control, NPO, IVF, abx  -Trend LFT's, recheck in AM        Elevated liver enzymes.  -, AST 33   -CT abdomen and ultrasound showing no CBD Dilatation  -Monitor serially  -Appreciate GI and surgery input     Type II DM  -Recently diagnosed, started on Metformin,  -Hold home Metformin and start sliding scale insulin     Hyperlipidemia  -Hold statin until LFTs improve    Barriers to Discharge:  Surgery, post-op monitoring    Anticipated discharge date/Disposition: Tomorrow ?    Subjective  .  Patient is new to me. Chart reviewed and events noted.  Patient seen and examined.   Boarding in ED when I visited patient. Pain rated 1-2/10. No N/V. No fever.    Objective    Vital signs in last 24 hours  Temp:  [98.5  F (36.9  C)-99.7  F (37.6  C)] 98.5  F (36.9  C)  Pulse:  [] 84  Resp:  [16-20] 16  BP: (115-135)/(62-82) 132/74  SpO2:  [93 %-98 %] 98 % [unfilled] O2 Device: None (Room air)    Weight:   [unfilled] Weight change:     Intake/Output last 3 shifts  No intake/output data recorded.  Body mass index is 28.87 kg/m .    Physical Exam    General Appearance:    Alert, cooperative, no distress, appears stated age   Lungs:     Easy breathing   Cardiovascular:       Abdomen:        Neurologic:   Grossly normal     Pertinent Labs   Lab Results: personally reviewed.   Recent Labs   Lab 03/21/22  2135 03/21/22  0740   NA  --  135*   CO2  --  23   BUN  --  12   ALBUMIN 4.3 4.2   ALKPHOS 120 89    * 131*   * 135*     Recent Labs   Lab 03/21/22  2135 03/21/22  0740   WBC 10.0 7.0   HGB 12.7* 12.1*   HCT 42.6 41.0    251     Recent Labs   Lab 03/21/22  0740   TROPONINI <0.01     Invalid input(s): POCGLUFGR    Medications  Drug and lactation database from the [place] National Library of Medicine:  http://toxnet.nlm.nih.gov/cgi-bin/sis/htmlgen?LACT      Pertinent Radiology   Radiology Results:  Personally reviewed impressions    Reviewed labs in last 24 hours.    Advanced Care Planning:  Discharge Planning discussed with patient  Total time with this patient is 25 min with greater than 50% of time spent in coordination of care.  Care discussed and coordinated with patient, family by bedside.    This Note is created using dragon voice recognition software.  Errors in spelling or words which seems out of context are unintentional.  Sounds alike errors may have escaped editing.    Viviane Mariscal MD  Hospitalist

## 2022-03-22 NOTE — H&P
"[facility]    History and Physical - Hospitalist Service       Date of Admission:  3/21/2022    Assessment & Plan      Farhat Tejada is a 61 year old male admitted on 3/21/2022.     61-year-old male past medical history of type II DM, GERD and hyperlipidemia presented with epigastric abdominal pain admitted for acute cholecystitis      Abdominal pain  Suspected cholecystitis  -Ultrasound showing cholelithiasis and mild gallbladder wall thickening.  Nontender abdomen  -Surgery consulted from ED and patient to undergo cholecystectomy in the a.m.  -IV Zosyn, n.p.o., IV fluid, pain control      Elevated liver enzymes.  -, -doubled from one done earlier in the day, total bili 3.4, alk phos 120  -CT abdomen and ultrasound Showing No CBD Dilatation  -Monitor serially, GI consult     Type II DM  -Recently diagnosed, started on Metformin,  -Hold home Metformin and start sliding scale insulin    Hyperlipidemia  -Hold statin until LFTs improve       Diet: NPO for Medical/Clinical Reasons Except for: Ice Chips, Meds    DVT Prophylaxis: Pneumatic Compression Devices  Ferreira Catheter: Not present  Central Lines: None  Cardiac Monitoring: None  Code Status: Full Code      Clinically Significant Risk Factors Present on Admission     # Diabetes, type II: last A1C 11.0 % (Ref range: 0.0 - 5.6 %)  # Overweight: Estimated body mass index is 28.87 kg/m  as calculated from the following:    Height as of 3/18/22: 1.803 m (5' 11\").    Weight as of this encounter: 93.9 kg (207 lb).      Disposition Plan   Expected Discharge:        The patient's care was discussed with the Patient.    Nabor Scott MD  Hospitalist Service  [facility]  Securely message with the Vocera Web Console (learn more here)  Text page via sickweather Paging/Directory         ______________________________________________________________________    Chief Complaint   Abdominal pain    History is obtained from " the patient    History of Present Illness   Farhat Tejada is a 61 year old male with obesity, hyperlipidemia recently diagnosed type II DM, presented with abdominal pain.  Initially came in earlier in the morning to the ED and discharged to follow-up as outpatient after he was noted to have cholelithiasis on CT abdomen.  Returns back the same night with worsening pain with associated nausea.  Repeat ultrasound showed cholelithiasis and mild gallbladder wall thickening.  Liver function tests with elevated  and  which have doubled from the one earlier in the day.  Surgery was consulted and patient admitted for planned cholecystectomy in the a.m.      Review of Systems    The 10 point Review of Systems is negative other than noted in the HPI or here.     Past Medical History    I have reviewed this patient's medical history and updated it with pertinent information if needed.   Past Medical History:   Diagnosis Date     Diabetes (H)      HLD (hyperlipidemia)      Obesity        Past Surgical History   I have reviewed this patient's surgical history and updated it with pertinent information if needed.  Past Surgical History:   Procedure Laterality Date     VASECTOMY       [facility]         Social History   I have reviewed this patient's social history and updated it with pertinent information if needed.  Social History     Tobacco Use     Smoking status: Never Smoker     Smokeless tobacco: Never Used   Substance Use Topics     Alcohol use: Yes     Drug use: No       Family History   I have reviewed this patient's family history and updated it with pertinent information if needed.  Family History   Problem Relation Age of Onset     Hypertension Mother      Heart Failure Mother 36     Diabetes Father      Hypertension Father      Cerebrovascular Disease Father 88     Thyroid Cancer Brother      Diabetes Paternal Aunt        Prior to Admission Medications   Prior to Admission Medications    Prescriptions Last Dose Informant Patient Reported? Taking?   amoxicillin-clavulanate (AUGMENTIN) 875-125 MG tablet   No No   Sig: Take 1 tablet by mouth 2 times daily for 7 days   metFORMIN (GLUCOPHAGE-XR) 500 MG 24 hr tablet   No No   Sig: Take 1 tablet (500 mg) by mouth 2 times daily (with meals)   omeprazole (PRILOSEC) 20 MG DR capsule   No No   Sig: Take 1 capsule (20 mg) by mouth daily      Facility-Administered Medications: None     Allergies   No Known Allergies    Physical Exam   Vital Signs: Temp: 98.5  F (36.9  C)   BP: 115/62 Pulse: 81   Resp: 20 SpO2: 97 % O2 Device: None (Room air)    Weight: 207 lbs 0 oz    General Appearance: Alert oriented  Eyes: Pink conjunctiva  HEENT: PERRLA  Respiratory: Bilaterally clear  Cardiovascular: S1, S2  GI: Soft, nontender on exam.  Normoactive bowel sounds  Genitourinary: No CVA or SP tenderness  Musculoskeletal: No peripheral edema  Neurologic: AOx3    Data   Data reviewed today: I reviewed all medications, new labs and imaging results over the last 24 hours. I personally reviewed   Recent Labs   Lab 03/22/22  0335 03/21/22  2135 03/21/22  0740   WBC  --  10.0 7.0   HGB  --  12.7* 12.1*   MCV  --  62* 63*   PLT  --  256 251   NA  --   --  135*   POTASSIUM  --   --  4.3   CHLORIDE  --   --  100   CO2  --   --  23   BUN  --   --  12   CR  --   --  1.17   ANIONGAP  --   --  12   KERRI  --   --  9.6   *  --  260*   ALBUMIN  --  4.3 4.2   PROTTOTAL  --  8.0 7.5   BILITOTAL  --  3.4* 2.0*   ALKPHOS  --  120 89   ALT  --  306* 131*   AST  --  336* 135*   LIPASE  --   --  51     Recent Results (from the past 24 hour(s))   XR Chest Port 1 View    Narrative    EXAM: XR CHEST PORT 1 VIEW  LOCATION: [facility]  DATE/TIME: 3/21/2022 7:49 AM    INDICATION: chest pain  COMPARISON: 06/19/2017      Impression    IMPRESSION: Lungs are clear. Heart and pulmonary vascularity are normal. No signs of acute disease.   CT Abdomen Pelvis w Contrast     Narrative    EXAM: CT ABDOMEN PELVIS W CONTRAST  LOCATION: [facility]  DATE/TIME: 3/21/2022 11:33 AM    INDICATION: Elevated bilirubin. Evaluate for pancreatic lesion versus CBD stone.   COMPARISON: None.  TECHNIQUE: CT scan of the abdomen and pelvis was performed following injection of IV contrast. Multiplanar reformats were obtained. Dose reduction techniques were used.  CONTRAST: IsoVue 370 100mL    FINDINGS:   LOWER CHEST: Normal.    HEPATOBILIARY: Hepatic steatosis. Debris in the gallbladder. No bile duct dilatation. There is a 7 mm enhancing focus in the right hepatic lobe (series 3 image 50).    PANCREAS: No significant mass, duct dilatation, or inflammatory change.    SPLEEN: Normal.    ADRENAL GLANDS: Normal.    KIDNEYS/BLADDER: A simple cyst at the lower pole of the right kidney does not require follow-up.     BOWEL: Normal.    LYMPH NODES: Mild haziness in the mesentery is nonspecific.     VASCULATURE: Normal.     PELVIC ORGANS: Normal.    MUSCULOSKELETAL: Normal.      Impression    IMPRESSION:   1.  Debris in the gallbladder. No bile duct dilatation.  2.  No pancreatic duct dilatation.  3.  Hepatic steatosis.  4.  A subcentimeter enhancing hepatic focus is indeterminate. This is likely a benign vascular lesion. No follow-up required.    Abdomen US, limited (RUQ only)    Narrative    EXAM: US ABDOMEN LIMITED  LOCATION: [facility]  DATE/TIME: 3/21/2022 10:58 PM    INDICATION: Sludge seen on CT scan earlier today.  No vomiting rule out cholelithiasis cholecystitis  COMPARISON: 03/21/2022  TECHNIQUE: Limited abdominal ultrasound.    FINDINGS:    GALLBLADDER: MYAH sign suggesting cholelithiasis. Sonographic Vaughan's sign negative. Gallbladder wall thickening, 5 mm.    BILE DUCTS: No biliary dilatation. The common duct measures 3 mm.    LIVER: Hepatic steatosis.    RIGHT KIDNEY: No hydronephrosis.    PANCREAS: Obscured by bowel gas.    No visible ascites.       Impression    IMPRESSION:  1.  Cholelithiasis.  2.  Mild gallbladder wall thickening where seen.  3.  Sonographic Vaughan's sign negative. No biliary dilatation.

## 2022-03-22 NOTE — ANESTHESIA CARE TRANSFER NOTE
Patient: Farhat Tejada    Procedure: Procedure(s):  ESOPHAGOGASTRODUODENOSCOPY, WITH FINE NEEDLE ASPIRATION BIOPSY, WITH ENDOSCOPIC ULTRASOUND GUIDANCE  ENDOSCOPIC RETROGRADE CHOLANGIOPANCREATOGRAPHY, BILIARY SPHINCTEROTOMY STONE EXTRACTION       Diagnosis: Acute cholecystitis [K81.0]  Nausea [R11.0]  Abnormal LFTs [R94.5]  Diagnosis Additional Information: No value filed.    Anesthesia Type:   MAC     Note:    Oropharynx: oropharynx clear of all foreign objects  Level of Consciousness: drowsy  Patient oxygen source: oxy mask.  Level of Supplemental Oxygen (L/min / FiO2): 8  Independent Airway: airway patency satisfactory and stable  Dentition: dentition unchanged  Vital Signs Stable: post-procedure vital signs reviewed and stable  Report to RN Given: handoff report given  Patient transferred to: PACU    Handoff Report: Identifed the Patient, Identified the Reponsible Provider, Reviewed the pertinent medical history, Discussed the surgical course, Reviewed Intra-OP anesthesia mangement and issues during anesthesia, Set expectations for post-procedure period and Allowed opportunity for questions and acknowledgement of understanding      Vitals:  Vitals Value Taken Time   /84 03/22/22 1631   Temp 36.2  C (97.1  F) 03/22/22 1631   Pulse 98 03/22/22 1631   Resp 16 03/22/22 1631   SpO2 100 % 03/22/22 1631       Electronically Signed By: KEENAN Ashby CRNA  March 22, 2022  4:34 PM

## 2022-03-22 NOTE — ANESTHESIA POSTPROCEDURE EVALUATION
Patient: Farhat Tejada    Procedure: Procedure(s):  ESOPHAGOGASTRODUODENOSCOPY, WITH FINE NEEDLE ASPIRATION BIOPSY, WITH ENDOSCOPIC ULTRASOUND GUIDANCE  ENDOSCOPIC RETROGRADE CHOLANGIOPANCREATOGRAPHY, BILIARY SPHINCTEROTOMY STONE EXTRACTION       Anesthesia Type:  MAC    Note:     Postop Pain Control: Uneventful            Sign Out: Well controlled pain   PONV: No   Neuro/Psych: Uneventful            Sign Out: Acceptable/Baseline neuro status   Airway/Respiratory: Uneventful            Sign Out: Acceptable/Baseline resp. status   CV/Hemodynamics: Uneventful            Sign Out: Acceptable CV status; No obvious hypovolemia; No obvious fluid overload   Other NRE: NONE   DID A NON-ROUTINE EVENT OCCUR? No           Last vitals:  Vitals Value Taken Time   /77 03/22/22 1715   Temp 38  C (100.4  F) 03/22/22 1715   Pulse 101 03/22/22 1722   Resp 15 03/22/22 1721   SpO2 94 % 03/22/22 1722   Vitals shown include unvalidated device data.    Electronically Signed By: Caridad Rai MD  March 22, 2022  5:30 PM

## 2022-03-22 NOTE — H&P
GENERAL PRE-PROCEDURE:   Procedure:  EUS/ERCP - Choledocholithiasis suspected  Date/Time:  3/22/2022 1:07 PM    Verbal consent obtained?: Yes    Written consent obtained?: Yes    Risks and benefits: Risks, benefits and alternatives were discussed    Consent given by:  Patient  Patient states understanding of procedure being performed: Yes    Patient's understanding of procedure matches consent: Yes    Procedure consent matches procedure scheduled: Yes    Expected level of sedation:  Deep  Appropriately NPO:  Yes  ASA Class:  3  Mallampati  :  Grade 3- soft palate visible, posterior pharyngeal wall not visible  Lungs:  Lungs clear with good breath sounds bilaterally  Heart:  Normal heart sounds and rate  History & Physical reviewed:  History and physical reviewed and no updates needed  Statement of review:  I have reviewed the lab findings, diagnostic data, medications, and the plan for sedation

## 2022-03-22 NOTE — CONSULTS
[facility] DIGESTIVE HEALTH CONSULTATION    Farhat Layne [facility] [id number]  61 year old male    Admission Date/Time: 3/21/2022 10:05 PM    Primary Care Provider:  Helena Magaña    Requesting Physician: Viviane Mariscal MD      CHIEF COMPLAINT:   Epigastric pain    REASON FOR THE CONSULT:  Epigastric pain, elevated liver enzymes    HPI:   61 year old yo M recently diagnosed with DM (A1c 11,) presents to the hospital with epigastric/chest pain. Sudden onset 2 days prior. Persistent. Radiated to LUQ. Associated nausea. No emesis. Recently started on Metformin which resulted in some loose stool. Denies acholic stool, melena or hematochezia. Also treated with Amoxicillin and single dose of fluconazole for presumed infection of penis. Denies sick contacts. Recent travel to [place], returned ~2-3 weeks ago. Pain continued and because concern for chest pain, pt presented to ER. Trop normal. EKG unremarkable. LFTs elevated. Imaging with sludge and stones in GB. No biliary dilation, CBD 3mm. Pt denies regular OTC (PRN Pepcid,) herbal suppl. Pain persists. No jaundice. Denies SOB, diaphoresis. Rare EtOH, no MYA. Denies APAP.  Hx of elevated bilirubin. Chronic, mild. Known thalassemia.      REVIEW OF SYSTEMS:   10 point ROS neg other than the symptoms noted above in the HPI.    MEDICATIONS:  Current Outpatient Medications   Medication Sig Dispense Refill     amoxicillin-clavulanate (AUGMENTIN) 875-125 MG tablet Take 1 tablet by mouth 2 times daily for 7 days 14 tablet 0     metFORMIN (GLUCOPHAGE-XR) 500 MG 24 hr tablet Take 1 tablet (500 mg) by mouth 2 times daily (with meals) 60 tablet 0     omeprazole (PRILOSEC) 20 MG DR capsule Take 1 capsule (20 mg) by mouth daily 30 capsule 0       PAST MEDICAL HISTORY:  Past Medical History:   Diagnosis Date     Diabetes (H)      HLD (hyperlipidemia)      Obesity        PAST SURGICAL HISTORY:  Past Surgical History:   Procedure Laterality Date      VASECTOMY       [facility]         FAMILY HISTORY:  Family History   Problem Relation Age of Onset     Hypertension Mother      Heart Failure Mother 36     Diabetes Father      Hypertension Father      Cerebrovascular Disease Father 88     Thyroid Cancer Brother      Diabetes Paternal Aunt        SOCIAL HISTORY:  Social History     Tobacco Use     Smoking status: Never Smoker     Smokeless tobacco: Never Used   Substance Use Topics     Alcohol use: Yes       ALLERGIES/SENSITIVITIES:  Patient has no known allergies.      PHYSICAL EXAM:  /63   Pulse 83   Temp 99.4  F (37.4  C) (Oral)   Resp 20   Wt 93.9 kg (207 lb)   SpO2 95%   BMI 28.87 kg/m    Body mass index is 28.87 kg/m .  General: A&O, NAD, non-toxic appearing  Eyes: No icterus or conjunctivitis  ENT: MMM, OP clear without ulcerations  Neck/Thyroid: Supple, no masses  Pulmonary: CTA B  Cardiovascular: RR, S1, S2  Gastrointestinal: Soft, mild TTP in epigastrium, no r/g, no masses, no HSM  Skin: No jaundice/petechiae/rashes  Lymph: No cervical or supraclavicular lymphadenopathy  Extrem: PPI, no c/c/e      LABORATORY DATA:  CBC:  Recent Labs   Lab Test 03/21/22 2135   WBC 10.0   RBC 6.83*   HGB 12.7*   HCT 42.6   MCV 62*   MCH 18.6*   MCHC 29.8*   RDW 17.7*           BMP:  Recent Labs   Lab 03/22/22  0640 03/22/22  0335 03/21/22  0740   NA  --   --  135*   POTASSIUM  --   --  4.3   CHLORIDE  --   --  100   CO2  --   --  23   * 238* 260*   CR  --   --  1.17   BUN  --   --  12       INR:  No results for input(s): INR in the last 95554 hours.    Liver and Pancreas panel:  Recent Labs   Lab 03/21/22 2135 03/21/22  0740   * 135*   * 131*   ALKPHOS 120 89   BILITOTAL 3.4* 2.0*   LIPASE  --  51         IMAGING:    Abdomen US, limited (RUQ only)    Result Date: 3/21/2022  EXAM: US ABDOMEN LIMITED LOCATION: [facility] DATE/TIME: 3/21/2022 10:58 PM INDICATION: Sludge seen on CT scan earlier today.   No vomiting rule out cholelithiasis cholecystitis COMPARISON: 03/21/2022 TECHNIQUE: Limited abdominal ultrasound. FINDINGS: GALLBLADDER: MYAH sign suggesting cholelithiasis. Sonographic Vaughan's sign negative. Gallbladder wall thickening, 5 mm. BILE DUCTS: No biliary dilatation. The common duct measures 3 mm. LIVER: Hepatic steatosis. RIGHT KIDNEY: No hydronephrosis. PANCREAS: Obscured by bowel gas. No visible ascites.     IMPRESSION: 1.  Cholelithiasis. 2.  Mild gallbladder wall thickening where seen. 3.  Sonographic Vaughan's sign negative. No biliary dilatation.       XR Chest Port 1 View    Result Date: 3/21/2022  EXAM: XR CHEST PORT 1 VIEW LOCATION: [facility] DATE/TIME: 3/21/2022 7:49 AM INDICATION: chest pain COMPARISON: 06/19/2017     IMPRESSION: Lungs are clear. Heart and pulmonary vascularity are normal. No signs of acute disease.      CT Abdomen Pelvis w Contrast    Result Date: 3/21/2022  EXAM: CT ABDOMEN PELVIS W CONTRAST LOCATION: [facility] DATE/TIME: 3/21/2022 11:33 AM INDICATION: Elevated bilirubin. Evaluate for pancreatic lesion versus CBD stone. COMPARISON: None. TECHNIQUE: CT scan of the abdomen and pelvis was performed following injection of IV contrast. Multiplanar reformats were obtained. Dose reduction techniques were used. CONTRAST: IsoVue 370 100mL FINDINGS: LOWER CHEST: Normal. HEPATOBILIARY: Hepatic steatosis. Debris in the gallbladder. No bile duct dilatation. There is a 7 mm enhancing focus in the right hepatic lobe (series 3 image 50). PANCREAS: No significant mass, duct dilatation, or inflammatory change. SPLEEN: Normal. ADRENAL GLANDS: Normal. KIDNEYS/BLADDER: A simple cyst at the lower pole of the right kidney does not require follow-up. BOWEL: Normal. LYMPH NODES: Mild haziness in the mesentery is nonspecific. VASCULATURE: Normal. PELVIC ORGANS: Normal. MUSCULOSKELETAL: Normal.     IMPRESSION: 1.  Debris in the gallbladder. No bile  duct dilatation. 2.  No pancreatic duct dilatation. 3.  Hepatic steatosis. 4.  A subcentimeter enhancing hepatic focus is indeterminate. This is likely a benign vascular lesion. No follow-up required.       ASSESSMENT:   1. Elevated liver enzymes - Possible choledocholithiasis/sludge in bile duct. Acute hepatitis, medication reaction also on differential.  2. Epigastric pain  3. DM - On Metformin  4. Penial infection - Recent treatment with Amoxicillin, single dose fluconazole.  5. Thalassemia - Microcytosis    PLAN:  -Check acute hepatitis panel.  -NPO  -Surgery consult pending. If proceeding with heather, recommend IOC.  -Will plan on EUS +/- ERCP if no plan on surgical intervention.  -Daily LFTs    Approximately 25 minutes of total time was spent providing patient care including patient evaluation, reviewing documentation/test results, and .             Adilson Mendiola MD  Thank you for the opportunity to participate in the care of this patient.   Please feel free to call me with any questions or concerns.  Phone number [phone].            CC: [facility] Jessica Dawn     (4) rarely moist

## 2022-03-22 NOTE — PHARMACY-ADMISSION MEDICATION HISTORY
Pharmacy Note - Admission Medication History    Pertinent Provider Information: Pt also took fluconazole 150 mg x1 on Fri 3/18 ______________________________________________________________________    Prior To Admission (PTA) med list completed and updated in EMR.       PTA Med List   Medication Sig Last Dose     amoxicillin-clavulanate (AUGMENTIN) 875-125 MG tablet Take 1 tablet by mouth 2 times daily for 7 days 3/21/2022 at x1     metFORMIN (GLUCOPHAGE-XR) 500 MG 24 hr tablet Take 1 tablet (500 mg) by mouth 2 times daily (with meals) 3/21/2022 at x1     omeprazole (PRILOSEC) 20 MG DR capsule Take 1 capsule (20 mg) by mouth daily 3/21/2022 at Unknown time       Information source(s): Patient, Family member and [facility]  Method of interview communication: in-person    Summary of Changes to PTA Med List  New: none  Discontinued: none  Changed: none    Patient was asked about OTC/herbal products specifically.  PTA med list reflects this.    In the past week, patient estimated taking medication this percent of the time:  greater than 90%.    Allergies were reviewed, assessed, and updated with the patient.      Patient does not use any multi-dose medications prior to admission.    The information provided in this note is only as accurate as the sources available at the time of the update(s).    Thank you,  Rosa Isela Hernandez, [facility], located within [facility]  3/22/2022 7:33 AM

## 2022-03-22 NOTE — ED TRIAGE NOTES
Patient with upper abdominal pain that he was seen here for earlier today. Patient states diagnosed with something with his gallbladder. States he was instructed to come back if he became nauseated, is now nauseous. Took Omerprozole when he got home.

## 2022-03-22 NOTE — CONSULTS
General Surgery Consultation  Farhat Tejada MRN# [id number]   Age/Sex: 61 year old male YOB: 1960     Reason for consult: 1. Elevated liver enzymes    2. Acute cholecystitis    3. Nausea    4. Abnormal LFTs            Requesting physician: Nabor Scott MD                   Assessment and Plan:   Assessment:  1.  Questionable choledocholithiasis -the history of the gallstones as well as the elevated liver enzymes, it is possible that patient could have choledocholithiasis.  The ultrasound of shows that the patient's common bile duct is 3.  It is possible that the patient's elevated liver enzymes could have another etiology.  2.  Gallstones  3. biliary colic    Plan:  -Given the possibility for choledocholithiasis and the biliary colic secondary to the gallstones, recommendation is for the patient to undergo laparoscopic cholecystectomy.  The GI team has already plan for the patient to undergo ERCP.  We will attempt to coordinate with the GI team as well as the OR to determine if we can do a combination case with an ERCP and laparoscopic cholecystectomy following the completion of the ERCP.  If we are unable to do so, we will proceed with doing laparoscopic cholecystectomy tomorrow.  -Continue medical management per primary team.  -Thank you for allowing us to participate in this patient's medical care.          Chief Complaint:     Chief Complaint   Patient presents with     Abdominal Pain        History is obtained from the patient    HPI:   Farhat Tejada is a 61 year old male who presents to the hospital with complaints of abdominal pain.  Patient states that he was recently seen in the hospital in the emergency room.  The patient was found to have gallstones and was told to follow-up as an outpatient.  Unfortunate the patient's symptoms did worsen and he came back in for further evaluation.  Patient was found to have elevated LFTs which were concerning for possible choledocholithiasis.  General  surgery team was consulted to evaluate this patient.    On discussion today, the patient states that his abdominal pain is in the right upper quadrant.  Currently the pain is controlled with the current pain medication.  Prior to that the pain in the right upper quadrant and epigastric region was 10 out of 10.  Patient did have some nausea but no vomiting.  Patient has no further complaints at this time.  He is a first-time patients ever had this symptom.  Patient has no history of abdominal surgeries.          Past Medical History:     Past Medical History:   Diagnosis Date     Diabetes (H)      HLD (hyperlipidemia)      Obesity               Past Surgical History:     Past Surgical History:   Procedure Laterality Date     VASECTOMY       [facility]               Social History:    reports that he has never smoked. He has never used smokeless tobacco. He reports current alcohol use. He reports that he does not use drugs.           Family History:     Family History   Problem Relation Age of Onset     Hypertension Mother      Heart Failure Mother 36     Diabetes Father      Hypertension Father      Cerebrovascular Disease Father 88     Thyroid Cancer Brother      Diabetes Paternal Aunt               Allergies:   No Known Allergies           Medications:     Prior to Admission medications    Medication Sig Start Date End Date Taking? Authorizing Provider   amoxicillin-clavulanate (AUGMENTIN) 875-125 MG tablet Take 1 tablet by mouth 2 times daily for 7 days 3/18/22 3/25/22 Yes Lucero Small PA-C   metFORMIN (GLUCOPHAGE-XR) 500 MG 24 hr tablet Take 1 tablet (500 mg) by mouth 2 times daily (with meals) 3/18/22  Yes Lucero Small PA-C   omeprazole (PRILOSEC) 20 MG DR capsule Take 1 capsule (20 mg) by mouth daily 3/21/22 4/20/22 Yes Messi Sauer MD              Review of Systems:   The Review of Systems is negative other than noted in the HPI            Physical Exam:     Patient Vitals for the past 24  hrs:   BP Temp Temp src Pulse Resp SpO2 Weight   03/22/22 1115 -- -- -- 87 -- 94 % --   03/22/22 0948 -- 99.7  F (37.6  C) -- -- -- -- --   03/22/22 0900 -- -- -- 86 -- 97 % --   03/22/22 0847 -- 99.4  F (37.4  C) Oral 83 -- 95 % --   03/22/22 0720 -- -- -- 86 -- 95 % --   03/22/22 0600 116/63 -- -- 97 -- 94 % --   03/22/22 0400 133/63 -- -- 82 -- 98 % --   03/22/22 0200 118/64 -- -- 73 -- 97 % --   03/22/22 0020 115/62 -- -- 81 -- 97 % --   03/21/22 2300 135/78 -- -- 88 -- 93 % --   03/21/22 2230 129/82 -- -- 88 -- 95 % --   03/21/22 1937 119/70 98.5  F (36.9  C) -- 117 20 98 % 93.9 kg (207 lb)        No intake or output data in the 24 hours ending 03/22/22 1214   Constitutional:   awake, alert, cooperative, no apparent distress, and appears stated age       Eyes:   PERRL, conjunctiva/corneas clear, EOM's intact; no scleral edema or icterus noted        ENT:   Normocephalic, without obvious abnormality, atraumatic, Lips, mucosa, and tongue normal        Hematologic / Lymphatic:   No lymphadenopathy       Lungs:   Normal respiratory effort, no accessory muscle use       Cardiovascular:   Regular rate and rhythm       Abdomen:   Tender to palpation in the right upper quadrant.  Nondistended.       Musculoskeletal:   No obvious swelling, bruising or deformity       Skin:   Skin color and texture normal for patient, no rashes or lesions              Data:        All imaging studies reviewed by me.  I personally reviewed the imagings and agree with the read from the radiologist.  I do identify the gallstones.    Results for orders placed or performed during the hospital encounter of 03/21/22 (from the past 24 hour(s))   Hepatic function panel   Result Value Ref Range    Bilirubin Total 3.4 (H) 0.0 - 1.0 mg/dL    Bilirubin Direct 1.1 (H) <=0.5 mg/dL    Protein Total 8.0 6.0 - 8.0 g/dL    Albumin 4.3 3.5 - 5.0 g/dL    Alkaline Phosphatase 120 45 - 120 U/L     (H) 0 - 40 U/L     (H) 0 - 45 U/L   CBC (+  platelets, no diff)   Result Value Ref Range    WBC Count 10.0 4.0 - 11.0 10e3/uL    RBC Count 6.83 (H) 4.40 - 5.90 10e6/uL    Hemoglobin 12.7 (L) 13.3 - 17.7 g/dL    Hematocrit 42.6 40.0 - 53.0 %    MCV 62 (L) 78 - 100 fL    MCH 18.6 (L) 26.5 - 33.0 pg    MCHC 29.8 (L) 31.5 - 36.5 g/dL    RDW 17.7 (H) 10.0 - 15.0 %    Platelet Count 256 150 - 450 10e3/uL   Abdomen US, limited (RUQ only)    Narrative    EXAM: US ABDOMEN LIMITED  LOCATION: [facility]  DATE/TIME: 3/21/2022 10:58 PM    INDICATION: Sludge seen on CT scan earlier today.  No vomiting rule out cholelithiasis cholecystitis  COMPARISON: 03/21/2022  TECHNIQUE: Limited abdominal ultrasound.    FINDINGS:    GALLBLADDER: MYAH sign suggesting cholelithiasis. Sonographic Vaughan's sign negative. Gallbladder wall thickening, 5 mm.    BILE DUCTS: No biliary dilatation. The common duct measures 3 mm.    LIVER: Hepatic steatosis.    RIGHT KIDNEY: No hydronephrosis.    PANCREAS: Obscured by bowel gas.    No visible ascites.      Impression    IMPRESSION:  1.  Cholelithiasis.  2.  Mild gallbladder wall thickening where seen.  3.  Sonographic Vaughan's sign negative. No biliary dilatation.       Asymptomatic COVID-19 Virus (Coronavirus) by PCR Nasopharyngeal    Specimen: Nasopharyngeal; Swab   Result Value Ref Range    SARS CoV2 PCR Negative Negative    Narrative    Testing was performed using the gumaro  SARS-CoV-2 & Influenza A/B Assay on the gumaro  Daniela  System.  This test should be ordered for the detection of SARS-COV-2 in individuals who meet SARS-CoV-2 clinical and/or epidemiological criteria. Test performance is unknown in asymptomatic patients.  This test is for in vitro diagnostic use under the FDA EUA for laboratories certified under CLIA to perform moderate and/or high complexity testing. This test has not been FDA cleared or approved.  A negative test does not rule out the presence of PCR inhibitors in the specimen or target RNA in  concentration below the limit of detection for the assay. The possibility of a false negative should be considered if the patient's recent exposure or clinical presentation suggests COVID-19.  [facility] are certified under the Clinical Laboratory Improvement Amendments of 1988 (CLIA-88) as qualified to perform moderate and/or high complexity laboratory testing.   Glucose by meter   Result Value Ref Range    GLUCOSE BY METER POCT 238 (H) 70 - 99 mg/dL   Glucose by meter   Result Value Ref Range    GLUCOSE BY METER POCT 221 (H) 70 - 99 mg/dL   Glucose by meter   Result Value Ref Range    GLUCOSE BY METER POCT 193 (H) 70 - 99 mg/dL        DO Branden Leiva, DO  General Surgeon  [facility]  Surgery Clinic - [address] [address]?  Office: [phone]  Employed by [facility] [employer]  Pager: [phone]

## 2022-03-22 NOTE — ANESTHESIA PREPROCEDURE EVALUATION
Anesthesia Pre-Procedure Evaluation    Patient: Farhat Tejada   MRN: [id number] : 1960        Procedure : Procedure(s):  ESOPHAGOGASTRODUODENOSCOPY, WITH FINE NEEDLE ASPIRATION BIOPSY, WITH ENDOSCOPIC ULTRASOUND GUIDANCE  ENDOSCOPIC RETROGRADE CHOLANGIOPANCREATOGRAPHY          Past Medical History:   Diagnosis Date     Diabetes (H)      HLD (hyperlipidemia)      Obesity       Past Surgical History:   Procedure Laterality Date     VASECTOMY       [facility]        No Known Allergies   Social History     Tobacco Use     Smoking status: Never Smoker     Smokeless tobacco: Never Used   Substance Use Topics     Alcohol use: Yes      Wt Readings from Last 1 Encounters:   22 93.9 kg (207 lb)        Anesthesia Evaluation   Pt has had prior anesthetic.     History of anesthetic complications  - PONV.      ROS/MED HX  ENT/Pulmonary:     (+) sleep apnea,     Neurologic:       Cardiovascular:       METS/Exercise Tolerance:     Hematologic:       Musculoskeletal:       GI/Hepatic:     (+) GERD,     Renal/Genitourinary:       Endo:     (+) type I DM, Obesity,     Psychiatric/Substance Use:       Infectious Disease:       Malignancy:       Other:            Physical Exam    Airway        Mallampati: II    Neck ROM: full     Respiratory Devices and Support         Dental  no notable dental history         Cardiovascular   cardiovascular exam normal          Pulmonary   pulmonary exam normal                OUTSIDE LABS:  CBC:   Lab Results   Component Value Date    WBC 10.0 2022    WBC 7.0 2022    HGB 12.7 (L) 2022    HGB 12.1 (L) 2022    HCT 42.6 2022    HCT 41.0 2022     2022     2022     BMP:   Lab Results   Component Value Date     (L) 2022     01/10/2020    POTASSIUM 4.3 2022    POTASSIUM 4.6 01/10/2020    CHLORIDE 100 2022    CHLORIDE 104 01/10/2020    CO2 23 2022    CO2 28 01/10/2020    BUN 12 2022     BUN 10 01/10/2020    CR 1.17 03/21/2022    CR 1.02 01/10/2020     (H) 03/22/2022     (H) 03/22/2022     COAGS: No results found for: PTT, INR, FIBR  POC: No results found for: BGM, HCG, HCGS  HEPATIC:   Lab Results   Component Value Date    ALBUMIN 4.3 03/21/2022    PROTTOTAL 8.0 03/21/2022     (H) 03/21/2022     (H) 03/21/2022    ALKPHOS 120 03/21/2022    BILITOTAL 3.4 (H) 03/21/2022     OTHER:   Lab Results   Component Value Date    A1C 11.0 (H) 03/18/2022    KERRI 9.6 03/21/2022    MAG 2.1 09/05/2018    LIPASE 51 03/21/2022       Anesthesia Plan    ASA Status:  2      Anesthesia Type: MAC.              Consents    Anesthesia Plan(s) and associated risks, benefits, and realistic alternatives discussed. Questions answered and patient/representative(s) expressed understanding.     - Discussed: Risks, Benefits and Alternatives for the PROCEDURE were discussed     - Discussed with:  Patient         Postoperative Care    Pain management: Multi-modal analgesia.        Comments:                Lizy Chiu MD

## 2022-03-23 ENCOUNTER — ANESTHESIA (OUTPATIENT)
Dept: SURGERY | Facility: HOSPITAL | Age: 62
End: 2022-03-23
Payer: COMMERCIAL

## 2022-03-23 PROBLEM — R74.8 ELEVATED LIVER ENZYMES: Status: ACTIVE | Noted: 2018-09-07

## 2022-03-23 LAB
ALBUMIN SERPL-MCNC: 3.4 G/DL (ref 3.5–5)
ALP SERPL-CCNC: 121 U/L (ref 45–120)
ALT SERPL W P-5'-P-CCNC: 719 U/L (ref 0–45)
ANION GAP SERPL CALCULATED.3IONS-SCNC: 10 MMOL/L (ref 5–18)
AST SERPL W P-5'-P-CCNC: 549 U/L (ref 0–40)
BASOPHILS # BLD AUTO: 0 10E3/UL (ref 0–0.2)
BASOPHILS NFR BLD AUTO: 0 %
BILIRUB SERPL-MCNC: 7.2 MG/DL (ref 0–1)
BUN SERPL-MCNC: 12 MG/DL (ref 8–22)
CALCIUM SERPL-MCNC: 9.2 MG/DL (ref 8.5–10.5)
CHLORIDE BLD-SCNC: 104 MMOL/L (ref 98–107)
CMV DNA SPEC NAA+PROBE-ACNC: NOT DETECTED IU/ML
CO2 SERPL-SCNC: 23 MMOL/L (ref 22–31)
CREAT SERPL-MCNC: 1.13 MG/DL (ref 0.7–1.3)
EBV DNA # SPEC NAA+PROBE: NOT DETECTED COPIES/ML
EOSINOPHIL # BLD AUTO: 0 10E3/UL (ref 0–0.7)
EOSINOPHIL NFR BLD AUTO: 0 %
ERYTHROCYTE [DISTWIDTH] IN BLOOD BY AUTOMATED COUNT: 16.2 % (ref 10–15)
GFR SERPL CREATININE-BSD FRML MDRD: 73 ML/MIN/1.73M2
GLUCOSE BLD-MCNC: 251 MG/DL (ref 70–125)
GLUCOSE BLDC GLUCOMTR-MCNC: 237 MG/DL (ref 70–99)
GLUCOSE BLDC GLUCOMTR-MCNC: 241 MG/DL (ref 70–99)
GLUCOSE BLDC GLUCOMTR-MCNC: 284 MG/DL (ref 70–99)
GLUCOSE BLDC GLUCOMTR-MCNC: 292 MG/DL (ref 70–99)
GLUCOSE BLDC GLUCOMTR-MCNC: 301 MG/DL (ref 70–99)
HAV IGM SERPL QL IA: NEGATIVE
HBV CORE IGM SERPL QL IA: NEGATIVE
HBV SURFACE AG SERPL QL IA: NONREACTIVE
HCT VFR BLD AUTO: 37.6 % (ref 40–53)
HCV AB SERPL QL IA: NEGATIVE
HGB BLD-MCNC: 11.1 G/DL (ref 13.3–17.7)
IMM GRANULOCYTES # BLD: 0 10E3/UL
IMM GRANULOCYTES NFR BLD: 1 %
LIPASE SERPL-CCNC: 27 U/L (ref 0–52)
LYMPHOCYTES # BLD AUTO: 1.1 10E3/UL (ref 0.8–5.3)
LYMPHOCYTES NFR BLD AUTO: 12 %
MCH RBC QN AUTO: 18.6 PG (ref 26.5–33)
MCHC RBC AUTO-ENTMCNC: 29.5 G/DL (ref 31.5–36.5)
MCV RBC AUTO: 63 FL (ref 78–100)
MONOCYTES # BLD AUTO: 0.5 10E3/UL (ref 0–1.3)
MONOCYTES NFR BLD AUTO: 5 %
NEUTROPHILS # BLD AUTO: 7.2 10E3/UL (ref 1.6–8.3)
NEUTROPHILS NFR BLD AUTO: 82 %
NRBC # BLD AUTO: 0 10E3/UL
NRBC BLD AUTO-RTO: 0 /100
PLATELET # BLD AUTO: 219 10E3/UL (ref 150–450)
POTASSIUM BLD-SCNC: 4.4 MMOL/L (ref 3.5–5)
PROT SERPL-MCNC: 7.1 G/DL (ref 6–8)
RBC # BLD AUTO: 5.97 10E6/UL (ref 4.4–5.9)
SODIUM SERPL-SCNC: 137 MMOL/L (ref 136–145)
WBC # BLD AUTO: 8.7 10E3/UL (ref 4–11)

## 2022-03-23 PROCEDURE — 83690 ASSAY OF LIPASE: CPT | Performed by: HOSPITALIST

## 2022-03-23 PROCEDURE — 80053 COMPREHEN METABOLIC PANEL: CPT | Performed by: INTERNAL MEDICINE

## 2022-03-23 PROCEDURE — 360N000076 HC SURGERY LEVEL 3, PER MIN: Performed by: SURGERY

## 2022-03-23 PROCEDURE — 258N000003 HC RX IP 258 OP 636: Performed by: ANESTHESIOLOGY

## 2022-03-23 PROCEDURE — 96372 THER/PROPH/DIAG INJ SC/IM: CPT | Performed by: FAMILY MEDICINE

## 2022-03-23 PROCEDURE — 82962 GLUCOSE BLOOD TEST: CPT

## 2022-03-23 PROCEDURE — 250N000012 HC RX MED GY IP 250 OP 636 PS 637: Performed by: FAMILY MEDICINE

## 2022-03-23 PROCEDURE — 0FT44ZZ RESECTION OF GALLBLADDER, PERCUTANEOUS ENDOSCOPIC APPROACH: ICD-10-PCS | Performed by: SURGERY

## 2022-03-23 PROCEDURE — 88304 TISSUE EXAM BY PATHOLOGIST: CPT | Mod: TC | Performed by: SURGERY

## 2022-03-23 PROCEDURE — 99232 SBSQ HOSP IP/OBS MODERATE 35: CPT | Performed by: HOSPITALIST

## 2022-03-23 PROCEDURE — 250N000011 HC RX IP 250 OP 636: Performed by: ANESTHESIOLOGY

## 2022-03-23 PROCEDURE — 272N000001 HC OR GENERAL SUPPLY STERILE: Performed by: SURGERY

## 2022-03-23 PROCEDURE — 85025 COMPLETE CBC W/AUTO DIFF WBC: CPT | Performed by: HOSPITALIST

## 2022-03-23 PROCEDURE — 96376 TX/PRO/DX INJ SAME DRUG ADON: CPT

## 2022-03-23 PROCEDURE — 258N000001 HC RX 258: Performed by: SURGERY

## 2022-03-23 PROCEDURE — 47562 LAPAROSCOPIC CHOLECYSTECTOMY: CPT | Performed by: SURGERY

## 2022-03-23 PROCEDURE — 250N000013 HC RX MED GY IP 250 OP 250 PS 637: Performed by: HOSPITALIST

## 2022-03-23 PROCEDURE — 370N000017 HC ANESTHESIA TECHNICAL FEE, PER MIN: Performed by: SURGERY

## 2022-03-23 PROCEDURE — 36415 COLL VENOUS BLD VENIPUNCTURE: CPT | Performed by: INTERNAL MEDICINE

## 2022-03-23 PROCEDURE — 250N000011 HC RX IP 250 OP 636: Performed by: INTERNAL MEDICINE

## 2022-03-23 PROCEDURE — 250N000009 HC RX 250: Performed by: NURSE ANESTHETIST, CERTIFIED REGISTERED

## 2022-03-23 PROCEDURE — G0378 HOSPITAL OBSERVATION PER HR: HCPCS

## 2022-03-23 PROCEDURE — 710N000009 HC RECOVERY PHASE 1, LEVEL 1, PER MIN: Performed by: SURGERY

## 2022-03-23 PROCEDURE — 250N000011 HC RX IP 250 OP 636: Performed by: NURSE ANESTHETIST, CERTIFIED REGISTERED

## 2022-03-23 PROCEDURE — 999N000141 HC STATISTIC PRE-PROCEDURE NURSING ASSESSMENT: Performed by: SURGERY

## 2022-03-23 PROCEDURE — 258N000003 HC RX IP 258 OP 636: Performed by: INTERNAL MEDICINE

## 2022-03-23 PROCEDURE — 250N000009 HC RX 250: Performed by: SURGERY

## 2022-03-23 PROCEDURE — 120N000001 HC R&B MED SURG/OB

## 2022-03-23 RX ORDER — HYDROCODONE BITARTRATE AND ACETAMINOPHEN 5; 325 MG/1; MG/1
1 TABLET ORAL EVERY 6 HOURS PRN
Qty: 18 TABLET | Refills: 0 | Status: SHIPPED | OUTPATIENT
Start: 2022-03-23 | End: 2022-03-26

## 2022-03-23 RX ORDER — DOCUSATE SODIUM 100 MG/1
100 CAPSULE, LIQUID FILLED ORAL 2 TIMES DAILY
Qty: 30 CAPSULE | Refills: 0 | Status: SHIPPED | OUTPATIENT
Start: 2022-03-23 | End: 2022-09-30

## 2022-03-23 RX ORDER — DEXAMETHASONE SODIUM PHOSPHATE 10 MG/ML
INJECTION, SOLUTION INTRAMUSCULAR; INTRAVENOUS PRN
Status: DISCONTINUED | OUTPATIENT
Start: 2022-03-23 | End: 2022-03-23

## 2022-03-23 RX ORDER — HEPARIN SODIUM 5000 [USP'U]/.5ML
5000 INJECTION, SOLUTION INTRAVENOUS; SUBCUTANEOUS EVERY 8 HOURS
Status: DISCONTINUED | OUTPATIENT
Start: 2022-03-24 | End: 2022-03-24 | Stop reason: HOSPADM

## 2022-03-23 RX ORDER — LIDOCAINE 40 MG/G
CREAM TOPICAL
Status: DISCONTINUED | OUTPATIENT
Start: 2022-03-23 | End: 2022-03-23 | Stop reason: HOSPADM

## 2022-03-23 RX ORDER — SODIUM CHLORIDE, SODIUM LACTATE, POTASSIUM CHLORIDE, AND CALCIUM CHLORIDE .6; .31; .03; .02 G/100ML; G/100ML; G/100ML; G/100ML
IRRIGANT IRRIGATION PRN
Status: DISCONTINUED | OUTPATIENT
Start: 2022-03-23 | End: 2022-03-23 | Stop reason: HOSPADM

## 2022-03-23 RX ORDER — OXYCODONE HYDROCHLORIDE 5 MG/1
5 TABLET ORAL EVERY 4 HOURS PRN
Status: DISCONTINUED | OUTPATIENT
Start: 2022-03-23 | End: 2022-03-23 | Stop reason: HOSPADM

## 2022-03-23 RX ORDER — ONDANSETRON 2 MG/ML
4 INJECTION INTRAMUSCULAR; INTRAVENOUS EVERY 30 MIN PRN
Status: DISCONTINUED | OUTPATIENT
Start: 2022-03-23 | End: 2022-03-23 | Stop reason: HOSPADM

## 2022-03-23 RX ORDER — SODIUM CHLORIDE, SODIUM LACTATE, POTASSIUM CHLORIDE, CALCIUM CHLORIDE 600; 310; 30; 20 MG/100ML; MG/100ML; MG/100ML; MG/100ML
INJECTION, SOLUTION INTRAVENOUS CONTINUOUS
Status: DISCONTINUED | OUTPATIENT
Start: 2022-03-23 | End: 2022-03-23 | Stop reason: HOSPADM

## 2022-03-23 RX ORDER — LIDOCAINE HYDROCHLORIDE 10 MG/ML
INJECTION, SOLUTION INFILTRATION; PERINEURAL PRN
Status: DISCONTINUED | OUTPATIENT
Start: 2022-03-23 | End: 2022-03-23

## 2022-03-23 RX ORDER — HYDROMORPHONE HYDROCHLORIDE 1 MG/ML
0.2 INJECTION, SOLUTION INTRAMUSCULAR; INTRAVENOUS; SUBCUTANEOUS EVERY 5 MIN PRN
Status: DISCONTINUED | OUTPATIENT
Start: 2022-03-23 | End: 2022-03-23 | Stop reason: HOSPADM

## 2022-03-23 RX ORDER — PROPOFOL 10 MG/ML
INJECTION, EMULSION INTRAVENOUS CONTINUOUS PRN
Status: DISCONTINUED | OUTPATIENT
Start: 2022-03-23 | End: 2022-03-23

## 2022-03-23 RX ORDER — LIDOCAINE 40 MG/G
CREAM TOPICAL
Status: DISCONTINUED | OUTPATIENT
Start: 2022-03-23 | End: 2022-03-24 | Stop reason: HOSPADM

## 2022-03-23 RX ORDER — ONDANSETRON 2 MG/ML
INJECTION INTRAMUSCULAR; INTRAVENOUS PRN
Status: DISCONTINUED | OUTPATIENT
Start: 2022-03-23 | End: 2022-03-23

## 2022-03-23 RX ORDER — CEFAZOLIN SODIUM/WATER 2 G/20 ML
2 SYRINGE (ML) INTRAVENOUS
Status: DISCONTINUED | OUTPATIENT
Start: 2022-03-23 | End: 2022-03-23 | Stop reason: HOSPADM

## 2022-03-23 RX ORDER — ONDANSETRON 4 MG/1
4 TABLET, ORALLY DISINTEGRATING ORAL EVERY 30 MIN PRN
Status: DISCONTINUED | OUTPATIENT
Start: 2022-03-23 | End: 2022-03-23 | Stop reason: HOSPADM

## 2022-03-23 RX ORDER — LIDOCAINE HYDROCHLORIDE AND EPINEPHRINE 10; 10 MG/ML; UG/ML
INJECTION, SOLUTION INFILTRATION; PERINEURAL PRN
Status: DISCONTINUED | OUTPATIENT
Start: 2022-03-23 | End: 2022-03-23 | Stop reason: HOSPADM

## 2022-03-23 RX ORDER — INDOCYANINE GREEN AND WATER 25 MG
2.5 KIT INJECTION ONCE
Status: COMPLETED | OUTPATIENT
Start: 2022-03-23 | End: 2022-03-23

## 2022-03-23 RX ORDER — HYDROMORPHONE HYDROCHLORIDE 1 MG/ML
0.5 INJECTION, SOLUTION INTRAMUSCULAR; INTRAVENOUS; SUBCUTANEOUS EVERY 6 HOURS PRN
Status: DISCONTINUED | OUTPATIENT
Start: 2022-03-23 | End: 2022-03-24 | Stop reason: HOSPADM

## 2022-03-23 RX ORDER — FENTANYL CITRATE 50 UG/ML
INJECTION, SOLUTION INTRAMUSCULAR; INTRAVENOUS PRN
Status: DISCONTINUED | OUTPATIENT
Start: 2022-03-23 | End: 2022-03-23

## 2022-03-23 RX ORDER — CEFAZOLIN SODIUM/WATER 2 G/20 ML
2 SYRINGE (ML) INTRAVENOUS SEE ADMIN INSTRUCTIONS
Status: DISCONTINUED | OUTPATIENT
Start: 2022-03-23 | End: 2022-03-23 | Stop reason: HOSPADM

## 2022-03-23 RX ORDER — FENTANYL CITRATE 50 UG/ML
25 INJECTION, SOLUTION INTRAMUSCULAR; INTRAVENOUS EVERY 5 MIN PRN
Status: DISCONTINUED | OUTPATIENT
Start: 2022-03-23 | End: 2022-03-23 | Stop reason: HOSPADM

## 2022-03-23 RX ORDER — MAGNESIUM SULFATE 4 G/50ML
4 INJECTION INTRAVENOUS ONCE
Status: COMPLETED | OUTPATIENT
Start: 2022-03-23 | End: 2022-03-23

## 2022-03-23 RX ORDER — PROPOFOL 10 MG/ML
INJECTION, EMULSION INTRAVENOUS PRN
Status: DISCONTINUED | OUTPATIENT
Start: 2022-03-23 | End: 2022-03-23

## 2022-03-23 RX ORDER — HYDROCODONE BITARTRATE AND ACETAMINOPHEN 5; 325 MG/1; MG/1
1 TABLET ORAL EVERY 6 HOURS PRN
Status: DISCONTINUED | OUTPATIENT
Start: 2022-03-23 | End: 2022-03-24 | Stop reason: HOSPADM

## 2022-03-23 RX ADMIN — SODIUM CHLORIDE: 9 INJECTION, SOLUTION INTRAVENOUS at 22:27

## 2022-03-23 RX ADMIN — PIPERACILLIN SODIUM AND TAZOBACTAM SODIUM 3.38 G: 3; .375 INJECTION, POWDER, LYOPHILIZED, FOR SOLUTION INTRAVENOUS at 01:40

## 2022-03-23 RX ADMIN — INDOCYANINE GREEN AND WATER 2.5 MG: KIT at 06:52

## 2022-03-23 RX ADMIN — HYDROMORPHONE HYDROCHLORIDE 0.2 MG: 1 INJECTION, SOLUTION INTRAMUSCULAR; INTRAVENOUS; SUBCUTANEOUS at 09:52

## 2022-03-23 RX ADMIN — INSULIN ASPART 3 UNITS: 100 INJECTION, SOLUTION INTRAVENOUS; SUBCUTANEOUS at 07:37

## 2022-03-23 RX ADMIN — Medication 40 MG: at 17:10

## 2022-03-23 RX ADMIN — INSULIN ASPART 4 UNITS: 100 INJECTION, SOLUTION INTRAVENOUS; SUBCUTANEOUS at 17:13

## 2022-03-23 RX ADMIN — FENTANYL CITRATE 25 MCG: 50 INJECTION INTRAMUSCULAR; INTRAVENOUS at 09:34

## 2022-03-23 RX ADMIN — MAGNESIUM SULFATE HEPTAHYDRATE 4 G: 80 INJECTION, SOLUTION INTRAVENOUS at 07:11

## 2022-03-23 RX ADMIN — ROCURONIUM BROMIDE 50 MG: 50 INJECTION, SOLUTION INTRAVENOUS at 07:50

## 2022-03-23 RX ADMIN — PROPOFOL 150 MCG/KG/MIN: 10 INJECTION, EMULSION INTRAVENOUS at 07:51

## 2022-03-23 RX ADMIN — FENTANYL CITRATE 25 MCG: 50 INJECTION INTRAMUSCULAR; INTRAVENOUS at 09:16

## 2022-03-23 RX ADMIN — FENTANYL CITRATE 100 MCG: 50 INJECTION, SOLUTION INTRAMUSCULAR; INTRAVENOUS at 07:50

## 2022-03-23 RX ADMIN — PIPERACILLIN SODIUM AND TAZOBACTAM SODIUM 3.38 G: 3; .375 INJECTION, POWDER, LYOPHILIZED, FOR SOLUTION INTRAVENOUS at 19:00

## 2022-03-23 RX ADMIN — MIDAZOLAM 2 MG: 1 INJECTION INTRAMUSCULAR; INTRAVENOUS at 07:45

## 2022-03-23 RX ADMIN — LIDOCAINE HYDROCHLORIDE 40 MG: 10 INJECTION, SOLUTION INFILTRATION; PERINEURAL at 07:50

## 2022-03-23 RX ADMIN — SODIUM CHLORIDE, POTASSIUM CHLORIDE, SODIUM LACTATE AND CALCIUM CHLORIDE: 600; 310; 30; 20 INJECTION, SOLUTION INTRAVENOUS at 06:51

## 2022-03-23 RX ADMIN — HYDROMORPHONE HYDROCHLORIDE 0.2 MG: 1 INJECTION, SOLUTION INTRAMUSCULAR; INTRAVENOUS; SUBCUTANEOUS at 10:14

## 2022-03-23 RX ADMIN — INSULIN ASPART 4 UNITS: 100 INJECTION, SOLUTION INTRAVENOUS; SUBCUTANEOUS at 12:25

## 2022-03-23 RX ADMIN — SUGAMMADEX 200 MG: 100 INJECTION, SOLUTION INTRAVENOUS at 08:44

## 2022-03-23 RX ADMIN — ONDANSETRON 4 MG: 2 INJECTION INTRAMUSCULAR; INTRAVENOUS at 08:33

## 2022-03-23 RX ADMIN — DEXAMETHASONE SODIUM PHOSPHATE 10 MG: 10 INJECTION, SOLUTION INTRAMUSCULAR; INTRAVENOUS at 07:50

## 2022-03-23 RX ADMIN — PROPOFOL 150 MG: 10 INJECTION, EMULSION INTRAVENOUS at 07:50

## 2022-03-23 RX ADMIN — INSULIN ASPART 3 UNITS: 100 INJECTION, SOLUTION INTRAVENOUS; SUBCUTANEOUS at 09:26

## 2022-03-23 ASSESSMENT — ACTIVITIES OF DAILY LIVING (ADL)
ADLS_ACUITY_SCORE: 8
ADLS_ACUITY_SCORE: 4
ADLS_ACUITY_SCORE: 8
ADLS_ACUITY_SCORE: 8
ADLS_ACUITY_SCORE: 4

## 2022-03-23 ASSESSMENT — ENCOUNTER SYMPTOMS: SEIZURES: 0

## 2022-03-23 NOTE — OP NOTE
[facility]    Operative Note    Pre-operative diagnosis: Acute cholecystitis [K81.0]  Elevated liver enzymes [R74.8]  Post-operative diagnosis Same as pre-operative diagnosis    Procedure: Procedure(s):  CHOLECYSTECTOMY, LAPAROSCOPIC  Surgeon: Surgeon(s) and Role:     * Branden Ponce DO - Primary  Anesthesia: General   Estimated Blood Loss: 5 mL    Drains: None  Specimens:   ID Type Source Tests Collected by Time Destination   1 : Gallbladder and stones Tissue Gallbladder with Stone(s) SURGICAL PATHOLOGY EXAM Branden Ponce DO 3/23/2022  8:40 AM      Findings:     - distended gallbladder with multiple gallstones    Complications: None.  Implants: * No implants in log *      Indication: 61-year-old male presents with abdominal pain and transaminitis.  Patient underwent an ERCP showing sludge in the common bile duct causing transaminitis.  Patient was eval by general surgery team.  I offered the patient a procedure laparoscopic cholecystectomy, possible open. The risks and benefits of the procedure were explained detail to the patient. The risks include infection, bleeding, damage to the surrounding structures. Patient verbalized understanding provided consent to undergo the procedure above.    Procedure: Patient was brought back to the operating room and was placed on the operating table in the supine position.  The patient's extremities were padded and positioned in the usual fashion.  The patient then underwent anesthesia sedation and intubation.  The patient's abdomen was prepped and draped in the usual sterile fashion.  Prior to initiating the procedure, a timeout was completed.  All present were in agreement.    1% lidocaine with epinephrine was instilled in Murillo's point.  An 11 blade was then used to make a skin nick at the Murillo's point in the left upper quadrant.  The Veress needle was inserted into the abdomen and insufflation was initiated. A 5 mm transverse infraumbilical  incision.  Dissection was carried down to the fascia using a combination of blunt dissection along with electrocautery. A 5 mm trocar was inserted at the infraumbilical port site.  Insufflation was then initiated.  Once insufflation was completed, a general survey was completed.  I could identify that the patient had a distended gallbladder.     A 12 mm port was placed in the epigastric region.  Two 5 mm ports were placed in the right upper quadrant.  These ports were placed under direct visualization.  The gallbladder was then grasped with gator graspers and retracted cephalad.  The cystic duct and cystic artery were then carefully dissected and isolated with a combination of blunt dissection with the [place] graspers as well as electrocautery.  Once the cystic artery and cystic duct were isolated the critical view was obtained.  An Endo Clip was used to clip across the cystic artery and duct.  Endoscopic fatimah were then used to transect across the cystic duct and cystic artery.  The gallbladder was removed off of the liver bed using electrocautery.  The gallbladder was then removed from the abdomen using an Endo Catch bag through the 12 mm epigastric port site.      Inspection of the liver bed revealed good hemostasis.  The fascia of the 12 mm port site was then closed using an 0 Vicryl suture with a suture passer.  A 3-0 Vicryl suture was used to perform deep dermal sutures at the 12 mm port site.  All surgical sites were then closed with a 4-0 Vicryl suture in a subcuticular fashion.  Surgical glue was used to reinforce all surgical skin incisions.  At the end of the procedure, a final count was completed.  I was told all sharps, sponges, instruments were accounted for.  The patient tolerated the procedure with no complications.  The patient was then extubated and brought back to the PACU in stable condition.

## 2022-03-23 NOTE — PLAN OF CARE
PRIMARY DIAGNOSIS: BILIARY COLIC/UNCOMPLICATED EARLY ACUTE CHOLECYSTITIS  OUTPATIENT/OBSERVATION GOALS TO BE MET BEFORE DISCHARGE:    1. Pain status: Pain free.  2. Stable vital signs and labs (if performed) at disposition: Yes  3. Tolerating adequate PO diet: No. NPO    4. Successful cholecystectomy or clear follow up plan with General Surgery team if immediate surgery not performed Yes. Plan for surgery    5. ADLs back to baseline?  Yes  6. Activity and level of assistance: Ambulating independently.  7. Barriers to discharge noted Yes. Pending surgery      Discharge Planner Nurse   Safe discharge environment identified: Yes  Barriers to discharge: Yes       Entered by: Emily Alcantara 03/23/2022 1:21 AM     Please review provider order for any additional goals.   Nurse to notify provider when observation goals have been met and patient is ready for discharge.Goal Outcome Evaluation:

## 2022-03-23 NOTE — ANESTHESIA PROCEDURE NOTES
Airway       Patient location during procedure: OR       Procedure Start/Stop Times: 3/23/2022 7:52 AM  Staff -        Anesthesiologist:  Magaly Chung MD       CRNA: Sammi Lagunas APRN CRNA       Performed By: anesthesiologist and CRNAIndications and Patient Condition       Indications for airway management: tawanda-procedural       Induction type:intravenous       Mask difficulty assessment: 1 - vent by mask    Final Airway Details       Final airway type: endotracheal airway       Successful airway: ETT - single  Endotracheal Airway Details        ETT size (mm): 7.5       Cuffed: yes       Cuff volume (mL): 7       Successful intubation technique: direct laryngoscopy       DL Blade Type: Sandoval 2       Grade View of Cords: 1       Adjucts: stylet and tooth guard       Position: Right       Measured from: gums/teeth       Secured at (cm): 23       Bite block used: Soft    Post intubation assessment        Placement verified by: capnometry, equal breath sounds and chest rise        Number of attempts at approach: 1       Number of other approaches attempted: 0       Secured with: silk tape       Ease of procedure: easy       Dentition: Intact and Unchanged (preop teeth front teeth uneven checked Dr CHELSIE/ROMERO)

## 2022-03-23 NOTE — ANESTHESIA CARE TRANSFER NOTE
Patient: Farhat Tejada    Procedure: Procedure(s):  CHOLECYSTECTOMY, LAPAROSCOPIC       Diagnosis: Acute cholecystitis [K81.0]  Elevated liver enzymes [R74.8]  Diagnosis Additional Information: No value filed.    Anesthesia Type:   General     Note:    Oropharynx: oropharynx clear of all foreign objects  Level of Consciousness: awake  Oxygen Supplementation: face mask  Level of Supplemental Oxygen (L/min / FiO2): 6  Independent Airway: airway patency satisfactory and stable  Dentition: dentition unchanged  Vital Signs Stable: post-procedure vital signs reviewed and stable  Report to RN Given: handoff report given  Patient transferred to: PACU    Handoff Report: Identifed the Patient, Identified the Reponsible Provider, Reviewed the pertinent medical history, Discussed the surgical course, Reviewed Intra-OP anesthesia mangement and issues during anesthesia, Set expectations for post-procedure period and Allowed opportunity for questions and acknowledgement of understanding      Vitals:  Vitals Value Taken Time   /78 03/23/22 0901   Temp 37.2  C (98.9  F) 03/23/22 0900   Pulse 81 03/23/22 0903   Resp 17 03/23/22 0903   SpO2 100 % 03/23/22 0903   Vitals shown include unvalidated device data.    Electronically Signed By: KEENAN Young CRNA  March 23, 2022  9:04 AM

## 2022-03-23 NOTE — PROGRESS NOTES
"General Surgery Progress Note:    Hospital Day # 0    ASSESSMENT:   1. Elevated liver enzymes    2. Acute cholecystitis    3. Nausea    4. Abnormal LFTs        Farhat Tejada is a 61 year old male s/p ERCP with findings for sludge in the common bile duct causing the transaminitis.  Patient also with acute cholecystitis.    PLAN:   -To the OR today for laparoscopic cholecystectomy, possible open  -Continue n.p.o. and IV fluid resuscitation  -If the patient has an uncomplicated course of surgery, the patient can be discharged when medically stable.  Pain prescriptions and follow-up information has been completed.    SUBJECTIVE:   Farhat Tejada seen this morning.  Patient is ready for surgery.  Patient underwent ERCP with sludge finding in the common bile duct yesterday.  Patient states that when he ate after the ERCP yesterday he still has some mild pain in the right upper quadrant.  No nausea no vomiting.  Patient has no new complaints today.    Patient Vitals for the past 24 hrs:   BP Temp Temp src Pulse Resp SpO2 Height Weight   03/23/22 0600 129/77 97.5  F (36.4  C) Oral 80 16 95 % -- --   03/22/22 2301 136/79 98.1  F (36.7  C) Oral 75 16 95 % -- --   03/22/22 1909 136/79 98.2  F (36.8  C) Oral 86 19 93 % -- --   03/22/22 1731 (!) 148/74 99.5  F (37.5  C) Oral 97 14 94 % 1.803 m (5' 11\") 92.1 kg (203 lb)   03/22/22 1715 133/77 100.4  F (38  C) Temporal 96 15 100 % -- --   03/22/22 1700 124/71 -- -- 97 16 100 % -- --   03/22/22 1645 132/82 -- -- 84 16 -- -- --   03/22/22 1631 133/84 97.1  F (36.2  C) Temporal 98 16 100 % -- --   03/22/22 1309 -- -- -- -- -- -- 1.803 m (5' 11\") --   03/22/22 1255 132/74 98.5  F (36.9  C) Oral 84 16 98 % -- --   03/22/22 1229 118/67 -- -- 85 -- 97 % -- --   03/22/22 1115 -- -- -- 87 -- 94 % -- --   03/22/22 0948 -- 99.7  F (37.6  C) -- -- -- -- -- --   03/22/22 0900 -- -- -- 86 -- 97 % -- --   03/22/22 0847 -- 99.4  F (37.4  C) Oral 83 -- 95 % -- --   03/22/22 0720 -- -- -- 86 -- " 95 % -- --       Physical Exam:  General: NAD, pleasant  CV:RRR  LUNGS:Normal respiratory effort, no accessory muscle use, breath sounds bilaterally on auscultation  ABD: mild TTP in the RUQ  EXT:no CCE    Admission on 03/21/2022   Component Date Value     Bilirubin Total 03/21/2022 3.4 (A)     Bilirubin Direct 03/21/2022 1.1 (A)     Protein Total 03/21/2022 8.0      Albumin 03/21/2022 4.3      Alkaline Phosphatase 03/21/2022 120      AST 03/21/2022 336 (A)     ALT 03/21/2022 306 (A)     WBC Count 03/21/2022 10.0      RBC Count 03/21/2022 6.83 (A)     Hemoglobin 03/21/2022 12.7 (A)     Hematocrit 03/21/2022 42.6      MCV 03/21/2022 62 (A)     MCH 03/21/2022 18.6 (A)     MCHC 03/21/2022 29.8 (A)     RDW 03/21/2022 17.7 (A)     Platelet Count 03/21/2022 256      SARS CoV2 PCR 03/22/2022 Negative      GLUCOSE BY METER POCT 03/22/2022 238 (A)     GLUCOSE BY METER POCT 03/22/2022 221 (A)     GLUCOSE BY METER POCT 03/22/2022 193 (A)     GLUCOSE BY METER POCT 03/22/2022 229 (A)     ERCP 03/22/2022                      Value:[facility]  [address], [place] [address]  _______________________________________________________________________________  Patient Name: Renny Tejada           Procedure Date: 3/22/2022 2:57 PM  MRN: [id number]                       Account Number: [id number]  YOB: 1960                Age: 61  Attending MD: RENNY YORK MD      _______________________________________________________________________________     THIS EXAM WAS SENT IN ERROR       Upper EUS 03/22/2022                      Value:[facility]  [address] [place] [address]  _______________________________________________________________________________  Patient Name: Renny Terrell           Procedure Date: 3/22/2022 3:13 PM  MRN: [id number]                       Account Number: [id number]  YOB: 1960               Admit Type: Inpatient  Age: 61                                 Note Status: Finalized  Attending MD: RENNY YORK MD   Instrument Name: EUS Linear Scope 3560  _______________________________________________________________________________     Procedure:             Upper EUS  Indications:           Elevated liver enzymes  Providers:             RENNY YORK MD  Patient Profile:       This is a 61 year old male.  Referring MD:            Medicines:             General Anesthesia  Complications:         No immediate complications.  _______________________________________________________________________________  Procedure:             Pre-Anesthesia Asse                          ssment:                         - Prior to the procedure, a History and Physical was                          performed, and patient medications, allergies and                          sensitivities were reviewed. The patient's tolerance                          of previous anesthesia was reviewed.                         After obtaining informed consent, the endoscope was                          passed under direct vision. Throughout the procedure,                          the patient's blood pressure, pulse, and oxygen                          saturations were monitored continuously. The EUS                          linear scope was introduced through the mouth, and                          advanced to the second part of duodenum. The upper EUS                          was accomplished without difficulty. The patient                          tolerated the procedure well.                                                                                   Findings:       ENDOSONOGRAPHIC                           FINDING: :       Many stones were visualized endosonographically in the gallbladder body.        The stones measured up to 10 mm in greatest dimension. The stones were        round. They were characterized by shadowing.       A small amount of hyperechoic material  consistent with sludge was        visualized endosonographically in the common bile duct.                                                                                   Moderate Sedation:       General Anesthesia  Impression:            - Many stones were visualized endosonographically in                          the gallbladder body.                         - Hyperechoic material consistent with sludge was                          visualized endosonographically in the common bile duct.                         - No specimens collected.  Recommendation:        - Perform an ERCP today to clear the bile duct.                         - Lap heather in the AM.                                                                                                               Renny Rodriguez MD  ____________________  RENNY RODRIGUEZ MD  3/22/2022 4:09:59 PM  I was physically present for the entire viewing portion of the exam.  __________________________  Signature of teaching physician  Ottoniel/Lina RODRIGUEZ MD  Number of Addenda: 0    Note Initiated On: 3/22/2022 3:13 PM  Scope In: 3:39:35 PM  Scope Out: 3:43:23 PM       ERCP 03/22/2022                      Value:[facility]  [address] [facility] [place] [address]  _______________________________________________________________________________  Patient Name: Renny Tejada           Procedure Date: 3/22/2022 3:13 PM  MRN: [id number]                       Account Number: [id number]  YOB: 1960               Admit Type: Inpatient  Age: 61                                Note Status: Finalized  Attending MD: RENNY RODRIGUEZ MD   Instrument Name: ERCP Scope [id number]  _______________________________________________________________________________     Procedure:             ERCP  Indications:           Bile duct stone(s)  Providers:             RENNY RODRIGUEZ MD  Patient Profile:       This is a 61 year old male.  Referring MD:            Medicines:              General Anesthesia  Complications:         No immediate complications.  _______________________________________________________________________________  Procedure:             Pre-Anesthesia Assessment:                                                   - Prior to the procedure, a History and Physical was                          performed, and patient medications, allergies and                          sensitivities were reviewed. The patient's tolerance                          of previous anesthesia was reviewed.                         After obtaining informed consent, the scope was passed                          under direct vision. Throughout the procedure, the                          patient's blood pressure, pulse, and oxygen                          saturations were monitored continuously. The ERCP                          scope was introduced through the mouth, and used to                          inject contrast into and used to inject contrast into                          the bile duct. The ERCP was accomplished without                          difficulty. The patient tolerated the procedure well.                                                                                   Findings:       The                            film was normal. The esophagus was successfully intubated        under direct vision. The scope was advanced to a normal major papilla in        the descending duodenum without detailed examination of the pharynx,        larynx and associated structures, and upper GI tract. The upper GI tract        was grossly normal. The bile duct was deeply cannulated. Contrast was        injected. The lower third of the main bile duct contained filling        defect(s) thought to be sludge. A wire was passed into the biliary tree.        A 7 mm biliary sphincterotomy was made with a traction (standard)        sphincterotome using ERBE electrocautery. There was no         post-sphincterotomy bleeding. The biliary tree was swept with an 8.5 mm        balloon starting at the bifurcation. Sludge was swept from the duct.                                                                                   Moderate Sedation:       General Anesthesia  Impression:            - The examination was suspicious fo                          r sludge.                         - A biliary sphincterotomy was performed.                         - The biliary tree was swept and sludge was found.  Recommendation:        - [facility] patient to [facility] for [facility] care.                         - Discharge the patient to home.                         - No anticoagulation for 72 hours.                         - Clear liquid diet for 24 hours.                         - If pain free after 24 hours advance diet slowly as                          tolerated.                                                                                     Renny Rodriguez MD  ____________________  RENNY RODRIGUEZ MD  3/22/2022 4:12:38 PM  I was physically present for the entire viewing portion of the exam.  __________________________  Signature of teaching physician  [id number] RODRIGUEZ MD  Number of Addenda: 0    Note Initiated On: 3/22/2022 3:13 PM  Scope In: 3:54:58 PM  Scope Out: 4:07:50 PM       GLUCOSE BY METER POCT 03/22/2022 244 (A)     Sodium 03/23/2022 137      Potassium 03/23/2022 4.4      Chloride 03/23/2022 104      Carbon Dioxide (CO2) 03/23/2022 23      Anion Gap 03/23/2022 10      Urea Nitrogen 03/23/2022 12      Creatinine 03/23/2022 1.13      Calcium 03/23/2022 9.2      Glucose 03/23/2022 251 (A)     Alkaline Phosphatase 03/23/2022 121 (A)     AST 03/23/2022 549 (A)     ALT 03/23/2022 719 (A)     Protein Total 03/23/2022 7.1      Albumin 03/23/2022 3.4 (A)     Bilirubin Total 03/23/2022 7.2 (A)     GFR Estimate 03/23/2022 73      WBC Count 03/23/2022 8.7      RBC Count 03/23/2022 5.97 (A)     Hemoglobin  03/23/2022 11.1 (A)     Hematocrit 03/23/2022 37.6 (A)     MCV 03/23/2022 63 (A)     MCH 03/23/2022 18.6 (A)     MCHC 03/23/2022 29.5 (A)     RDW 03/23/2022 16.2 (A)     Platelet Count 03/23/2022 219      % Neutrophils 03/23/2022 82      % Lymphocytes 03/23/2022 12      % Monocytes 03/23/2022 5      % Eosinophils 03/23/2022 0      % Basophils 03/23/2022 0      % Immature Granulocytes 03/23/2022 1      NRBCs per 100 WBC 03/23/2022 0      Absolute Neutrophils 03/23/2022 7.2      Absolute Lymphocytes 03/23/2022 1.1      Absolute Monocytes 03/23/2022 0.5      Absolute Eosinophils 03/23/2022 0.0      Absolute Basophils 03/23/2022 0.0      Absolute Immature Granul* 03/23/2022 0.0      Absolute NRBCs 03/23/2022 0.0         DO Branden Leiva DO  General Surgeon  [facility]  Surgery [address]  [place] [address]?  Office: [phone]  Employed by - [facility]  Pager: [phone]

## 2022-03-23 NOTE — PROGRESS NOTES
"GI PROGRESS NOTE  3/23/2022  Farhat Tejada  1960  /-96    Subjective:   Patient stating some incisional pain but otherwise denies any abdominal pain. Denies any nausea or vomiting. States feeling hungry and overall feeling well post surgery. Has not eaten anything yet today but expecting to start on clear liquids soon.      Objective:     Blood pressure 126/63, pulse 84, temperature 98.5  F (36.9  C), temperature source Oral, resp. rate 16, height 1.803 m (5' 11\"), weight 92.1 kg (203 lb), SpO2 93 %.    Body mass index is 28.31 kg/m .  Gen: NAD  Cardio: RRR  GI: Non-distended, BS positive, soft, non-tender. Mild tenderness only over surgical sites.    Laboratory:    BMP  Recent Labs   Lab 03/23/22  0904 03/23/22  0600 03/22/22  1749 03/22/22  0335 03/21/22  0740   NA  --  137  --   --  135*   POTASSIUM  --  4.4  --   --  4.3   CHLORIDE  --  104  --   --  100   KERRI  --  9.2  --   --  9.6   CO2  --  23  --   --  23   BUN  --  12  --   --  12   CR  --  1.13  --   --  1.17   * 251* 244*   < > 260*    < > = values in this interval not displayed.     CBC  Recent Labs   Lab 03/23/22  0600 03/21/22  2135 03/21/22  0740   WBC 8.7 10.0 7.0   RBC 5.97* 6.83* 6.55*   HGB 11.1* 12.7* 12.1*   HCT 37.6* 42.6 41.0   MCV 63* 62* 63*   MCH 18.6* 18.6* 18.5*   MCHC 29.5* 29.8* 29.5*   RDW 16.2* 17.7* 17.5*    256 251     INRNo lab results found in last 7 days.     LFTs  Recent Labs   Lab Test 03/23/22  0600 03/21/22  2135 03/21/22  0740 03/18/22  1403   ALBUMIN 3.4* 4.3 4.2  --    BILITOTAL 7.2* 3.4* 2.0*  --    * 306* 131*  --    * 336* 135*  --    PROTEIN  --   --   --  Negative   LIPASE 27  --  51  --        Assessment:   1. Acute cholecystitis  -Patient is a 61 year old male S/p ERCP 3/22/22 with sludge seen in CBD and S/p cholecystomy today (3/23/22) with LFT elevation post surgery. Lipase within normal limits and patient without pain or associated symptoms. Will trend LFTs and follow " closely. If LFTs continue to rise will consider further imaging.       Plan:   - Trend LFTs  - Diet per surgery                                                                           KEENAN Cotton CNP  Thank you for the opportunity to participate in the care of this patient.   Please feel free to call me with any questions or concerns.  Phone number [phone].

## 2022-03-23 NOTE — DISCHARGE INSTRUCTIONS
Follow up: It is our practice to have all patients follow up with us 2-3 weeks after their surgery to ensure they are recovering well.  For straightforwardlaparoscopic procedures, this can be done either in clinic as a scheduled follow up appointment, or over the phone.  If you would like a scheduled follow up appointment in clinic, please call us at [phone] to schedule an appointment at your convenience.  If you would prefer to follow up with us by phone please let us know so that we may contact you 2-3 weeks following your procedure.         Diet: Regular diet. Patients can have difficulty with constipation following surgery, due in part to the administration of narcotic medications.  If you are suffering with constipation, you should avoid foods such as hard cheeses or red meat.  Foods high infiber are recommended.       Activity: You should continue to be active at home, including ambulating frequently.  If possible try to limit the amount of time spent in bed.     Restrictions: You have no liftingrestrictions post operatively, but may wish to avoid strenuous physical activity for 1-2 weeks.  You should limit your physical activity if it causes you discomfort; however, this should resolve within 1-2 weeks.   Walking does not count as strenuous physical activity.  You are safe to walk up and down stairs.  Following 2 weeks you may resume all normal physical activity.     Wound / drain care: Your incisions are closed using absorbale sutures.  The skin is sealed with a surgical glue.  Do not peal the glue off.  Please allow the glue to peal off on its own.      It is normal to have a smallrim of red present around the incisions. This should not, however, extend beyond 1/4 inch from the incision.  If your incisions become increasingly tender, red, or draining, please contact us.        Bathing: Youmay shower after 24 hours from surgery.  It is ok to get your incisions wet, but avoid rubbing them.  Avoid  soaking in bath tubs, or swimming in lakes, pools, or streams for 4 weeks following surgery.          Discharge Instructions for Laparoscopic Cholecystectomy   You have had a procedure called a laparoscopic cholecystectomy. This is a surgery to remove your gallbladder. People who have this procedure often recover more quickly and have less pain than with open gallbladder surgery (called open cholecystectomy). Many surgeons advise a low-fat diet, staying away from fried food in particular, for the first month after surgery.    You can live a full and healthy life without your gallbladder. This includes eating the foods and doing the things you enjoyed before your gallbladder problems started.   Home care  Recommendations for home care include the following:      Ask someone to drive you to your appointments for the next 3 days. Don t drive until you are no longer taking pain medicine and can step on the brake pedal without hesitation.     Wash the skin around your cut (incision) daily with mild soap and water. It's OK to shower the day after your surgery.    Eat your regular diet. Try to stay away from rich, greasy, or spicy food for a few days.    Remember, it takes at least 1 week for you to get most of your strength and energy back.    If you are constipated, talk about a bowel regimen with your provider. Pain medicines can be constipating. Increased fiber and a stool softener are often helpful.    Make an office visit to talk with your healthcare provider if these symptoms don t go away in a week after your surgery:  ? Extreme tiredness (fatigue)  ? Pain around the incision  ? Diarrhea or constipation  ? Loss of appetite  When to call your healthcare provider  Call your healthcare provider right away if you have any of the following:     Yellowing of your eyes or skin (jaundice)    Chills    Fever of 100.4 F (38.0 C) or higher, or as directed by your provider     Redness, swelling, increasing pain, pus, or a bad  smell at the incision site    Dark or rust-colored urine    Stool that is zenon-colored or light in color instead of brown    Increasing belly pain    Rectal bleeding    Trouble breathing or shortness of breath    Leg swelling  Jaz last reviewed this educational content on 6/1/2019 2000-2021 [employer]. All rights reserved. This information is not intended as a substitute for professional medical care. Always follow your healthcare professional's instructions.    DIABETES REMINDERS:  1) Check your blood sugar 1-2 times daily. Test first thing in the am and 2 hours after any meal of the day. Goals are listed in point  Always bring your blood sugar log and meter to your diabetes-related appointments.  2) Your blood sugar goals:  80-130mg/dL before eating  and  mg/dL 2 hours after eating (or per your doctor).  3) Always be prepared to treat a low blood sugar should it happen. Keep a sugar-containing beverage or food nearby.  4) When to call your clinic:     Blood sugar over 400 mg/dL.     If you have 2 to 3 low blood sugars (under 70mg/dL) in a row,     Low reading the same time of day several days in a row,    Blood sugars elevated and you can not get them down with your usual diabetes regimen,    You are ill and can't keep blood sugars controlled.   6) When to call 911:  If your blood glucose does not get better with treatment, or if you/someone else is unable to give you treatment.  7) Talk to your primary care doctor about an appointment with a Certified Diabetes Educator to assist you with your BG management.  8.  Discuss increasing metformin if tolerated up to maximum dose to help with blood glucose control.

## 2022-03-23 NOTE — ANESTHESIA PREPROCEDURE EVALUATION
Anesthesia Pre-Procedure Evaluation    Patient: Farhat Tejada   MRN: [id number] : 1960        Procedure : Procedure(s):  CHOLECYSTECTOMY, LAPAROSCOPIC          Past Medical History:   Diagnosis Date     Diabetes (H)      HLD (hyperlipidemia)      Obesity       Past Surgical History:   Procedure Laterality Date     VASECTOMY       WISDOM ST GUIDEWIRE        No Known Allergies   Social History     Tobacco Use     Smoking status: Never Smoker     Smokeless tobacco: Never Used   Substance Use Topics     Alcohol use: Yes      Wt Readings from Last 1 Encounters:   22 92.1 kg (203 lb)        Anesthesia Evaluation   Pt has had prior anesthetic. Type: General.        ROS/MED HX  ENT/Pulmonary:     (+) sleep apnea, doesn't use CPAP,     Neurologic:  - neg neurologic ROS  (-) no seizures, no CVA and no TIA   Cardiovascular:     (+) Dyslipidemia -----    METS/Exercise Tolerance: >4 METS    Hematologic:       Musculoskeletal:   (+) arthritis,     GI/Hepatic:     (+) GERD, Asymptomatic on medication, cholecystitis/cholelithiasis, liver disease,     Renal/Genitourinary:       Endo:     (+) type II DM, Not using insulin, Obesity,     Psychiatric/Substance Use:       Infectious Disease:  - neg infectious disease ROS     Malignancy:  - neg malignancy ROS     Other:  - neg other ROS          Physical Exam    Airway  airway exam normal      Mallampati: II   TM distance: > 3 FB   Neck ROM: full   Mouth opening: > 3 cm    Respiratory Devices and Support         Dental  no notable dental history         Cardiovascular   cardiovascular exam normal       Rhythm and rate: regular and normal     Pulmonary   pulmonary exam normal        breath sounds clear to auscultation           OUTSIDE LABS:  CBC:   Lab Results   Component Value Date    WBC 10.0 2022    WBC 7.0 2022    HGB 12.7 (L) 2022    HGB 12.1 (L) 2022    HCT 42.6 2022    HCT 41.0 2022     2022     2022      BMP:   Lab Results   Component Value Date     (L) 03/21/2022     01/10/2020    POTASSIUM 4.3 03/21/2022    POTASSIUM 4.6 01/10/2020    CHLORIDE 100 03/21/2022    CHLORIDE 104 01/10/2020    CO2 23 03/21/2022    CO2 28 01/10/2020    BUN 12 03/21/2022    BUN 10 01/10/2020    CR 1.17 03/21/2022    CR 1.02 01/10/2020     (H) 03/22/2022     (H) 03/22/2022     COAGS: No results found for: PTT, INR, FIBR  POC: No results found for: BGM, HCG, HCGS  HEPATIC:   Lab Results   Component Value Date    ALBUMIN 4.3 03/21/2022    PROTTOTAL 8.0 03/21/2022     (H) 03/21/2022     (H) 03/21/2022    ALKPHOS 120 03/21/2022    BILITOTAL 3.4 (H) 03/21/2022     OTHER:   Lab Results   Component Value Date    A1C 11.0 (H) 03/18/2022    KERRI 9.6 03/21/2022    MAG 2.1 09/05/2018    LIPASE 51 03/21/2022       Anesthesia Plan    ASA Status:  2   NPO Status:  NPO Appropriate    Anesthesia Type: General.     - Airway: ETT   Induction: Intravenous.           Consents    Anesthesia Plan(s) and associated risks, benefits, and realistic alternatives discussed. Questions answered and patient/representative(s) expressed understanding.    - Discussed:     - Discussed with:  Patient      - Extended Intubation/Ventilatory Support Discussed: No.      - Patient is DNR/DNI Status: No    Use of blood products discussed: No .     Postoperative Care    Pain management: IV analgesics, Oral pain medications.   PONV prophylaxis: Ondansetron (or other 5HT-3), Dexamethasone or Solumedrol     Comments:                Magaly Chung MD

## 2022-03-23 NOTE — PROVIDER NOTIFICATION
PROVIDER NOTIFICATION    Reason for communication: Place diet order for the pt.  Team member name: Dr. Mariscal  Team member role: Attending   Method of Communication: Text page  Response: awaiting response  Response time: awaiting response

## 2022-03-23 NOTE — PLAN OF CARE
PRIMARY DIAGNOSIS: BILIARY COLIC/UNCOMPLICATED EARLY ACUTE CHOLECYSTITIS  OUTPATIENT/OBSERVATION GOALS TO BE MET BEFORE DISCHARGE:    1. Pain status: Pain free.  2. Stable vital signs and labs (if performed) at disposition: Yes  3. Tolerating adequate PO diet: No. NPO  4. Successful cholecystectomy or clear follow up plan with General Surgery team if immediate surgery not performed No. Pt npo for surgery this am  5. ADLs back to baseline?  Yes  6. Activity and level of assistance: Ambulating independently.  7. Barriers to discharge noted Yes. Jocelyn romero     Discharge Planner Nurse   Safe discharge environment identified: Yes  Barriers to discharge: Yes       Entered by: Emily Alcantara 03/23/2022 6:19 AM     Please review provider order for any additional goals.   Nurse to notify provider when observation goals have been met and patient is ready for discharge.Goal Outcome Evaluation:      pt a/o without c/o pain. VSS. Ambulating to bathroom independently.

## 2022-03-23 NOTE — ANESTHESIA POSTPROCEDURE EVALUATION
Patient: Farhat Tejada    Procedure: Procedure(s):  CHOLECYSTECTOMY, LAPAROSCOPIC       Anesthesia Type:  General    Note:  Disposition: Inpatient   Postop Pain Control: Uneventful            Sign Out: Well controlled pain   PONV: No   Neuro/Psych: Uneventful            Sign Out: Acceptable/Baseline neuro status   Airway/Respiratory: Uneventful            Sign Out: Acceptable/Baseline resp. status   CV/Hemodynamics: Uneventful            Sign Out: Acceptable CV status; No obvious hypovolemia; No obvious fluid overload   Other NRE: NONE   DID A NON-ROUTINE EVENT OCCUR? No           Last vitals:  Vitals Value Taken Time   /69 03/23/22 1015   Temp 37  C (98.6  F) 03/23/22 0951   Pulse 84 03/23/22 1023   Resp 14 03/23/22 1023   SpO2 95 % 03/23/22 1023   Vitals shown include unvalidated device data.    Electronically Signed By: Magaly Chung MD  March 23, 2022  2:54 PM

## 2022-03-23 NOTE — PLAN OF CARE
PRIMARY DIAGNOSIS: ACUTE PAIN  OUTPATIENT/OBSERVATION GOALS TO BE MET BEFORE DISCHARGE:  1. Pain Status: Pain free.    2. Return to near baseline physical activity: Yes    3. Cleared for discharge by consultants (if involved): No    Discharge Planner Nurse   Safe discharge environment identified: Yes  Barriers to discharge: Yes       Entered by: Lilo Leung 03/22/2022 9:50 PM     Please review provider order for any additional goals.   Nurse to notify provider when observation goals have been met and patient is ready for discharge.Goal Outcome Evaluation:    Patient continues to deny pain.  Up with standby assist to void in the bathroom.  IV zosyn running.  VSS.  Plan for lap heather tomorrow and NPO at midnight.  Will continue to monitor.

## 2022-03-23 NOTE — PROGRESS NOTES
.  Hospitalist Progress Note    Assessment/Plan  Active Problems:    Acute cholecystitis    Nausea    Abnormal LFTs    Farhat Tejada is a 61 year old male admitted on 3/21/2022.   61-year-old male past medical history of type II DM, GERD and hyperlipidemia presented with epigastric abdominal pain admitted for acute cholecystitis        Abdominal pain  Suspected cholecystitis  -Ultrasound showing cholelithiasis and mild gallbladder wall thickening.    -Surgery and GI following  -S/p ERCP 3/22 with sludge see in CBD   -S/p lap heather today  -Pain control, IVF, abx. Start clear liquid diet.  -Trend LFT's trended up today likely 2/2 to manipulation from procedure/surgery ?  -Check lipase to rule out any pancreatitis.        Elevated liver enzymes.  -LFT's doubled today, likely 2/2 to manipulation from surgery  -CT abdomen and ultrasound showing no CBD Dilatation  -Monitor serially  -GI following     Type II DM  -Recently diagnosed, started on Metformin,  -Holding home Metformin and on sliding scale insulin     Hyperlipidemia  -Hold statin until LFTs improve    Barriers to Discharge:  Post-op monitoring    Anticipated discharge date/Disposition: Likely tomorrow     Subjective  Chart reviewed and events noted.  Patient seen and examined.   Patient seen in PACU. Awake, alert, appears comfortable. Denies any pain currently.    Objective    Vital signs in last 24 hours  Temp:  [97.1  F (36.2  C)-100.4  F (38  C)] 98.6  F (37  C)  Pulse:  [75-98] 82  Resp:  [11-20] 15  BP: (118-148)/(67-84) 132/69  SpO2:  [93 %-100 %] 94 % [unfilled] O2 Device: None (Room air)    Weight:   [unfilled] Weight change: -1.814 kg (-4 lb)    Intake/Output last 3 shifts  I/O last 3 completed shifts:  In: 1221 [P.O.:1080; I.V.:141]  Out: -   Body mass index is 28.31 kg/m .    Physical Exam    General Appearance:    Alert, cooperative, no distress, appears stated age   Lungs:     CTAB   Cardiovascular:    RRR   Abdomen:        Neurologic:   Grossly  normal     Pertinent Labs   Lab Results: personally reviewed.   Recent Labs   Lab 03/23/22  0600 03/21/22  2135 03/21/22  0740     --  135*   CO2 23  --  23   BUN 12  --  12   ALBUMIN 3.4* 4.3 4.2   ALKPHOS 121* 120 89   * 306* 131*   * 336* 135*     Recent Labs   Lab 03/23/22  0600 03/21/22  2135 03/21/22  0740   WBC 8.7 10.0 7.0   HGB 11.1* 12.7* 12.1*   HCT 37.6* 42.6 41.0    256 251     Recent Labs   Lab 03/21/22  0740   TROPONINI <0.01     Invalid input(s): POCGLUFGR    Medications  Drug and lactation database from the [place] [employer] of Medicine:  http://toxnet.nlm.nih.gov/cgi-bin/sis/htmlgen?LACT      Pertinent Radiology   Radiology Results:  Personally reviewed impressions    Reviewed labs in last 24 hours.    Advanced Care Planning:  Discharge Planning discussed with patient  Total time with this patient is 25 min with greater than 50% of time spent in coordination of care.  Care discussed and coordinated with patient,    This Note is created using dragon voice recognition software.  Errors in spelling or words which seems out of context are unintentional.  Sounds alike errors may have escaped editing.    Viviane Mariscal MD  Hospitalist

## 2022-03-23 NOTE — PLAN OF CARE
PRIMARY DIAGNOSIS: ACUTE PAIN  OUTPATIENT/OBSERVATION GOALS TO BE MET BEFORE DISCHARGE:  1. Pain Status: Pain free.    2. Return to near baseline physical activity: Yes    3. Cleared for discharge by consultants (if involved): No    Discharge Planner Nurse   Safe discharge environment identified: Yes  Barriers to discharge: Yes       Entered by: Lilo Leung 03/22/2022 9:48 PM     Please review provider order for any additional goals.   Nurse to notify provider when observation goals have been met and patient is ready for discharge.Goal Outcome Evaluation:        Patient admitted to the unit from PACU.  Patient is alert and oriented x 4.  Denies pain.  Lung sounds clear and on room air.  Normal saline running at 75/hr per orders.  Tolerating clear liquid diet well.  Blood glucose checked and was 244.

## 2022-03-23 NOTE — UTILIZATION REVIEW
Admission Status; Secondary Review Determination   Under the authority of the Utilization Management Committee, the utilization review process indicated a secondary review on Farhat Tejada. The review outcome is based on review of the medical records, discussions with staff, and applying clinical experience noted on the date of the review.   (x) Inpatient Status Appropriate - This patient's medical care is consistent with medical management for inpatient care and reasonable inpatient medical practice.     RATIONALE FOR DETERMINATION   61-year-old male presented with abdominal pain and admitted for acute cholecystitis and choledocholithiasis.  Underwent ERCP with biliary sphincterotomy stone extraction followed by cholecystectomy today.  LFTs elevated today, will trend.  On IV fluids, IV antibiotics and starting on clear liquids today.  Also has diabetes and blood sugars have been elevated in the mid 200s, most recent blood sugar 301 following dexamethasone therapy.    At the time of admission with the information available to the attending physician more than 2 nights Hospital complex care was anticipated, based on patient risk of adverse outcome if treated as outpatient and complex care required. Inpatient admission is appropriate based on the Medicare guidelines.   The information on this document is developed by the utilization review team in order for the business office to ensure compliance. This only denotes the appropriateness of proper admission status and does not reflect the quality of care rendered.   The definitions of Inpatient Status and Observation Status used in making the determination above are those provided in the CMS Coverage Manual, Chapter 1 and Chapter 6, section 70.4.   Sincerely,   Champ Jensen MD  Utilization Review  Physician Advisor  [facility]

## 2022-03-24 VITALS
HEART RATE: 76 BPM | OXYGEN SATURATION: 97 % | BODY MASS INDEX: 28.42 KG/M2 | TEMPERATURE: 98 F | WEIGHT: 203 LBS | SYSTOLIC BLOOD PRESSURE: 152 MMHG | DIASTOLIC BLOOD PRESSURE: 83 MMHG | HEIGHT: 71 IN | RESPIRATION RATE: 16 BRPM

## 2022-03-24 PROBLEM — R11.0 NAUSEA: Status: RESOLVED | Noted: 2022-03-21 | Resolved: 2022-03-24

## 2022-03-24 PROBLEM — K81.0 ACUTE CHOLECYSTITIS: Status: RESOLVED | Noted: 2022-03-21 | Resolved: 2022-03-24

## 2022-03-24 LAB
ALBUMIN SERPL-MCNC: 3 G/DL (ref 3.5–5)
ALP SERPL-CCNC: 96 U/L (ref 45–120)
ALT SERPL W P-5'-P-CCNC: 557 U/L (ref 0–45)
ANION GAP SERPL CALCULATED.3IONS-SCNC: 7 MMOL/L (ref 5–18)
AST SERPL W P-5'-P-CCNC: 255 U/L (ref 0–40)
BILIRUB DIRECT SERPL-MCNC: 1.4 MG/DL
BILIRUB SERPL-MCNC: 2.9 MG/DL (ref 0–1)
BUN SERPL-MCNC: 14 MG/DL (ref 8–22)
CALCIUM SERPL-MCNC: 8.4 MG/DL (ref 8.5–10.5)
CHLORIDE BLD-SCNC: 105 MMOL/L (ref 98–107)
CO2 SERPL-SCNC: 22 MMOL/L (ref 22–31)
CREAT SERPL-MCNC: 0.91 MG/DL (ref 0.7–1.3)
ERYTHROCYTE [DISTWIDTH] IN BLOOD BY AUTOMATED COUNT: 16.1 % (ref 10–15)
GFR SERPL CREATININE-BSD FRML MDRD: >90 ML/MIN/1.73M2
GLUCOSE BLD-MCNC: 230 MG/DL (ref 70–125)
GLUCOSE BLDC GLUCOMTR-MCNC: 212 MG/DL (ref 70–99)
GLUCOSE BLDC GLUCOMTR-MCNC: 248 MG/DL (ref 70–99)
HCT VFR BLD AUTO: 32.5 % (ref 40–53)
HGB BLD-MCNC: 9.8 G/DL (ref 13.3–17.7)
MAGNESIUM SERPL-MCNC: 2.1 MG/DL (ref 1.8–2.6)
MCH RBC QN AUTO: 18.9 PG (ref 26.5–33)
MCHC RBC AUTO-ENTMCNC: 30.2 G/DL (ref 31.5–36.5)
MCV RBC AUTO: 63 FL (ref 78–100)
PATH REPORT.COMMENTS IMP SPEC: NORMAL
PATH REPORT.COMMENTS IMP SPEC: NORMAL
PATH REPORT.FINAL DX SPEC: NORMAL
PATH REPORT.GROSS SPEC: NORMAL
PATH REPORT.MICROSCOPIC SPEC OTHER STN: NORMAL
PATH REPORT.RELEVANT HX SPEC: NORMAL
PHOSPHATE SERPL-MCNC: 2.3 MG/DL (ref 2.5–4.5)
PHOTO IMAGE: NORMAL
PLATELET # BLD AUTO: 221 10E3/UL (ref 150–450)
POTASSIUM BLD-SCNC: 4.4 MMOL/L (ref 3.5–5)
PROT SERPL-MCNC: 6.2 G/DL (ref 6–8)
RBC # BLD AUTO: 5.19 10E6/UL (ref 4.4–5.9)
SODIUM SERPL-SCNC: 134 MMOL/L (ref 136–145)
WBC # BLD AUTO: 9.2 10E3/UL (ref 4–11)

## 2022-03-24 PROCEDURE — 82248 BILIRUBIN DIRECT: CPT | Performed by: SURGERY

## 2022-03-24 PROCEDURE — 80053 COMPREHEN METABOLIC PANEL: CPT | Performed by: HOSPITALIST

## 2022-03-24 PROCEDURE — 83735 ASSAY OF MAGNESIUM: CPT | Performed by: SURGERY

## 2022-03-24 PROCEDURE — 85014 HEMATOCRIT: CPT | Performed by: HOSPITALIST

## 2022-03-24 PROCEDURE — 250N000011 HC RX IP 250 OP 636: Performed by: INTERNAL MEDICINE

## 2022-03-24 PROCEDURE — 99239 HOSP IP/OBS DSCHRG MGMT >30: CPT | Performed by: INTERNAL MEDICINE

## 2022-03-24 PROCEDURE — 250N000011 HC RX IP 250 OP 636: Performed by: SURGERY

## 2022-03-24 PROCEDURE — 36415 COLL VENOUS BLD VENIPUNCTURE: CPT | Performed by: HOSPITALIST

## 2022-03-24 PROCEDURE — 88304 TISSUE EXAM BY PATHOLOGIST: CPT | Mod: 26 | Performed by: PATHOLOGY

## 2022-03-24 PROCEDURE — 84100 ASSAY OF PHOSPHORUS: CPT | Performed by: SURGERY

## 2022-03-24 PROCEDURE — 99024 POSTOP FOLLOW-UP VISIT: CPT | Performed by: NURSE PRACTITIONER

## 2022-03-24 PROCEDURE — 250N000013 HC RX MED GY IP 250 OP 250 PS 637: Performed by: HOSPITALIST

## 2022-03-24 RX ORDER — GLUCOSAMINE HCL/CHONDROITIN SU 500-400 MG
CAPSULE ORAL
Qty: 100 EACH | Refills: 6 | Status: SHIPPED | OUTPATIENT
Start: 2022-03-24

## 2022-03-24 RX ADMIN — Medication 40 MG: at 06:47

## 2022-03-24 RX ADMIN — INSULIN ASPART 2 UNITS: 100 INJECTION, SOLUTION INTRAVENOUS; SUBCUTANEOUS at 08:25

## 2022-03-24 RX ADMIN — PIPERACILLIN SODIUM AND TAZOBACTAM SODIUM 3.38 G: 3; .375 INJECTION, POWDER, LYOPHILIZED, FOR SOLUTION INTRAVENOUS at 01:01

## 2022-03-24 RX ADMIN — PIPERACILLIN SODIUM AND TAZOBACTAM SODIUM 3.38 G: 3; .375 INJECTION, POWDER, LYOPHILIZED, FOR SOLUTION INTRAVENOUS at 09:51

## 2022-03-24 RX ADMIN — INSULIN ASPART 3 UNITS: 100 INJECTION, SOLUTION INTRAVENOUS; SUBCUTANEOUS at 12:08

## 2022-03-24 ASSESSMENT — ACTIVITIES OF DAILY LIVING (ADL)
ADLS_ACUITY_SCORE: 4

## 2022-03-24 NOTE — PROGRESS NOTES
"GI PROGRESS NOTE  3/24/2022  Farhat Tejada  1960  /-38    Subjective:   Patient denies any abdominal or incisional pain today. Denies any nausea or vomiting, tolerating a normal diet.      Objective:     Blood pressure (!) 152/83, pulse 76, temperature 98  F (36.7  C), temperature source Oral, resp. rate 16, height 1.803 m (5' 11\"), weight 92.1 kg (203 lb), SpO2 97 %.    Body mass index is 28.31 kg/m .  Gen: NAD  Cardio: RRR  GI: Non-distended, BS positive, soft, non-tender    Laboratory:    BMP  Recent Labs   Lab 03/24/22  0732 03/24/22  0610 03/23/22 2022 03/23/22  0904 03/23/22  0600 03/22/22  0335 03/21/22  0740   NA  --  134*  --   --  137  --  135*   POTASSIUM  --  4.4  --   --  4.4  --  4.3   CHLORIDE  --  105  --   --  104  --  100   KERRI  --  8.4*  --   --  9.2  --  9.6   CO2  --  22  --   --  23  --  23   BUN  --  14  --   --  12  --  12   CR  --  0.91  --   --  1.13  --  1.17   * 230* 284*   < > 251*   < > 260*    < > = values in this interval not displayed.     CBC  Recent Labs   Lab 03/24/22  0610 03/23/22  0600 03/21/22 2135   WBC 9.2 8.7 10.0   RBC 5.19 5.97* 6.83*   HGB 9.8* 11.1* 12.7*   HCT 32.5* 37.6* 42.6   MCV 63* 63* 62*   MCH 18.9* 18.6* 18.6*   MCHC 30.2* 29.5* 29.8*   RDW 16.1* 16.2* 17.7*    219 256     INRNo lab results found in last 7 days.   LFTs  Recent Labs   Lab Test 03/24/22  0610 03/23/22  0600 03/21/22  2135 03/21/22  0740 03/21/22  0740 03/18/22  1403   ALBUMIN 3.0* 3.4* 4.3   < > 4.2  --    BILITOTAL 2.9* 7.2* 3.4*   < > 2.0*  --    * 719* 306*   < > 131*  --    * 549* 336*   < > 135*  --    PROTEIN  --   --   --   --   --  Negative   LIPASE  --  27  --   --  51  --     < > = values in this interval not displayed.        Assessment:   1. Acute Cholecystitis   - Patient is a 61 year old male S/p ERCP 3/22/22 with sludge seen in CBD and S/p cholecystomy today (3/23/22) with LFT elevation post surgery. Patient without pain or associated " symptoms, currently tolerating a normal diet. LFTs trending down today, patient feeling overall well.      Plan:   1. Okay from our standpoint for discharge  2. Trend LFTs through PCP provider and follow up with surgery team post discharge.  3. Follow up with GI if LFT elevation persists                                                                            KEENAN Cotton CNP  Thank you for the opportunity to participate in the care of this patient.   Please feel free to call me with any questions or concerns.  Phone number [phone].

## 2022-03-24 NOTE — PLAN OF CARE
"  Problem: Plan of Care - These are the overarching goals to be used throughout the patient stay.    Goal: Plan of Care Review/Shift Note  Description: The Plan of Care Review/Shift note should be completed every shift.  The Outcome Evaluation is a brief statement about your assessment that the patient is improving, declining, or no change.  This information will be displayed automatically on your shift note.  Outcome: Ongoing, Progressing  Goal: Patient-Specific Goal (Individualized)  Description: You can add care plan individualizations to a care plan. Examples of Individualization might be:  \"Parent requests to be called daily at 9am for status\", \"I have a hard time hearing out of my right ear\", or \"Do not touch me to wake me up as it startles me\".  Outcome: Ongoing, Progressing  Goal: Absence of Hospital-Acquired Illness or Injury  Outcome: Ongoing, Progressing  Intervention: Identify and Manage Fall Risk  Recent Flowsheet Documentation  Taken 3/24/2022 0500 by Emily Alcantara RN  Safety Promotion/Fall Prevention: lighting adjusted  Taken 3/24/2022 0100 by Emily Alcantara RN  Safety Promotion/Fall Prevention: lighting adjusted  Intervention: Prevent Skin Injury  Recent Flowsheet Documentation  Taken 3/24/2022 0500 by Emily Alcantara RN  Body Position: position changed independently  Taken 3/24/2022 0100 by Emily Alcantara RN  Body Position: position changed independently  Intervention: Prevent and Manage VTE (Venous Thromboembolism) Risk  Recent Flowsheet Documentation  Taken 3/24/2022 0500 by Emily Alcantara RN  Activity Management: activity adjusted per tolerance  Taken 3/24/2022 0100 by Emily Alcantara RN  Activity Management: activity adjusted per tolerance  Goal: Optimal Comfort and Wellbeing  Outcome: Ongoing, Progressing  Intervention: Monitor Pain and Promote Comfort  Recent Flowsheet Documentation  Taken 3/24/2022 0500 by Emily Alcantara RN  Pain Management Interventions: " declines  Taken 3/24/2022 0100 by Emily Alcantara, RN  Pain Management Interventions: declines  Goal: Readiness for Transition of Care  Outcome: Ongoing, Progressing     Problem: Pain Acute  Goal: Acceptable Pain Control and Functional Ability  Outcome: Ongoing, Progressing  Intervention: Develop Pain Management Plan  Recent Flowsheet Documentation  Taken 3/24/2022 0500 by Emily Alcantara, RN  Pain Management Interventions: declines  Taken 3/24/2022 0100 by Emily Alcantara, RN  Pain Management Interventions: declines     Problem: Surgical Site Infection  Goal: Absence of Infection Signs and Symptoms  Outcome: Ongoing, Progressing   Goal Outcome Evaluation:           Uneventful shift. Pt a/o, VSS, minimal pain. Pt able to ambulate independently. Pt looking forward to diet change from cl liquid and to going home. Will monitor.

## 2022-03-24 NOTE — CONSULTS
DIABETES CARE  Situation:  Consulted by Provider for Diabetes Education  61year old male with type 2 Diabetes new onset. Patient was admitted for abdominal pain and acuate cholecystitis with subsequent gall bladder removal.     Background:  Related Co-morbidities include:HLD  PCP: Helena Magaña PA-C  Social: Lives in home with spouse Ketty    Diabetes History:   New diagnosis of Type 2 diabetes a few days prior to admission.       Meds for BG Management PTA:  Metformin 500 mg XR twice daily      Current Inpatient Meds for BG Management:  Novolog medium correction scale 1 drops 50 over 140 mg/dl    Labs:  Hemoglobin A1C: 11.0              Hgb: 12.1    SCr: 0.91    GFR: >90   Blood Glucose POC:   Results for RENNY WELLS (MRN [id number]) as of 3/24/2022 10:52   Ref. Range 3/23/2022 12:24 3/23/2022 16:59 3/23/2022 20:22 3/24/2022 06:10 3/24/2022 07:32   Glucose Latest Ref Range: 70 - 125 mg/dL    230 (H)    GLUCOSE BY METER POCT Latest Ref Range: 70 - 99 mg/dL 301 (H) 292 (H) 284 (H)  212 (H)       Diet Order:  Regular diet  Intake: 100%   Weight: 92.1kg    BMI:28.31    DM EDUCATION/COUNSELING:  Barriers to Learning and/or DM Self-Management: None known  Previous DM Education: No just diagnosed and given metformin via phone  Current Education and/or visit with Patient and/or caregiver  Educated/reviewed diabetes basics: pathophysiology, hyperglycemia, treatment.  Educated/reviewed hypoglycemia: sx, causes, treatment  Explained normal/goals of blood sugar control,A1C, when to call provider.  Educated/reviewed use of home glucose meter and patient able to demonstrate its use.    Specific medications were explained, including how they each acted on blood sugar and when to call if side effects do not lessen.   Carbohydrates were briefly discussed and their effect on BG. Reinforced healthy eating.     Written handouts given on all education provided.     Recommendations:  1. Continue with metformin at dose  "prescribed. If tolerate discuss increasing dose with MD if blood sugars are not in target range.  2. Follow up with provider after hospitalization.     Refer to \"Guidelines for Insulin Initiation and Care in Hospitalized Adults\"  link in Diabetes Management Order set for dosing guidelines    Hospital BG goals for blood glucose levels are < 180 for improved health outcomes.    DISCHARGE NEEDS:   RX needed at DC (preferred by patient's insurance):  1. Contour Next EZ strips, 2 boxes of 50  2. Microlet lancets, box of 100  E11.65   To test BG twice daily  Non-insulin requiring    Thank you,   Jocy Olea RN, [facility]  VM: [phone]  Pager: [phone]             "

## 2022-03-24 NOTE — PROGRESS NOTES
ASSESSMENT:  1. Elevated liver enzymes    2. Acute cholecystitis    3. Nausea    4. Abnormal LFTs        Farhat Tejada is a 61 year old male who is s/p ERCP with sphincterotomy on 3/22/2022 and laparoscopic cholecystectomy on 3/23/2022.  He is doing well from a surgical standpoint liver enzymes are trending downward.     PLAN:  Diet as tolerated  Home from a surgical standpoint when deemed medically appropriate and okay with GI  Suggest recheck of liver enzymes in about a week  Discharge instructions/recommendations placed    SUBJECTIVE:   He is feeling better.  Patient reports pain is controlled.  He has had no fever and chills.  Tolerating regular diet without nausea or vomiting.  Passing flatus      Patient Vitals for the past 24 hrs:   BP Temp Temp src Pulse Resp SpO2   03/24/22 0728 (!) 152/83 98  F (36.7  C) Oral 76 16 97 %   03/24/22 0311 (!) 141/83 98.1  F (36.7  C) Oral 70 16 95 %   03/23/22 2304 (!) 167/86 98.8  F (37.1  C) Oral 79 17 96 %   03/23/22 1928 (!) 147/83 98.2  F (36.8  C) Oral 84 16 95 %   03/23/22 1515 (!) 148/72 98.2  F (36.8  C) Oral 87 16 99 %   03/23/22 1130 126/63 -- -- 84 16 93 %   03/23/22 1049 136/72 98.5  F (36.9  C) Oral 85 -- 93 %   03/23/22 1023 -- -- -- -- -- 95 %   03/23/22 1015 132/69 -- -- 82 15 94 %   03/23/22 1000 125/67 -- -- 83 14 93 %   03/23/22 0951 129/68 98.6  F (37  C) Temporal 83 15 93 %   03/23/22 0930 132/71 -- -- 81 15 94 %   03/23/22 0916 (!) 142/71 -- -- 80 18 97 %   03/23/22 0915 137/75 -- -- 80 11 98 %         PHYSICAL EXAM:  GEN: No acute distress, comfortable  LUNGS: CTA bilaterally  CV:RRR  ABD: Soft, not distended, expected TTP near incisions, incisions CDI  EXT:No cyanosis, edema or obvious abnormalities    03/23 0700 - 03/24 0659  In: 3517.5 [P.O.:700; I.V.:2817.5]  Out: 2830 [Urine:2825]    Admission on 03/21/2022   Component Date Value     Bilirubin Total 03/21/2022 3.4 (A)     Bilirubin Direct 03/21/2022 1.1 (A)     Protein Total 03/21/2022 8.0       Albumin 03/21/2022 4.3      Alkaline Phosphatase 03/21/2022 120      AST 03/21/2022 336 (A)     ALT 03/21/2022 306 (A)     WBC Count 03/21/2022 10.0      RBC Count 03/21/2022 6.83 (A)     Hemoglobin 03/21/2022 12.7 (A)     Hematocrit 03/21/2022 42.6      MCV 03/21/2022 62 (A)     MCH 03/21/2022 18.6 (A)     MCHC 03/21/2022 29.8 (A)     RDW 03/21/2022 17.7 (A)     Platelet Count 03/21/2022 256      SARS CoV2 PCR 03/22/2022 Negative      GLUCOSE BY METER POCT 03/22/2022 238 (A)     GLUCOSE BY METER POCT 03/22/2022 221 (A)     Hepatitis A Antibody IgM 03/22/2022 Negative      Hepatitis B Core Antibod* 03/22/2022 Negative      Hepatitis C Antibody 03/22/2022 Negative      Hepatitis B Surface Anti* 03/22/2022 Nonreactive      EBV DNA Copies/mL 03/22/2022 Not Detected      CMV DNA IU/mL 03/22/2022 Not Detected      GLUCOSE BY METER POCT 03/22/2022 193 (A)     GLUCOSE BY METER POCT 03/22/2022 229 (A)     ERCP 03/22/2022                      Value:[facility]  [address] [place] [address]  _______________________________________________________________________________  Patient Name: Renny Terrell           Procedure Date: 3/22/2022 2:57 PM  MRN: [id number]                       Account Number: [id number]  YOB: 1960                Age: 61  Attending MD: RENNY YORK MD      _______________________________________________________________________________     THIS EXAM WAS SENT IN ERROR       Upper EUS 03/22/2022                      Value:[facility]  [address] [place] [address]  _______________________________________________________________________________  Patient Name: Renny Terrell           Procedure Date: 3/22/2022 3:13 PM  MRN: [id number]                       Account Number: [id number]  YOB: 1960               Admit Type: Inpatient  Age: 61                                Note Status: Finalized  Attending MD: RENNY YORK  , MD   Instrument Name: EUS Linear Scope 3560  _______________________________________________________________________________     Procedure:             Upper EUS  Indications:           Elevated liver enzymes  Providers:             RENNY YORK MD  Patient Profile:       This is a 61 year old male.  Referring MD:            Medicines:             General Anesthesia  Complications:         No immediate complications.  _______________________________________________________________________________  Procedure:             Pre-Anesthesia Asse                          ssment:                         - Prior to the procedure, a History and Physical was                          performed, and patient medications, allergies and                          sensitivities were reviewed. The patient's tolerance                          of previous anesthesia was reviewed.                         After obtaining informed consent, the endoscope was                          passed under direct vision. Throughout the procedure,                          the patient's blood pressure, pulse, and oxygen                          saturations were monitored continuously. The EUS                          linear scope was introduced through the mouth, and                          advanced to the second part of duodenum. The upper EUS                          was accomplished without difficulty. The patient                          tolerated the procedure well.                                                                                   Findings:       ENDOSONOGRAPHIC                           FINDING: :       Many stones were visualized endosonographically in the gallbladder body.        The stones measured up to 10 mm in greatest dimension. The stones were        round. They were characterized by shadowing.       A small amount of hyperechoic material consistent with sludge was        visualized endosonographically in the common  bile duct.                                                                                   Moderate Sedation:       General Anesthesia  Impression:            - Many stones were visualized endosonographically in                          the gallbladder body.                         - Hyperechoic material consistent with sludge was                          visualized endosonographically in the common bile duct.                         - No specimens collected.  Recommendation:        - Perform an ERCP today to clear the bile duct.                         - Lap heather in the AM.                                                                                                               Renny Rodriguez MD  ____________________  RENNY RODRIGUEZ MD  3/22/2022 4:09:59 PM  I was physically present for the entire viewing portion of the exam.  __________________________  Signature of teaching physician  Ottoniel/Lina RODRIGUEZ MD  Number of Addenda: 0    Note Initiated On: 3/22/2022 3:13 PM  Scope In: 3:39:35 PM  Scope Out: 3:43:23 PM       ERCP 03/22/2022                      Value:[place] [address] [facility] [place] [address]  _______________________________________________________________________________  Patient Name: Renny Tejada           Procedure Date: 3/22/2022 3:13 PM  MRN: [id number]                       Account Number: [id number]  YOB: 1960               Admit Type: Inpatient  Age: 61                                Note Status: Finalized  Attending MD: RENNY RODRIGUEZ MD   Instrument Name: ERCP Scope 3822  _______________________________________________________________________________     Procedure:             ERCP  Indications:           Bile duct stone(s)  Providers:             RENNY RODRIGUEZ MD  Patient Profile:       This is a 61 year old male.  Referring MD:            Medicines:             General Anesthesia  Complications:         No immediate  complications.  _______________________________________________________________________________  Procedure:             Pre-Anesthesia Assessment:                                                   - Prior to the procedure, a History and Physical was                          performed, and patient medications, allergies and                          sensitivities were reviewed. The patient's tolerance                          of previous anesthesia was reviewed.                         After obtaining informed consent, the scope was passed                          under direct vision. Throughout the procedure, the                          patient's blood pressure, pulse, and oxygen                          saturations were monitored continuously. The ERCP                          scope was introduced through the mouth, and used to                          inject contrast into and used to inject contrast into                          the bile duct. The ERCP was accomplished without                          difficulty. The patient tolerated the procedure well.                                                                                   Findings:       The                            film was normal. The esophagus was successfully intubated        under direct vision. The scope was advanced to a normal major papilla in        the descending duodenum without detailed examination of the pharynx,        larynx and associated structures, and upper GI tract. The upper GI tract        was grossly normal. The bile duct was deeply cannulated. Contrast was        injected. The lower third of the main bile duct contained filling        defect(s) thought to be sludge. A wire was passed into the biliary tree.        A 7 mm biliary sphincterotomy was made with a traction (standard)        sphincterotome using ERBE electrocautery. There was no        post-sphincterotomy bleeding. The biliary tree was swept with an 8.5 mm         balloon starting at the bifurcation. Sludge was swept from the duct.                                                                                   Moderate Sedation:       General Anesthesia  Impression:            - The examination was suspicious fo                          r sludge.                         - A biliary sphincterotomy was performed.                         - The biliary tree was swept and sludge was found.  Recommendation:        - [facility].                         - Discharge the patient to home.                         - No anticoagulation for 72 hours.                         - Clear liquid diet for 24 hours.                         - If pain free after 24 hours advance diet slowly as                          tolerated.                                                                                     Renny Rodriguez MD  ____________________  RENNY RODRIGUEZ MD  3/22/2022 4:12:38 PM  I was physically present for the entire viewing portion of the exam.  __________________________  Signature of teaching physician  [id number] RODRIGUEZ MD  Number of Addenda: 0    Note Initiated On: 3/22/2022 3:13 PM  Scope In: 3:54:58 PM  Scope Out: 4:07:50 PM       GLUCOSE BY METER POCT 03/22/2022 244 (A)     Sodium 03/23/2022 137      Potassium 03/23/2022 4.4      Chloride 03/23/2022 104      Carbon Dioxide (CO2) 03/23/2022 23      Anion Gap 03/23/2022 10      Urea Nitrogen 03/23/2022 12      Creatinine 03/23/2022 1.13      Calcium 03/23/2022 9.2      Glucose 03/23/2022 251 (A)     Alkaline Phosphatase 03/23/2022 121 (A)     AST 03/23/2022 549 (A)     ALT 03/23/2022 719 (A)     Protein Total 03/23/2022 7.1      Albumin 03/23/2022 3.4 (A)     Bilirubin Total 03/23/2022 7.2 (A)     GFR Estimate 03/23/2022 73      WBC Count 03/23/2022 8.7      RBC Count 03/23/2022 5.97 (A)     Hemoglobin 03/23/2022 11.1 (A)     Hematocrit 03/23/2022 37.6 (A)     MCV 03/23/2022 63 (A)      MCH 03/23/2022 18.6 (A)     MCHC 03/23/2022 29.5 (A)     RDW 03/23/2022 16.2 (A)     Platelet Count 03/23/2022 219      % Neutrophils 03/23/2022 82      % Lymphocytes 03/23/2022 12      % Monocytes 03/23/2022 5      % Eosinophils 03/23/2022 0      % Basophils 03/23/2022 0      % Immature Granulocytes 03/23/2022 1      NRBCs per 100 WBC 03/23/2022 0      Absolute Neutrophils 03/23/2022 7.2      Absolute Lymphocytes 03/23/2022 1.1      Absolute Monocytes 03/23/2022 0.5      Absolute Eosinophils 03/23/2022 0.0      Absolute Basophils 03/23/2022 0.0      Absolute Immature Granul* 03/23/2022 0.0      Absolute NRBCs 03/23/2022 0.0      GLUCOSE BY METER POCT 03/23/2022 241 (A)     Lipase 03/23/2022 27      GLUCOSE BY METER POCT 03/23/2022 301 (A)     GLUCOSE BY METER POCT 03/22/2022 237 (A)     GLUCOSE BY METER POCT 03/23/2022 292 (A)     GLUCOSE BY METER POCT 03/23/2022 284 (A)     Magnesium 03/24/2022 2.1      Phosphorus 03/24/2022 2.3 (A)     WBC Count 03/24/2022 9.2      RBC Count 03/24/2022 5.19      Hemoglobin 03/24/2022 9.8 (A)     Hematocrit 03/24/2022 32.5 (A)     MCV 03/24/2022 63 (A)     MCH 03/24/2022 18.9 (A)     MCHC 03/24/2022 30.2 (A)     RDW 03/24/2022 16.1 (A)     Platelet Count 03/24/2022 221      Sodium 03/24/2022 134 (A)     Potassium 03/24/2022 4.4      Chloride 03/24/2022 105      Carbon Dioxide (CO2) 03/24/2022 22      Anion Gap 03/24/2022 7      Urea Nitrogen 03/24/2022 14      Creatinine 03/24/2022 0.91      Calcium 03/24/2022 8.4 (A)     Glucose 03/24/2022 230 (A)     Alkaline Phosphatase 03/24/2022 96      AST 03/24/2022 255 (A)     ALT 03/24/2022 557 (A)     Protein Total 03/24/2022 6.2      Albumin 03/24/2022 3.0 (A)     Bilirubin Total 03/24/2022 2.9 (A)     GFR Estimate 03/24/2022 >90      Bilirubin Direct 03/24/2022 1.4 (A)     GLUCOSE BY METER POCT 03/24/2022 212 (A)          Olivia Jean, APRN CNP

## 2022-03-24 NOTE — PLAN OF CARE
Problem: Plan of Care - These are the overarching goals to be used throughout the patient stay.    Goal: Absence of Hospital-Acquired Illness or Injury  Outcome: Ongoing, Progressing  Intervention: Identify and Manage Fall Risk  Recent Flowsheet Documentation  Taken 3/23/2022 1658 by Edwin Silverman RN  Safety Promotion/Fall Prevention:   mobility aid in reach   nonskid shoes/slippers when out of bed   patient and family education   safety round/check completed  Intervention: Prevent and Manage VTE (Venous Thromboembolism) Risk  Recent Flowsheet Documentation  Taken 3/23/2022 1515 by Edwin Silverman RN  Activity Management: ambulated in sosa     Problem: Plan of Care - These are the overarching goals to be used throughout the patient stay.    Goal: Optimal Comfort and Wellbeing  Outcome: Ongoing, Progressing     Problem: Pain Acute  Goal: Acceptable Pain Control and Functional Ability  Outcome: Ongoing, Progressing  Intervention: Prevent or Manage Pain  Recent Flowsheet Documentation  Taken 3/23/2022 1658 by Edwin Silverman RN  Medication Review/Management: medications reviewed     Problem: Surgical Site Infection  Goal: Absence of Infection Signs and Symptoms  Outcome: Ongoing, Progressing     Pt alert and oriented. Pt c/o gas pain when passing gas. BS audible on all quadrants. No BM thus far. Dr. Mariscal gave new orders for scheduled pantoprazole suspension BID. Pt tolerated clear liquid diet this shift. Denies N&V. Pt voiding w/out difficulty utilizing the urinal. Output of 1075 mL of urine this shift thus far. Pt ambulated ~400 ft at the beginning of the shift w/ gait belt, walker, and stand by assist. Pre-prandial BG of 292. Pt given sliding scale insulin per MD orders. HS BG of 284. Pt on tele showing NSR (HR 91). Denies chest pain, SOB, or dyspnea. Pt has 5 surgical incisions from laparoscopic cholecystectomy this AM. All sites were clean, dry, and intact. Surgical well-approximated w/ surgical glue. Call  light is within reach.

## 2022-03-24 NOTE — PLAN OF CARE
"  Problem: Plan of Care - These are the overarching goals to be used throughout the patient stay.    Goal: Plan of Care Review/Shift Note  Description: The Plan of Care Review/Shift note should be completed every shift.  The Outcome Evaluation is a brief statement about your assessment that the patient is improving, declining, or no change.  This information will be displayed automatically on your shift note.  Outcome: Met  Flowsheets (Taken 3/24/2022 1117)  Plan of Care Reviewed With:   patient   spouse  Goal: Patient-Specific Goal (Individualized)  Description: You can add care plan individualizations to a care plan. Examples of Individualization might be:  \"Parent requests to be called daily at 9am for status\", \"I have a hard time hearing out of my right ear\", or \"Do not touch me to wake me up as it startles me\".  Outcome: Met  Goal: Absence of Hospital-Acquired Illness or Injury  Outcome: Met  Intervention: Identify and Manage Fall Risk  Recent Flowsheet Documentation  Taken 3/24/2022 0856 by Dhruv Cadena RN  Safety Promotion/Fall Prevention: lighting adjusted  Goal: Optimal Comfort and Wellbeing  Outcome: Met  Intervention: Monitor Pain and Promote Comfort  Recent Flowsheet Documentation  Taken 3/24/2022 0856 by Dhruv Cadena RN  Pain Management Interventions:   ambulation/increased activity   care clustered   distraction   emotional support   pillow support provided   repositioned  Goal: Readiness for Transition of Care  Outcome: Met     Problem: Pain Acute  Goal: Acceptable Pain Control and Functional Ability  Outcome: Met  Intervention: Develop Pain Management Plan  Recent Flowsheet Documentation  Taken 3/24/2022 0856 by Dhruv Cadena RN  Pain Management Interventions:   ambulation/increased activity   care clustered   distraction   emotional support   pillow support provided   repositioned     Problem: Surgical Site Infection  Goal: Absence of Infection Signs and Symptoms  Outcome: Met   Goal Outcome " Evaluation:    Plan of Care Reviewed With: patient, spouse

## 2022-03-29 NOTE — DISCHARGE SUMMARY
[facility]  Hospitalist Discharge Summary      Date of Admission:  3/21/2022  Date of Discharge:  3/24/2022  3:07 PM  Discharging Provider: Geraldine Rodriguez MD  Discharge Service: Hospitalist Service    Discharge Diagnoses   Acute cholecystitis status post cholecystectomy 3/23/2022  Nausea  Abnormal liver function tests    Follow-ups Needed After Discharge   Follow-up Appointments     Follow-up and recommended labs and tests       Follow up with primary care provider, Helena Magaña, within 7 days   to evaluate medication change, to evaluate treatment change, and for   hospital follow- up.  The following labs/tests are recommended: LFTs.             Unresulted Labs Ordered in the Past 30 Days of this Admission     No orders found from 2/19/2022 to 3/22/2022.      These results will be followed up by Geraldine Rodriguez    Discharge Disposition   Discharged to home  Condition at discharge: Good    Hospital Course   Farhat Tejada is a 61 year old male admitted on 3/21/2022.   61-year-old male past medical history of type II DM, GERD and hyperlipidemia presented with epigastric abdominal pain admitted for acute cholecystitis    Abdominal pain  Suspected cholecystitis  -Ultrasound showing cholelithiasis and mild gallbladder wall thickening.    -Surgery and GI consulted  -S/p ERCP 3/22 with sludge see in CBD   -S/p lap heather 3/23  -Pain control, IVF, abx. clear liquid diet  -Trend LFT's trended up today likely 2/2 to manipulation from procedure/surgery ?     Elevated liver enzymes.  -LFT's doubled 2/2 to manipulation from surgery  -CT abdomen and ultrasound showing no CBD Dilatation  -GI following     Type II DM  -Recently diagnosed, started on Metformin,  -Holding home Metformin and on sliding scale insulin     Hyperlipidemia  -Hold statin until LFTs improve, restarted at discharge    Consultations This Hospital Stay   SURGERY GENERAL IP CONSULT  DIABETES EDUCATION IP  CONSULT  GASTROENTEROLOGY IP CONSULT  CARE MANAGEMENT / SOCIAL WORK IP CONSULT    Code Status   Prior    Time Spent on this Encounter   I, Geraldine Rodriguez MD, personally saw the patient today and spent greater than 30 minutes discharging this patient.       Geraldine Rodriguez MD  [address] [address]  Phone: [phone]  Fax: [phone]  ______________________________________________________________________    Physical Exam   Vital Signs:                   Weight: 203 lbs 0 oz  General Appearance: Awake, alert, in no acute distress  Respiratory: CTAB, no wheeze  Cardiovascular: RRR, no murmur noted  GI: soft, nontender, non distended, normal bowel sounds  Skin: no jaundice, no rash       Primary Care Physician   Helena Magaña    Discharge Orders      Hepatic panel (Albumin, ALT, AST, Bili, Alk Phos, TP)     Reason for your hospital stay    Acute Cholecystitis     Follow-up and recommended labs and tests     Follow up with primary care provider, Helena Magaña, within 7 days to evaluate medication change, to evaluate treatment change, and for hospital follow- up.  The following labs/tests are recommended: LFTs.     Activity    Your activity upon discharge: activity as tolerated     Diet    Follow this diet upon discharge: Orders Placed This Encounter      Regular Diet Adult       Significant Results and Procedures   Most Recent 3 CBC's:Recent Labs   Lab Test 03/24/22  0610 03/23/22  0600 03/21/22  2135   WBC 9.2 8.7 10.0   HGB 9.8* 11.1* 12.7*   MCV 63* 63* 62*    219 256     Most Recent 3 BMP's:Recent Labs   Lab Test 03/24/22  1123 03/24/22  0732 03/24/22  0610 03/23/22  0904 03/23/22  0600 03/22/22  0335 03/21/22  0740   NA  --   --  134*  --  137  --  135*   POTASSIUM  --   --  4.4  --  4.4  --  4.3   CHLORIDE  --   --  105  --  104  --  100   CO2  --   --  22  --  23  --  23   BUN  --   --  14  --  12  --  12   CR  --   --  0.91   --  1.13  --  1.17   ANIONGAP  --   --  7  --  10  --  12   KERRI  --   --  8.4*  --  9.2  --  9.6   * 212* 230*   < > 251*   < > 260*    < > = values in this interval not displayed.     Most Recent 2 LFT's:Recent Labs   Lab Test 03/24/22  0610 03/23/22  0600   * 549*   * 719*   ALKPHOS 96 121*   BILITOTAL 2.9* 7.2*     Most Recent 3 Hemoglobins:Recent Labs   Lab Test 03/24/22  0610 03/23/22  0600 03/21/22  2135   HGB 9.8* 11.1* 12.7*   ,   Results for orders placed or performed during the hospital encounter of 03/21/22   Abdomen US, limited (RUQ only)    Narrative    EXAM: US ABDOMEN LIMITED  LOCATION: [facility]  DATE/TIME: 3/21/2022 10:58 PM    INDICATION: Sludge seen on CT scan earlier today.  No vomiting rule out cholelithiasis cholecystitis  COMPARISON: 03/21/2022  TECHNIQUE: Limited abdominal ultrasound.    FINDINGS:    GALLBLADDER: MYAH sign suggesting cholelithiasis. Sonographic Vaughan's sign negative. Gallbladder wall thickening, 5 mm.    BILE DUCTS: No biliary dilatation. The common duct measures 3 mm.    LIVER: Hepatic steatosis.    RIGHT KIDNEY: No hydronephrosis.    PANCREAS: Obscured by bowel gas.    No visible ascites.      Impression    IMPRESSION:  1.  Cholelithiasis.  2.  Mild gallbladder wall thickening where seen.  3.  Sonographic Vaughan's sign negative. No biliary dilatation.       XR Surgery ESTUARDO Fluoro L/T 5 Min    Narrative    This exam was marked as non-reportable because it will not be read by a   radiologist or a [place] non-radiologist provider.               Discharge Medications   Discharge Medication List as of 3/24/2022  2:57 PM      START taking these medications    Details   alcohol swab prep pads Use to swab area of injection/shelby as directed. Check blood glucose twice dailyDisp-100 each, [id number]      blood glucose (NO BRAND SPECIFIED) test strip Use to test blood sugar 2 times daily or as directed., Disp-50 strip, R-0,  E-PrescribeContour Next EZ strips      docusate sodium (COLACE) 100 MG capsule Take 1 capsule (100 mg) by mouth 2 times daily, Disp-30 capsule, R-0, E-Prescribe      HYDROcodone-acetaminophen (NORCO) 5-325 MG tablet Take 1 tablet by mouth every 6 hours as needed for pain, Disp-18 tablet, R-0, E-Prescribe         CONTINUE these medications which have NOT CHANGED    Details   metFORMIN (GLUCOPHAGE-XR) 500 MG 24 hr tablet Take 1 tablet (500 mg) by mouth 2 times daily (with meals), Disp-60 tablet, R-0, E-Prescribe      omeprazole (PRILOSEC) 20 MG DR capsule Take 1 capsule (20 mg) by mouth daily, Disp-30 capsule, R-0, E-Prescribe         STOP taking these medications       amoxicillin-clavulanate (AUGMENTIN) 875-125 MG tablet Comments:   Reason for Stopping:             Allergies   No Known Allergies

## 2022-03-30 ENCOUNTER — MYC MEDICAL ADVICE (OUTPATIENT)
Dept: FAMILY MEDICINE | Facility: CLINIC | Age: 62
End: 2022-03-30

## 2022-03-30 ENCOUNTER — OFFICE VISIT (OUTPATIENT)
Dept: FAMILY MEDICINE | Facility: CLINIC | Age: 62
End: 2022-03-30
Payer: COMMERCIAL

## 2022-03-30 VITALS
HEIGHT: 71 IN | SYSTOLIC BLOOD PRESSURE: 113 MMHG | OXYGEN SATURATION: 100 % | WEIGHT: 198 LBS | DIASTOLIC BLOOD PRESSURE: 80 MMHG | HEART RATE: 85 BPM | BODY MASS INDEX: 27.72 KG/M2 | TEMPERATURE: 98.2 F

## 2022-03-30 DIAGNOSIS — N48.1 BALANITIS: ICD-10-CM

## 2022-03-30 DIAGNOSIS — E11.65 TYPE 2 DIABETES MELLITUS WITH HYPERGLYCEMIA, WITHOUT LONG-TERM CURRENT USE OF INSULIN (H): Primary | ICD-10-CM

## 2022-03-30 DIAGNOSIS — Z83.3 FAMILY HISTORY OF TYPE 1 DIABETES MELLITUS: ICD-10-CM

## 2022-03-30 DIAGNOSIS — R74.8 ELEVATED LIVER ENZYMES: ICD-10-CM

## 2022-03-30 PROBLEM — E11.9 DIABETES MELLITUS, TYPE 2 (H): Status: ACTIVE | Noted: 2022-03-30

## 2022-03-30 LAB
ALBUMIN SERPL-MCNC: 3.8 G/DL (ref 3.4–5)
ALBUMIN UR-MCNC: NEGATIVE MG/DL
ALP SERPL-CCNC: 107 U/L (ref 40–150)
ALT SERPL W P-5'-P-CCNC: 192 U/L (ref 0–70)
ANION GAP SERPL CALCULATED.3IONS-SCNC: 10 MMOL/L (ref 3–14)
APPEARANCE UR: CLEAR
AST SERPL W P-5'-P-CCNC: 52 U/L (ref 0–45)
BILIRUB DIRECT SERPL-MCNC: 0.5 MG/DL (ref 0–0.2)
BILIRUB SERPL-MCNC: 1.1 MG/DL (ref 0.2–1.3)
BILIRUB UR QL STRIP: NEGATIVE
BUN SERPL-MCNC: 18 MG/DL (ref 7–30)
CALCIUM SERPL-MCNC: 10.4 MG/DL (ref 8.5–10.1)
CHLORIDE BLD-SCNC: 103 MMOL/L (ref 94–109)
CHOLEST SERPL-MCNC: 188 MG/DL
CO2 SERPL-SCNC: 24 MMOL/L (ref 20–32)
COLOR UR AUTO: YELLOW
CREAT SERPL-MCNC: 1.04 MG/DL (ref 0.66–1.25)
FASTING STATUS PATIENT QL REPORTED: YES
GFR SERPL CREATININE-BSD FRML MDRD: 81 ML/MIN/1.73M2
GLUCOSE BLD-MCNC: 218 MG/DL (ref 70–99)
GLUCOSE UR STRIP-MCNC: NEGATIVE MG/DL
HDLC SERPL-MCNC: 38 MG/DL
HGB UR QL STRIP: NEGATIVE
KETONES UR STRIP-MCNC: ABNORMAL MG/DL
LDLC SERPL CALC-MCNC: 119 MG/DL
LEUKOCYTE ESTERASE UR QL STRIP: NEGATIVE
NITRATE UR QL: NEGATIVE
NONHDLC SERPL-MCNC: 150 MG/DL
PH UR STRIP: 5.5 [PH] (ref 5–7)
POTASSIUM BLD-SCNC: 4.3 MMOL/L (ref 3.4–5.3)
PROT SERPL-MCNC: 8.3 G/DL (ref 6.8–8.8)
SODIUM SERPL-SCNC: 137 MMOL/L (ref 133–144)
SP GR UR STRIP: 1.02 (ref 1–1.03)
TRIGL SERPL-MCNC: 153 MG/DL
UROBILINOGEN UR STRIP-ACNC: 0.2 E.U./DL

## 2022-03-30 PROCEDURE — 80053 COMPREHEN METABOLIC PANEL: CPT | Performed by: PHYSICIAN ASSISTANT

## 2022-03-30 PROCEDURE — 81003 URINALYSIS AUTO W/O SCOPE: CPT | Performed by: PHYSICIAN ASSISTANT

## 2022-03-30 PROCEDURE — 82248 BILIRUBIN DIRECT: CPT | Performed by: PHYSICIAN ASSISTANT

## 2022-03-30 PROCEDURE — 86341 ISLET CELL ANTIBODY: CPT | Mod: 90 | Performed by: PHYSICIAN ASSISTANT

## 2022-03-30 PROCEDURE — 99000 SPECIMEN HANDLING OFFICE-LAB: CPT | Performed by: PHYSICIAN ASSISTANT

## 2022-03-30 PROCEDURE — 84681 ASSAY OF C-PEPTIDE: CPT | Performed by: PHYSICIAN ASSISTANT

## 2022-03-30 PROCEDURE — 36415 COLL VENOUS BLD VENIPUNCTURE: CPT | Performed by: PHYSICIAN ASSISTANT

## 2022-03-30 PROCEDURE — 99215 OFFICE O/P EST HI 40 MIN: CPT | Performed by: PHYSICIAN ASSISTANT

## 2022-03-30 PROCEDURE — 80061 LIPID PANEL: CPT | Performed by: PHYSICIAN ASSISTANT

## 2022-03-30 RX ORDER — ATORVASTATIN CALCIUM 40 MG/1
40 TABLET, FILM COATED ORAL DAILY
Qty: 90 TABLET | Refills: 0 | Status: SHIPPED | OUTPATIENT
Start: 2022-03-30 | End: 2022-06-08

## 2022-03-30 RX ORDER — LISINOPRIL 2.5 MG/1
2.5 TABLET ORAL DAILY
Qty: 90 TABLET | Refills: 0 | Status: SHIPPED | OUTPATIENT
Start: 2022-03-30 | End: 2022-06-08

## 2022-03-30 RX ORDER — FLUCONAZOLE 150 MG/1
150 TABLET ORAL ONCE
Qty: 1 TABLET | Refills: 0 | Status: SHIPPED | OUTPATIENT
Start: 2022-03-30 | End: 2022-03-30

## 2022-03-30 NOTE — NURSING NOTE
Patient being started on lantus 10 units at bedtime.  Information given to patient by provider.  RN instructed on how to do self injection subcutaneous insulin to patient and wife.  Handouts on lantus self injection and handout of subcutaneous injection given and reviewed with patient and spouse.  Discussed and practiced on demonstration lantus pen on how to prep pen, insulin solution clear, check expiration date, wash hands, attach pen need and give injection, disposal of used pen needles and empty pen.   Handout given on subcutaneous injection sites, will avoid abdomin site until he is fully recovered from surgery.  All questions answered, they know to call if any question or concerns arise.  He will set up an appt with diabetic ed.  Aneta Donato RN  [facility]

## 2022-03-30 NOTE — PROGRESS NOTES
SUBJECTIVE:   CC: Farhat Tejada is an 61 year old male who presents for  Hospital follow-up/new diabetes diagnosis (did not have time to do full physical today).     Patient has been advised of split billing requirements and indicates understanding: Yes  Healthy Habits:     Getting at least 3 servings of Calcium per day:  NO    Bi-annual eye exam:  NO    Dental care twice a year:  Yes    Sleep apnea or symptoms of sleep apnea:  None    Diet:  Diabetic    Frequency of exercise:  4-5 days/week    Duration of exercise:  45-60 minutes    Taking medications regularly:  Yes    Barriers to taking medications:  None    Medication side effects:  None    PHQ-2 Total Score: 0    Additional concerns today:  Yes    Healthy Habits:     Getting at least 3 servings of Calcium per day:  NO    Bi-annual eye exam:  NO    Dental care twice a year:  Yes    Sleep apnea or symptoms of sleep apnea:  None    Diet:  Diabetic    Frequency of exercise:  4-5 days/week    Duration of exercise:  Greater than 60 minutes    Taking medications regularly:  0    Barriers to taking medications:  None    Medication side effects:  None    PHQ-2 Total Score: 0    Additional concerns today:  Yes      Symptom onset:  More than a month pt has been dealing with symptoms of possible Balanitis   Symptoms include:  None after removal of gallbladder  Symptom intensity:  Severe  Symptom progression:  Improving  Had these symptoms before:  Yes  Has tried/received treatment for these symptoms:  No  What makes it worse:  Unk  What makes it better:  Unk    He eats 4 or more servings of fruits and vegetables daily.He consumes 1 sweetened beverage(s) daily.He exercises with enough effort to increase his heart rate 20 to 29 minutes per day.  He exercises with enough effort to increase his heart rate 4 days per week.   He is taking medications regularly.  He is not taking prescribed medications regularly due to None.    Diabetes:  A1C on 3/18/22 was 11.0.  Started on  metformin 500 mg BID for about a week now, tolerating it fine (some mild diarrhea at first).    A few days after being seen in the clinic, he developed stomach pain/cholecystitis.  Cholecystectomy done and discharged 3/24/22.    Met with diabetes ed while in the hospital and was getting short acting insulin shots.      Father with Type 1 diabetes, as well as a few other family members (who live in [place], but he thinks they all have Type 1).       He has been losing weight over the past 4 months.      He was seen on 3/18/22 due to Balanitis and treated with antibiotics and diflucan.   Since hospital, using monistat topical for 7 days.  Was getting better, but now feels it is getting swollen and irritated.          Today's PHQ-2 Score:   PHQ-2 ( 1999 [employer]) 3/29/2022   Q1: Little interest or pleasure in doing things 0   Q2: Feeling down, depressed or hopeless 0   PHQ-2 Score 0   PHQ-2 Total Score (12-17 Years)- Positive if 3 or more points; Administer PHQ-A if positive -   Q1: Little interest or pleasure in doing things Not at all   Q2: Feeling down, depressed or hopeless Not at all   PHQ-2 Score 0             Social History     Tobacco Use     Smoking status: Never Smoker     Smokeless tobacco: Never Used   Substance Use Topics     Alcohol use: Yes         No flowsheet data found.    Last PSA:   PSA   Date Value Ref Range Status   01/10/2020 0.70 0 - 4 ug/L Final     Comment:     Assay Method:  Chemiluminescence using Siemens Vista analyzer       Reviewed orders with patient. Reviewed health maintenance and updated orders accordingly - Yes  Lab work is in process    Reviewed and updated as needed this visit by clinical staff   Tobacco  Allergies  Meds              Reviewed and updated as needed this visit by Provider                     Review of Systems  CONSTITUTIONAL: NEGATIVE for fever, chills, change in weight  INTEGUMENTARY/SKIN: NEGATIVE for worrisome rashes, moles or lesions  EYES: NEGATIVE for vision  "changes or irritation  ENT: NEGATIVE for ear, mouth and throat problems  RESP: NEGATIVE for significant cough or SOB  CV: NEGATIVE for chest pain, palpitations or peripheral edema  GI: NEGATIVE for nausea, abdominal pain, heartburn, or change in bowel habits   male: negative for dysuria, hematuria, decreased urinary stream, erectile dysfunction, urethral discharge  MUSCULOSKELETAL: NEGATIVE for significant arthralgias or myalgia  NEURO: NEGATIVE for weakness, dizziness or paresthesias  PSYCHIATRIC: NEGATIVE for changes in mood or affect    OBJECTIVE:   /80 (BP Location: Left arm, Patient Position: Chair, Cuff Size: Adult Large)   Pulse 85   Temp 98.2  F (36.8  C) (Tympanic)   Ht 1.791 m (5' 10.5\")   Wt 89.8 kg (198 lb)   SpO2 100%   BMI 28.01 kg/m      Physical Exam  GENERAL: healthy, alert and no distress  EYES: Eyes grossly normal to inspection, PERRL and conjunctivae and sclerae normal  HENT: ear canals and TM's normal, nose and mouth without ulcers or lesions  NECK: no adenopathy, no asymmetry, masses, or scars and thyroid normal to palpation  RESP: lungs clear to auscultation - no rales, rhonchi or wheezes  CV: regular rate and rhythm, normal S1 S2, no S3 or S4, no murmur, click or rub, no peripheral edema and peripheral pulses strong  ABDOMEN: soft, nontender, no hepatosplenomegaly, no masses and bowel sounds normal, laparoscopic surgical incisions healing well, no signs of infection   (male): normal male genitalia without lesions or urethral discharge, scaly slightly swollen rash noted on penis.   MS: no gross musculoskeletal defects noted, no edema  SKIN: no suspicious lesions or rashes  NEURO: Normal strength and tone, mentation intact and speech normal  PSYCH: mentation appears normal, affect normal/bright      Diagnostic Test Results:  Labs reviewed in Epic  Results for orders placed or performed in visit on 03/30/22 (from the past 24 hour(s))   Hepatic panel (Albumin, ALT, AST, Bili, Alk " Phos, TP)   Result Value Ref Range    Bilirubin Total 1.1 0.2 - 1.3 mg/dL    Bilirubin Direct 0.5 (H) 0.0 - 0.2 mg/dL    Protein Total 8.3 6.8 - 8.8 g/dL    Albumin 3.8 3.4 - 5.0 g/dL    Alkaline Phosphatase 107 40 - 150 U/L    AST 52 (H) 0 - 45 U/L     (H) 0 - 70 U/L   Basic metabolic panel  (Ca, Cl, CO2, Creat, Gluc, K, Na, BUN)   Result Value Ref Range    Sodium 137 133 - 144 mmol/L    Potassium 4.3 3.4 - 5.3 mmol/L    Chloride 103 94 - 109 mmol/L    Carbon Dioxide (CO2) 24 20 - 32 mmol/L    Anion Gap 10 3 - 14 mmol/L    Urea Nitrogen 18 7 - 30 mg/dL    Creatinine 1.04 0.66 - 1.25 mg/dL    Calcium 10.4 (H) 8.5 - 10.1 mg/dL    Glucose 218 (H) 70 - 99 mg/dL    GFR Estimate 81 >60 mL/min/1.73m2   Lipid panel reflex to direct LDL Fasting   Result Value Ref Range    Cholesterol 188 <200 mg/dL    Triglycerides 153 (H) <150 mg/dL    Direct Measure HDL 38 (L) >=40 mg/dL    LDL Cholesterol Calculated 119 (H) <=100 mg/dL    Non HDL Cholesterol 150 (H) <130 mg/dL    Patient Fasting > 8hrs? Yes     Narrative    Cholesterol  Desirable:  <200 mg/dL    Triglycerides  Normal:  Less than 150 mg/dL  Borderline High:  150-199 mg/dL  High:  200-499 mg/dL  Very High:  Greater than or equal to 500 mg/dL    Direct Measure HDL  Female:  Greater than or equal to 50 mg/dL   Male:  Greater than or equal to 40 mg/dL    LDL Cholesterol  Desirable:  <100mg/dL  Above Desirable:  100-129 mg/dL   Borderline High:  130-159 mg/dL   High:  160-189 mg/dL   Very High:  >= 190 mg/dL    Non HDL Cholesterol  Desirable:  130 mg/dL  Above Desirable:  130-159 mg/dL  Borderline High:  160-189 mg/dL  High:  190-219 mg/dL  Very High:  Greater than or equal to 220 mg/dL   UA Macro with Reflex to Micro and Culture - lab collect    Specimen: Urine, Clean Catch   Result Value Ref Range    Color Urine Yellow Colorless, Straw, Light Yellow, Yellow    Appearance Urine Clear Clear    Glucose Urine Negative Negative mg/dL    Bilirubin Urine Negative Negative     Ketones Urine Trace (A) Negative mg/dL    Specific Gravity Urine 1.025 1.003 - 1.035    Blood Urine Negative Negative    pH Urine 5.5 5.0 - 7.0    Protein Albumin Urine Negative Negative mg/dL    Urobilinogen Urine 0.2 0.2, 1.0 E.U./dL    Nitrite Urine Negative Negative    Leukocyte Esterase Urine Negative Negative    Narrative    Microscopic not indicated       ASSESSMENT/PLAN:   (E11.65) Type 2 diabetes mellitus with hyperglycemia, without long-term current use of insulin (H)  (primary encounter diagnosis)  Comment:   I am highly suspicious of JOAN given the quick onset, weight loss and family history.  Will check labs today for autoantibodies and start lantus.   If Type 1 is confirmed, will refer to endo for insulin titration.  If Type 2 is confirmed, may consider switching lantus to a GLP-1 agonist.       Extensive discussion provided about new diagnosis of diabetes, including the pathophysiology, medications (including side effects), goals of treatment, long term complications of diabetes, lab monitoring, nutrition and exercise.  Discussed goals of diabetes treatment, including blood pressure management, LDL control, cardiovascular prevention with aspirin, smoking risks, and general prevention of nephropathy, retinopathy and cardiovascular outcomes.                       Plan: Hepatic panel (Albumin, ALT, AST, Bili, Alk         Phos, TP), insulin glargine (LANTUS PEN) 100         UNIT/ML pen, aspirin (ASA) 81 MG EC tablet,         Basic metabolic panel  (Ca, Cl, CO2, Creat,         Gluc, K, Na, BUN), UA Macro with Reflex to         Micro and Culture - lab collect, C-peptide,         Glutamic acid decarboxylase antibody, Islet         Antigen (IA-2) Autoantibody, Serum, Islet cell         antibody IgG, Lipid panel reflex to direct LDL         Fasting, AMB Adult Diabetes Educator Referral,         atorvastatin (LIPITOR) 40 MG tablet, lisinopril        (ZESTRIL) 2.5 MG tablet, insulin pen needle         (31G X 8  MM) 31G X 8 MM miscellaneous            (R74.8) Elevated liver enzymes  Comment: trending down.   Plan:     (N48.1) Balanitis  Comment: will treat with another dose of diflucan.  I expect this to improve once sugars come down.   Plan: fluconazole (DIFLUCAN) 150 MG tablet            (Z83.3) Family history of type 1 diabetes mellitus  Comment: multiple family members with Type 1.   Plan:       65 minutes spent on the date of the encounter doing chart review, history and exam, documentation and further activities per the note     Nurse provided insulin training in clinic today after provider visit.     Follow up 10 days to review labs and recheck insulin.  F/u with diabetes education.          GRECIA Davila [facility]

## 2022-03-30 NOTE — PATIENT INSTRUCTIONS
Living with diabetes is hard work every day.  We recognize this. Our efforts are to help you reach and maintain the following goals:    HbA1c completed every 6 months, if your result is greater than 7.0 we would like this test completed every 3 months.  HbA1c level of less than 7.0  LDL (bad cholesterol) completed yearly  Microalbumin completed yearly, with a level less than 25.  Maintain a blood pressure of less than 130/80  Yearly eye exam.    ASSESSMENT: at this time:  DIABETES Type II:                      Control   Needs improvement     Lab Results   Component Value Date    A1C 11.0 03/18/2022    A1C 6.0 08/23/2019               HTN:                                              Control   good   Last BP: 113/80          HYPERCHOLESTEROLEMIA:   Control   Checking today.     Lab Results   Component Value Date     01/10/2020           Heart disease:    no       PLAN:    insulin use reviewed (start with 10 units of lantus at bedtime and continue with metformin as is), diabetic education consult  We'll see what the antibody testing shows (this would change how we approach treating the sugars).  We'll review over the phone at your follow-up visit.     Monitor sugars throughout the day (3-4 times per day to start).  Before meals, 2 hours after meals, fasting in the morning and before bed.

## 2022-03-31 LAB — C PEPTIDE SERPL-MCNC: 3.5 NG/ML (ref 0.9–6.9)

## 2022-04-01 LAB — PANC ISLET CELL AB TITR SER: NORMAL {TITER}

## 2022-04-03 LAB — GAD65 AB SER IA-ACNC: <5 IU/ML

## 2022-04-04 LAB — ISLET CELL512 AB SER IA-ACNC: <5.4 U/ML

## 2022-04-06 ENCOUNTER — VIRTUAL VISIT (OUTPATIENT)
Dept: FAMILY MEDICINE | Facility: CLINIC | Age: 62
End: 2022-04-06
Payer: COMMERCIAL

## 2022-04-06 ENCOUNTER — MYC REFILL (OUTPATIENT)
Dept: FAMILY MEDICINE | Facility: CLINIC | Age: 62
End: 2022-04-06

## 2022-04-06 DIAGNOSIS — E78.5 HYPERLIPIDEMIA LDL GOAL <160: ICD-10-CM

## 2022-04-06 DIAGNOSIS — E11.65 TYPE 2 DIABETES MELLITUS WITH HYPERGLYCEMIA, WITHOUT LONG-TERM CURRENT USE OF INSULIN (H): Primary | ICD-10-CM

## 2022-04-06 PROCEDURE — 99214 OFFICE O/P EST MOD 30 MIN: CPT | Mod: 95 | Performed by: PHYSICIAN ASSISTANT

## 2022-04-06 RX ORDER — METFORMIN HCL 500 MG
1000 TABLET, EXTENDED RELEASE 24 HR ORAL 2 TIMES DAILY WITH MEALS
Qty: 360 TABLET | Refills: 0 | Status: SHIPPED | OUTPATIENT
Start: 2022-04-06 | End: 2022-06-08

## 2022-04-06 NOTE — PROGRESS NOTES
"Farhat is a 61 year old who is being evaluated via a billable telephone visit.      What phone number would you like to be contacted at? [phone]  How would you like to obtain your AVS? Jamar    Assessment & Plan     (E11.65) Type 2 diabetes mellitus with hyperglycemia, without long-term current use of insulin (H)  (primary encounter diagnosis)  Comment: Reviewed recent lab testing for potential Type 1 diabetes (given onset and family history, type 1 was suspected), antibodies came back negative therefore Type 2 diabetes confirmed.     Sugars are coming down nicely, and perhaps a bit too quick as he is having hypoglycemic symptoms (as baseline glucose level comes down).     I suggest increasing Metformin from 500 mg BID to 500 mg in the am and 1000 mg in pm x 2 weeks and then increase to 1000 mg BID if tolerated.      Will cut lantus down from 10 units daily to 8 units daily.     He is to continue to monitor sugar closely, especially as he goes away on vacation next week.     Long term plan will be to get off the lantus and use a 2nd line medication if needed (such as a GLP-1 agonist if covered).       Plan: blood glucose (NO BRAND SPECIFIED) test strip,         insulin glargine (LANTUS PEN) 100 UNIT/ML pen,         metFORMIN (GLUCOPHAGE-XR) 500 MG 24 hr tablet,         Hemoglobin A1c, Comprehensive metabolic panel,         Albumin Random Urine Quantitative with Creat         Ratio            (E78.5) Hyperlipidemia LDL goal <160  Comment: tolerating statin.   Plan:         29 minutes spent on the date of the encounter doing chart review, history and exam, documentation and further activities per the note       BMI:   Estimated body mass index is 28.01 kg/m  as calculated from the following:    Height as of 3/30/22: 1.791 m (5' 10.5\").    Weight as of 3/30/22: 89.8 kg (198 lb).   Weight management plan: Discussed healthy diet and exercise guidelines        Return in about 2 months (around 6/6/2022) for Diabetes " recheck (non-fasting labs same day).    GRECIA Davila [facility] ASHLEE Dougherty is a 61 year old who presents for the following health issues     HPI     Recently diagnosed diabetic pt presents to follow-up with lab work and blood sugar readings from home.     Diabetes Follow-up  metFORMIN (GLUCOPHAGE-XR) 500 MG 24 hr tablet and Insulin glargine (LANTUS PEN) 100 UNIT/ML pen  How often are you checking your blood sugar? Four or more times daily  Blood sugar testing frequency justification:  Uncontrolled diabetes  What time of day are you checking your blood sugars (select all that apply)?  Before and after meals  Have you had any blood sugars above 200?  Yes highest reading was at 364 on March 27th since then readings were ranging around the mid 200's and in the last couple days readings have retained averages around 100 to 150.   Have you had any blood sugars below 70?  No    What symptoms do you notice when your blood sugar is low?  Patient reports he was feeling 'fuzzy' yesterday with lingering headaches, today is feeling much better, blood sugar reading yesterday was 222 but after some time readings gradually went down to 190-160.    What concerns do you have today about your diabetes? Blood sugar is often over 200     Do you have any of these symptoms? (Select all that apply)  Numbness in feet    Have you had a diabetic eye exam in the last 12 months? No    Currently on 10 units of lantus before bed. Highest reading in the morning.      Metformin BID (breakfast and dinner).    BP Readings from Last 2 Encounters:   03/30/22 113/80   03/24/22 (!) 152/83     Hemoglobin A1C POCT (%)   Date Value   08/23/2019 6.0 (H)   02/11/2019 6.1 (H)     Hemoglobin A1C (%)   Date Value   03/18/2022 11.0 (H)     LDL Cholesterol Calculated (mg/dL)   Date Value   03/30/2022 119 (H)   01/10/2020 107 (H)   09/05/2018 129 (H)           How many servings of fruits and vegetables do you eat daily?   2-3    On average, how many sweetened beverages do you drink each day (Examples: soda, juice, sweet tea, etc.  Do NOT count diet or artificially sweetened beverages)?   0    How many days per week do you exercise enough to make your heart beat faster? 3 or less    How many minutes a day do you exercise enough to make your heart beat faster? 9 or less    How many days per week do you miss taking your medication? 0        Review of Systems   Constitutional, HEENT, cardiovascular, pulmonary, gi and gu systems are negative, except as otherwise noted.      Objective           Vitals:  No vitals were obtained today due to virtual visit.    Physical Exam   healthy, alert and no distress  PSYCH: Alert and oriented times 3; coherent speech, normal   rate and volume, able to articulate logical thoughts, able   to abstract reason, no tangential thoughts, no hallucinations   or delusions  His affect is normal  RESP: No cough, no audible wheezing, able to talk in full sentences  Remainder of exam unable to be completed due to telephone visits    No results found for any visits on 04/06/22.            Phone call duration: 25 minutes

## 2022-04-08 PROBLEM — R73.03 PREDIABETES: Status: RESOLVED | Noted: 2018-09-07 | Resolved: 2022-04-08

## 2022-04-20 ENCOUNTER — VIRTUAL VISIT (OUTPATIENT)
Dept: EDUCATION SERVICES | Facility: CLINIC | Age: 62
End: 2022-04-20
Attending: PHYSICIAN ASSISTANT
Payer: COMMERCIAL

## 2022-04-20 DIAGNOSIS — E11.65 TYPE 2 DIABETES MELLITUS WITH HYPERGLYCEMIA, WITHOUT LONG-TERM CURRENT USE OF INSULIN (H): ICD-10-CM

## 2022-04-20 PROCEDURE — G0108 DIAB MANAGE TRN  PER INDIV: HCPCS | Mod: 95 | Performed by: DIETITIAN, REGISTERED

## 2022-04-20 NOTE — LETTER
4/20/2022         RE: Farhat Tejada  [address] [address]        Dear Colleague,    Thank you for referring your patient, Farhat Tejada, to the [facility]. Please see a copy of my visit note below.    Type of Service: Telephone Visit    Originating Location (Patient Location): Home  Distant Location (Provider Location): [facility]  Mode of Communication:  Telephone    How would patient like to obtain AVS? MyChart       Diabetes Self-Management Education & Support    Presents for: Initial Assessment for new diagnosis    SUBJECTIVE/OBJECTIVE:  Presents for: Initial Assessment for new diagnosis  Accompanied by: Self  Diabetes education in the past 24mo: No  Focus of Visit: Monitoring, Healthy Eating, Being Active, Taking Medication, Diabetes Pathophysiology  Diabetes type: Type 2  Date of diagnosis: 2022  Disease course: Improving  How confident are you filling out medical forms by yourself:: Not Assessed  Diabetes management related comments/concerns: Eventual goal to get off insulin  Difficulty affording diabetes medication?: No  Difficulty affording diabetes testing supplies?: No  Other concerns:: None  Cultural Influences/Ethnic Background:  Not  or     Diabetes Symptoms & Complications:  Fatigue: Yes  Neuropathy: Yes  Polydipsia: No  Polyphagia: Sometimes  Polyuria: No  Visual change: Yes  Slow healing wounds: Yes  Other: Yes (Went in when dx with east infection)  Symptom course: Improving  Weight trend: Decreasing  Complications assessed today?: Yes  Autonomic neuropathy: No  CVA: No  Heart disease: No  Nephropathy: No  Peripheral neuropathy: Yes  Peripheral Vascular Disease: No  Retinopathy: No  Sexual dysfunction: No    Patient Problem List and Family Medical History reviewed for relevant medical history, current medical status, and diabetes risk factors.    Vitals:  There were no vitals taken for this  "visit.  Estimated body mass index is 28.01 kg/m  as calculated from the following:    Height as of 3/30/22: 1.791 m (5' 10.5\").    Weight as of 3/30/22: 89.8 kg (198 lb).   Last 3 BP:   BP Readings from Last 3 Encounters:   03/30/22 113/80   03/24/22 (!) 152/83   03/21/22 (!) (P) 144/92       History   Smoking Status     Never Smoker   Smokeless Tobacco     Never Used       Labs:  Lab Results   Component Value Date    A1C 11.0 03/18/2022    A1C 6.0 08/23/2019     Lab Results   Component Value Date     03/30/2022     01/10/2020     Lab Results   Component Value Date     03/30/2022     01/10/2020     HDL Cholesterol   Date Value Ref Range Status   01/10/2020 54 >39 mg/dL Final     Direct Measure HDL   Date Value Ref Range Status   03/30/2022 38 (L) >=40 mg/dL Final   ]  GFR Estimate   Date Value Ref Range Status   03/30/2022 81 >60 mL/min/1.73m2 Final     Comment:     Effective December 21, 2021 eGFRcr in adults is calculated using the 2021 CKD-EPI creatinine equation which includes age and gender (Callie et al., NEJ, DOI: 10.1056/DOYVua7320652)   01/10/2020 79 >60 mL/min/[1.73_m2] Final     Comment:     Non  GFR Calc  Starting 12/18/2018, serum creatinine based estimated GFR (eGFR) will be   calculated using the Chronic Kidney Disease Epidemiology Collaboration   (CKD-EPI) equation.       GFR Estimate If Black   Date Value Ref Range Status   01/10/2020 >90 >60 mL/min/[1.73_m2] Final     Comment:      GFR Calc  Starting 12/18/2018, serum creatinine based estimated GFR (eGFR) will be   calculated using the Chronic Kidney Disease Epidemiology Collaboration   (CKD-EPI) equation.       Lab Results   Component Value Date    CR 1.04 03/30/2022    CR 1.02 01/10/2020     No results found for: MICROALBUMIN    Healthy Eating:  Healthy Eating Assessed Today: Yes  Cultural/Restoration diet restrictions?: No  Meal planning/habits: Smaller portions, Keeps food records  Meals " include: Breakfast, Lunch, Dinner  Breakfast: Sweet bread and coffee  black (easter) OR bowl of oatmeal (plain) with almond milk and tiny piece of sweet bread, has stevia if wants to use some sweetener OR life multigrain cereal (cinnamon flavored) with almond milk and some of the sweet bread  Lunch: Ham, cheesy potatoes, coleslaw made with vinegar, ilan game hen, fritata, fruit roast peppers with 2 slices of bread, coffee cake (Easter) OR Pepperoni sandiwch (similar to salami), sun chips, and coffee cake OR pepporoni cheese sanddwich with roasted peppers and sun chips  Dinner: Ham sandwich OR easter leftovers OR grilled cheese with bowl of split pea soup and salad and fruit  Snacks: Sugar free pudding cups (2) with sugar free cool whip on it OR 3 sugar free wafers OR apple OR sugar free jello cups with sugar free cool whip  Other: Recently had Gall Bladder surgery and noticed difficulty with foods right after, now doing okay; has gone to sugar free stuff  Beverages: Alcohol, Sports drinks, Coffee, Water, Tea (Red wine (dry), sports drinks sugar free, almond milk unsweetened, unsweetened tea)  Has patient met with a dietitian in the past?: No    Being Active:  Being Active Assessed Today: Yes  Exercise:: Yes  Days per week of moderate to strenuous exercise (like a brisk walk): 5  On average, minutes per day of exercise at this level: 30  How intense was your typical exercise? : Moderate (like brisk walking)  Exercise Minutes per Week: 150  Barrier to exercise: None    Monitoring:  Monitoring Assessed Today: Yes  Did patient bring glucose meter to appointment? : Yes  Blood Glucose Meter: ContourNext  Times checking blood sugar at home (number): 4  Times checking blood sugar at home (per): Day  Blood glucose trend: Decreasing    Date Breakfast  Lunch  Dinner  Bedtime    Before After Before After Before After    4/20 155         4/19 143  153  119  161   4/18 161  156  165  110   4/17 127  139  149  130   4/16 135     126  146   4/15 129  114  158  199   4/14 144  145  135  139     Just started 2 tablets twice daily with metformin xr this past week - diarrhea has gone away    Lantus 8 units nightly      Taking Medications:  Diabetes Medication(s)     Biguanides       metFORMIN (GLUCOPHAGE-XR) 500 MG 24 hr tablet    Take 2 tablets (1,000 mg) by mouth 2 times daily (with meals)    Insulin       insulin glargine (LANTUS PEN) 100 UNIT/ML pen    Inject 8 Units Subcutaneous At Bedtime          Taking Medication Assessed Today: Yes  Current Treatments: Diet, Oral Medication (taken by mouth)  Problems taking diabetes medications regularly?: No  Diabetes medication side effects?: No    Problem Solving:  Problem Solving Assessed Today: Yes  Is the patient at risk for hypoglycemia?: No  Is the patient at risk for DKA?: No    Reducing Risks:  Reducing Risks Assessed Today: Yes  Diabetes Risks: Family History, Hyperlipidemia, Age over 45 years  CAD Risks: Diabetes Mellitus, Obesity, Male sex  Has dilated eye exam at least once a year?: Yes  Sees dentist every 6 months?: Yes  Feet checked by healthcare provider in the last year?: No    Healthy Coping:  Healthy Coping Assessed Today: Yes  Emotional response to diabetes: Acceptance, Ready to learn  Stage of change: ACTION (Actively working towards change)  Patient Activation Measure Survey Score:  No flowsheet data found.    Diabetes knowledge and skills assessment:   Patient is knowledgeable in diabetes management concepts related to: Monitoring and Taking Medication    Patient needs further education on the following diabetes management concepts: Healthy Eating, Being Active, Monitoring and Taking Medication    Based on learning assessment above, most appropriate setting for further diabetes education would be: Individual setting.      INTERVENTIONS:    Education provided today on:  AADE Self-Care Behaviors:  Diabetes Pathophysiology  Healthy Eating: carbohydrate counting, consistency in  amount, composition, and timing of food intake, portion control, plate planning method and label reading  Being Active: relationship to blood glucose  Monitoring: individual blood glucose targets and frequency of monitoring  Taking Medication: action of prescribed medication, side effects of prescribed medications and when to take medications    Opportunities for ongoing education and support in diabetes-self management were discussed.    Pt verbalized understanding of concepts discussed and recommendations provided today.       Education Materials Provided:  [employer] Healthy Living with Diabetes Book, Safe Disposal Options for Needles & Syringes, BG Log Sheet, Carbohydrate Counting, Hypoglycemia Signs and Symptoms and My Plate Planner      ASSESSMENT:  Pt is newly dx with DM.  Pt's blood sugar improving.  Tolerating max dose metformin xr per day.  Has reduced insulin.  Gave guidelines for reducing insulin more as needed.  New dx education introduced.  Plan to follow up in a little over a month.        Patient's most recent   Lab Results   Component Value Date    A1C 11.0 03/18/2022    A1C 6.0 08/23/2019    is not meeting goal of <7.0    PLAN  See Patient Instructions for co-developed, patient-stated behavior change goals.  AVS printed and provided to patient today. See Follow-Up section for recommended follow-up.      Time Spent: 60 minutes  Encounter Type: Individual    Any diabetes medication dose changes were made via the CDE Protocol and Collaborative Practice Agreement with the patient's referring provider. A copy of this encounter was shared with the provider.    Rina Gupta RD [facility]  [phone]

## 2022-04-20 NOTE — PROGRESS NOTES
"Type of Service: Telephone Visit    Originating Location (Patient Location): Home  Distant Location (Provider Location): [facility]  Mode of Communication:  Telephone    How would patient like to obtain AVS? Jamar       Diabetes Self-Management Education & Support    Presents for: Initial Assessment for new diagnosis    SUBJECTIVE/OBJECTIVE:  Presents for: Initial Assessment for new diagnosis  Accompanied by: Self  Diabetes education in the past 24mo: No  Focus of Visit: Monitoring, Healthy Eating, Being Active, Taking Medication, Diabetes Pathophysiology  Diabetes type: Type 2  Date of diagnosis: 2022  Disease course: Improving  How confident are you filling out medical forms by yourself:: Not Assessed  Diabetes management related comments/concerns: Eventual goal to get off insulin  Difficulty affording diabetes medication?: No  Difficulty affording diabetes testing supplies?: No  Other concerns:: None  Cultural Influences/Ethnic Background:  Not  or     Diabetes Symptoms & Complications:  Fatigue: Yes  Neuropathy: Yes  Polydipsia: No  Polyphagia: Sometimes  Polyuria: No  Visual change: Yes  Slow healing wounds: Yes  Other: Yes (Went in when dx with east infection)  Symptom course: Improving  Weight trend: Decreasing  Complications assessed today?: Yes  Autonomic neuropathy: No  CVA: No  Heart disease: No  Nephropathy: No  Peripheral neuropathy: Yes  Peripheral Vascular Disease: No  Retinopathy: No  Sexual dysfunction: No    Patient Problem List and Family Medical History reviewed for relevant medical history, current medical status, and diabetes risk factors.    Vitals:  There were no vitals taken for this visit.  Estimated body mass index is 28.01 kg/m  as calculated from the following:    Height as of 3/30/22: 1.791 m (5' 10.5\").    Weight as of 3/30/22: 89.8 kg (198 lb).   Last 3 BP:   BP Readings from Last 3 Encounters:   03/30/22 113/80   03/24/22 (!) 152/83 "   03/21/22 (!) (P) 144/92       History   Smoking Status     Never Smoker   Smokeless Tobacco     Never Used       Labs:  Lab Results   Component Value Date    A1C 11.0 03/18/2022    A1C 6.0 08/23/2019     Lab Results   Component Value Date     03/30/2022     01/10/2020     Lab Results   Component Value Date     03/30/2022     01/10/2020     HDL Cholesterol   Date Value Ref Range Status   01/10/2020 54 >39 mg/dL Final     Direct Measure HDL   Date Value Ref Range Status   03/30/2022 38 (L) >=40 mg/dL Final   ]  GFR Estimate   Date Value Ref Range Status   03/30/2022 81 >60 mL/min/1.73m2 Final     Comment:     Effective December 21, 2021 eGFRcr in adults is calculated using the 2021 CKD-EPI creatinine equation which includes age and gender (Callie portillo al., NEJ, DOI: 10.1056/TDOTjt8400252)   01/10/2020 79 >60 mL/min/[1.73_m2] Final     Comment:     Non  GFR Calc  Starting 12/18/2018, serum creatinine based estimated GFR (eGFR) will be   calculated using the Chronic Kidney Disease Epidemiology Collaboration   (CKD-EPI) equation.       GFR Estimate If Black   Date Value Ref Range Status   01/10/2020 >90 >60 mL/min/[1.73_m2] Final     Comment:      GFR Calc  Starting 12/18/2018, serum creatinine based estimated GFR (eGFR) will be   calculated using the Chronic Kidney Disease Epidemiology Collaboration   (CKD-EPI) equation.       Lab Results   Component Value Date    CR 1.04 03/30/2022    CR 1.02 01/10/2020     No results found for: MICROALBUMIN    Healthy Eating:  Healthy Eating Assessed Today: Yes  Cultural/Presybeterian diet restrictions?: No  Meal planning/habits: Smaller portions, Keeps food records  Meals include: Breakfast, Lunch, Dinner  Breakfast: Sweet bread and coffee  black (easter) OR bowl of oatmeal (plain) with almond milk and tiny piece of sweet bread, has stevia if wants to use some sweetener OR life multigrain cereal (cinnamon flavored) with almond  milk and some of the sweet bread  Lunch: Ham, cheesy potatoes, coleslaw made with vinegar, ilan game hen, fritata, fruit roast peppers with 2 slices of bread, coffee cake (Easter) OR Pepperoni sandiwch (similar to salami), sun chips, and coffee cake OR pepporoni cheese sanddwich with roasted peppers and sun chips  Dinner: Ham sandwich OR easter leftovers OR grilled cheese with bowl of split pea soup and salad and fruit  Snacks: Sugar free pudding cups (2) with sugar free cool whip on it OR 3 sugar free wafers OR apple OR sugar free jello cups with sugar free cool whip  Other: Recently had Gall Bladder surgery and noticed difficulty with foods right after, now doing okay; has gone to sugar free stuff  Beverages: Alcohol, Sports drinks, Coffee, Water, Tea (Red wine (dry), sports drinks sugar free, almond milk unsweetened, unsweetened tea)  Has patient met with a dietitian in the past?: No    Being Active:  Being Active Assessed Today: Yes  Exercise:: Yes  Days per week of moderate to strenuous exercise (like a brisk walk): 5  On average, minutes per day of exercise at this level: 30  How intense was your typical exercise? : Moderate (like brisk walking)  Exercise Minutes per Week: 150  Barrier to exercise: None    Monitoring:  Monitoring Assessed Today: Yes  Did patient bring glucose meter to appointment? : Yes  Blood Glucose Meter: ContourNext  Times checking blood sugar at home (number): 4  Times checking blood sugar at home (per): Day  Blood glucose trend: Decreasing    Date Breakfast  Lunch  Dinner  Bedtime    Before After Before After Before After    4/20 155         4/19 143  153  119  161   4/18 161  156  165  110   4/17 127  139  149  130   4/16 135    126  146   4/15 129  114  158  199   4/14 144  145  135  139     Just started 2 tablets twice daily with metformin xr this past week - diarrhea has gone away    Lantus 8 units nightly      Taking Medications:  Diabetes Medication(s)     Biguanides        metFORMIN (GLUCOPHAGE-XR) 500 MG 24 hr tablet    Take 2 tablets (1,000 mg) by mouth 2 times daily (with meals)    Insulin       insulin glargine (LANTUS PEN) 100 UNIT/ML pen    Inject 8 Units Subcutaneous At Bedtime          Taking Medication Assessed Today: Yes  Current Treatments: Diet, Oral Medication (taken by mouth)  Problems taking diabetes medications regularly?: No  Diabetes medication side effects?: No    Problem Solving:  Problem Solving Assessed Today: Yes  Is the patient at risk for hypoglycemia?: No  Is the patient at risk for DKA?: No    Reducing Risks:  Reducing Risks Assessed Today: Yes  Diabetes Risks: Family History, Hyperlipidemia, Age over 45 years  CAD Risks: Diabetes Mellitus, Obesity, Male sex  Has dilated eye exam at least once a year?: Yes  Sees dentist every 6 months?: Yes  Feet checked by healthcare provider in the last year?: No    Healthy Coping:  Healthy Coping Assessed Today: Yes  Emotional response to diabetes: Acceptance, Ready to learn  Stage of change: ACTION (Actively working towards change)  Patient Activation Measure Survey Score:  No flowsheet data found.    Diabetes knowledge and skills assessment:   Patient is knowledgeable in diabetes management concepts related to: Monitoring and Taking Medication    Patient needs further education on the following diabetes management concepts: Healthy Eating, Being Active, Monitoring and Taking Medication    Based on learning assessment above, most appropriate setting for further diabetes education would be: Individual setting.      INTERVENTIONS:    Education provided today on:  AADE Self-Care Behaviors:  Diabetes Pathophysiology  Healthy Eating: carbohydrate counting, consistency in amount, composition, and timing of food intake, portion control, plate planning method and label reading  Being Active: relationship to blood glucose  Monitoring: individual blood glucose targets and frequency of monitoring  Taking Medication: action of  prescribed medication, side effects of prescribed medications and when to take medications    Opportunities for ongoing education and support in diabetes-self management were discussed.    Pt verbalized understanding of concepts discussed and recommendations provided today.       Education Materials Provided:  [place] Healthy Living with Diabetes Book, Safe Disposal Options for Needles & Syringes, BG Log Sheet, Carbohydrate Counting, Hypoglycemia Signs and Symptoms and My Plate Planner      ASSESSMENT:  Pt is newly dx with DM.  Pt's blood sugar improving.  Tolerating max dose metformin xr per day.  Has reduced insulin.  Gave guidelines for reducing insulin more as needed.  New dx education introduced.  Plan to follow up in a little over a month.        Patient's most recent   Lab Results   Component Value Date    A1C 11.0 03/18/2022    A1C 6.0 08/23/2019    is not meeting goal of <7.0    PLAN  See Patient Instructions for co-developed, patient-stated behavior change goals.  AVS printed and provided to patient today. See Follow-Up section for recommended follow-up.      Time Spent: 60 minutes  Encounter Type: Individual    Any diabetes medication dose changes were made via the CDE Protocol and Collaborative Practice Agreement with the patient's referring provider. A copy of this encounter was shared with the provider.    COURTNEY Flanagan [facility]  [phone]

## 2022-05-02 ENCOUNTER — MYC MEDICAL ADVICE (OUTPATIENT)
Dept: FAMILY MEDICINE | Facility: CLINIC | Age: 62
End: 2022-05-02
Payer: COMMERCIAL

## 2022-05-02 DIAGNOSIS — K21.00 GASTROESOPHAGEAL REFLUX DISEASE WITH ESOPHAGITIS, UNSPECIFIED WHETHER HEMORRHAGE: Primary | ICD-10-CM

## 2022-05-27 ENCOUNTER — VIRTUAL VISIT (OUTPATIENT)
Dept: EDUCATION SERVICES | Facility: OTHER | Age: 62
End: 2022-05-27
Payer: COMMERCIAL

## 2022-05-27 DIAGNOSIS — E11.65 TYPE 2 DIABETES MELLITUS WITH HYPERGLYCEMIA, WITHOUT LONG-TERM CURRENT USE OF INSULIN (H): Primary | ICD-10-CM

## 2022-05-27 PROCEDURE — G0108 DIAB MANAGE TRN  PER INDIV: HCPCS | Performed by: DIETITIAN, REGISTERED

## 2022-05-27 NOTE — PATIENT INSTRUCTIONS
1).  Stop insulin  2).  Keep on metformin xr as you have been taking  3).  Vary testing.  Before meals, 2 hours after start of meal are good times to check, occasional bed time

## 2022-05-27 NOTE — LETTER
5/27/2022         RE: Farhat Tejada  [address] [facility] [address]        Dear Colleague,    Thank you for referring your patient, Farhat Tejada, to the [facility]. Please see a copy of my visit note below.      Type of Service: Telephone Visit    Originating Location (Patient Location): Home  Distant Location (Provider Location): Home  Mode of Communication:  Telephone        How would patient like to obtain AVS? MyChart     Diabetes Self-Management Education & Support    Presents for: Follow-up    SUBJECTIVE/OBJECTIVE:  Presents for: Follow-up  Accompanied by: Self  Diabetes education in the past 24mo: Yes  Focus of Visit: Monitoring, Healthy Eating, Being Active, Taking Medication, Diabetes Pathophysiology  Diabetes type: Type 2  Date of diagnosis: 2022  Disease course: Improving  How confident are you filling out medical forms by yourself:: Not Assessed  Diabetes management related comments/concerns: Eventual goal to get off insulin  Difficulty affording diabetes medication?: No  Difficulty affording diabetes testing supplies?: No  Other concerns:: None  Cultural Influences/Ethnic Background:  Not  or     Diabetes Symptoms & Complications:  Fatigue: Yes  Neuropathy: Yes  Polydipsia: No  Polyphagia: No  Polyuria: No  Visual change: No (seeing Eye Doctor in June)  Slow healing wounds: No  Other: Yes (Went in when dx with east infection)  Symptom course: Improving  Weight trend: Stable  Complications assessed today?: No  Autonomic neuropathy: No  CVA: No  Heart disease: No  Nephropathy: No  Peripheral neuropathy: Yes  Peripheral Vascular Disease: No  Retinopathy: No  Sexual dysfunction: No    Patient Problem List and Family Medical History reviewed for relevant medical history, current medical status, and diabetes risk factors.    Vitals:  There were no vitals taken for this visit.  Estimated body mass index is 28.01 kg/m  as calculated from the  "following:    Height as of 3/30/22: 1.791 m (5' 10.5\").    Weight as of 3/30/22: 89.8 kg (198 lb).   Last 3 BP:   BP Readings from Last 3 Encounters:   03/30/22 113/80   03/24/22 (!) 152/83   03/21/22 (!) (P) 144/92       History   Smoking Status     Never Smoker   Smokeless Tobacco     Never Used       Labs:  Lab Results   Component Value Date    A1C 11.0 03/18/2022    A1C 6.0 08/23/2019     Lab Results   Component Value Date     03/30/2022     01/10/2020     Lab Results   Component Value Date     03/30/2022     01/10/2020     HDL Cholesterol   Date Value Ref Range Status   01/10/2020 54 >39 mg/dL Final     Direct Measure HDL   Date Value Ref Range Status   03/30/2022 38 (L) >=40 mg/dL Final   ]  GFR Estimate   Date Value Ref Range Status   03/30/2022 81 >60 mL/min/1.73m2 Final     Comment:     Effective December 21, 2021 eGFRcr in adults is calculated using the 2021 CKD-EPI creatinine equation which includes age and gender (Callie et al., NEJ, DOI: 10.1056/[id number])   01/10/2020 79 >60 mL/min/[1.73_m2] Final     Comment:     Non  GFR Calc  Starting 12/18/2018, serum creatinine based estimated GFR (eGFR) will be   calculated using the Chronic Kidney Disease Epidemiology Collaboration   (CKD-EPI) equation.       GFR Estimate If Black   Date Value Ref Range Status   01/10/2020 >90 >60 mL/min/[1.73_m2] Final     Comment:      GFR Calc  Starting 12/18/2018, serum creatinine based estimated GFR (eGFR) will be   calculated using the Chronic Kidney Disease Epidemiology Collaboration   (CKD-EPI) equation.       Lab Results   Component Value Date    CR 1.04 03/30/2022    CR 1.02 01/10/2020     No results found for: MICROALBUMIN    Healthy Eating:  Healthy Eating Assessed Today: Yes  Cultural/Mandaeism diet restrictions?: No  Meal planning/habits: Smaller portions, Keeps food records  Meals include: Breakfast, Lunch, Dinner  Breakfast: Bowl of cereal or oatmeal " (life cereal, cinnamon) plain oats (stevia), coffee (no sugar)  Lunch: [place] with corn chips or potato chips (small amounts), leftover pasta and roasted vegetables, water  Dinner: pasta and roasted vegetables, cheese burgers/green beans/salad, water  Snacks: PM snack apple HS sugar free ice cream  Other: Recently had Gall Bladder surgery and noticed difficulty with foods right after, now doing okay; has gone to sugar free stuff  Beverages: Alcohol, Sports drinks, Coffee, Water, Tea (Red wine (dry), sports drinks sugar free, almond milk unsweetened, unsweetened tea)  Has patient met with a dietitian in the past?: Yes    Being Active:  Being Active Assessed Today: Yes  Exercise:: Yes  Days per week of moderate to strenuous exercise (like a brisk walk): 4  On average, minutes per day of exercise at this level: 30  How intense was your typical exercise? : Moderate (like brisk walking)  Exercise Minutes per Week: 120  Barrier to exercise: None    Monitoring:  Monitoring Assessed Today: Yes  Did patient bring glucose meter to appointment? : Yes  Blood Glucose Meter: ContourNext  Times checking blood sugar at home (number): 4  Times checking blood sugar at home (per): Day  Blood glucose trend: Decreasing    Date Breakfast  Lunch  Dinner  Bedtime    Before After Before After Before After    5/27 127         5/26 124  119 144 100  170   5/25 123  92  102  91   5/24 124  88  108  116   5/23 128  164  86  99   5/22 104  85  138  113   5/21 62  90  121  164       Taking Medications:  Diabetes Medication(s)     Biguanides       metFORMIN (GLUCOPHAGE-XR) 500 MG 24 hr tablet    Take 2 tablets (1,000 mg) by mouth 2 times daily (with meals)    Insulin       insulin glargine (LANTUS PEN) 100 UNIT/ML pen    Inject 8 Units Subcutaneous At Bedtime          Taking Medication Assessed Today: Yes  Current Treatments: Diet, Oral Medication (taken by mouth), Insulin Injections  Problems taking diabetes medications regularly?: No  Diabetes  medication side effects?: No    Problem Solving:  Problem Solving Assessed Today: Yes  Is the patient at risk for hypoglycemia?: Yes  Hypoglycemia Frequency: Rarely  Is the patient at risk for DKA?: No              Reducing Risks:  Reducing Risks Assessed Today: Yes  Diabetes Risks: Family History, Hyperlipidemia, Age over 45 years  CAD Risks: Diabetes Mellitus, Obesity, Male sex  Has dilated eye exam at least once a year?: Yes  Sees dentist every 6 months?: Yes  Feet checked by healthcare provider in the last year?: No    Healthy Coping:  Healthy Coping Assessed Today: Yes  Emotional response to diabetes: Acceptance, Ready to learn  Stage of change: ACTION (Actively working towards change)  Patient Activation Measure Survey Score:  No flowsheet data found.    Diabetes knowledge and skills assessment:   Patient is knowledgeable in diabetes management concepts related to: Being Active and Monitoring    Patient needs further education on the following diabetes management concepts: Healthy Eating    Based on learning assessment above, most appropriate setting for further diabetes education would be: Individual setting.      INTERVENTIONS:    Education provided today on:  AADE Self-Care Behaviors:  Diabetes Pathophysiology  Healthy Eating: carbohydrate counting, consistency in amount, composition, and timing of food intake and label reading  Monitoring: individual blood glucose targets and frequency of monitoring  Taking Medication: action of prescribed medication and when to take medications    Opportunities for ongoing education and support in diabetes-self management were discussed.    Pt verbalized understanding of concepts discussed and recommendations provided today.       Education Materials Provided:  No new materials provided today      ASSESSMENT:  Pt's blood sugars doing well.  Only on 6 units of lantus per day and metformin XR 2000mg daily (tolerating this).  Blood sugars usually in low to mid 100 range, one  time 62.  Will try off insulin as less than 0.1 units per kg.  Vary testing.  Plan to follow up after next A1C draw.         Patient's most recent   Lab Results   Component Value Date    A1C 11.0 03/18/2022    A1C 6.0 08/23/2019    is not meeting goal of <7.0    PLAN  See Patient Instructions for co-developed, patient-stated behavior change goals.  AVS printed and provided to patient today. See Follow-Up section for recommended follow-up.      Time Spent: 40 minutes  Encounter Type: Individual    Any diabetes medication dose changes were made via the CDE Protocol and Collaborative Practice Agreement with the patient's referring provider. A copy of this encounter was shared with the provider.      Rina Gupta RD [facility]  [phone]

## 2022-05-27 NOTE — PROGRESS NOTES
"  Type of Service: Telephone Visit    Originating Location (Patient Location): Home  Distant Location (Provider Location): Home  Mode of Communication:  Telephone        How would patient like to obtain AVS? Jamar     Diabetes Self-Management Education & Support    Presents for: Follow-up    SUBJECTIVE/OBJECTIVE:  Presents for: Follow-up  Accompanied by: Self  Diabetes education in the past 24mo: Yes  Focus of Visit: Monitoring, Healthy Eating, Being Active, Taking Medication, Diabetes Pathophysiology  Diabetes type: Type 2  Date of diagnosis: 2022  Disease course: Improving  How confident are you filling out medical forms by yourself:: Not Assessed  Diabetes management related comments/concerns: Eventual goal to get off insulin  Difficulty affording diabetes medication?: No  Difficulty affording diabetes testing supplies?: No  Other concerns:: None  Cultural Influences/Ethnic Background:  Not  or     Diabetes Symptoms & Complications:  Fatigue: Yes  Neuropathy: Yes  Polydipsia: No  Polyphagia: No  Polyuria: No  Visual change: No (seeing Eye Doctor in June)  Slow healing wounds: No  Other: Yes (Went in when dx with east infection)  Symptom course: Improving  Weight trend: Stable  Complications assessed today?: No  Autonomic neuropathy: No  CVA: No  Heart disease: No  Nephropathy: No  Peripheral neuropathy: Yes  Peripheral Vascular Disease: No  Retinopathy: No  Sexual dysfunction: No    Patient Problem List and Family Medical History reviewed for relevant medical history, current medical status, and diabetes risk factors.    Vitals:  There were no vitals taken for this visit.  Estimated body mass index is 28.01 kg/m  as calculated from the following:    Height as of 3/30/22: 1.791 m (5' 10.5\").    Weight as of 3/30/22: 89.8 kg (198 lb).   Last 3 BP:   BP Readings from Last 3 Encounters:   03/30/22 113/80   03/24/22 (!) 152/83   03/21/22 (!) (P) 144/92       History   Smoking Status     Never Smoker "   Smokeless Tobacco     Never Used       Labs:  Lab Results   Component Value Date    A1C 11.0 03/18/2022    A1C 6.0 08/23/2019     Lab Results   Component Value Date     03/30/2022     01/10/2020     Lab Results   Component Value Date     03/30/2022     01/10/2020     HDL Cholesterol   Date Value Ref Range Status   01/10/2020 54 >39 mg/dL Final     Direct Measure HDL   Date Value Ref Range Status   03/30/2022 38 (L) >=40 mg/dL Final   ]  GFR Estimate   Date Value Ref Range Status   03/30/2022 81 >60 mL/min/1.73m2 Final     Comment:     Effective December 21, 2021 eGFRcr in adults is calculated using the 2021 CKD-EPI creatinine equation which includes age and gender (Callie et al., NEJ, DOI: 10.1056/VEMCfv4833766)   01/10/2020 79 >60 mL/min/[1.73_m2] Final     Comment:     Non  GFR Calc  Starting 12/18/2018, serum creatinine based estimated GFR (eGFR) will be   calculated using the Chronic Kidney Disease Epidemiology Collaboration   (CKD-EPI) equation.       GFR Estimate If Black   Date Value Ref Range Status   01/10/2020 >90 >60 mL/min/[1.73_m2] Final     Comment:      GFR Calc  Starting 12/18/2018, serum creatinine based estimated GFR (eGFR) will be   calculated using the Chronic Kidney Disease Epidemiology Collaboration   (CKD-EPI) equation.       Lab Results   Component Value Date    CR 1.04 03/30/2022    CR 1.02 01/10/2020     No results found for: MICROALBUMIN    Healthy Eating:  Healthy Eating Assessed Today: Yes  Cultural/Episcopal diet restrictions?: No  Meal planning/habits: Smaller portions, Keeps food records  Meals include: Breakfast, Lunch, Dinner  Breakfast: Bowl of cereal or oatmeal (life cereal, cinnamon) plain oats (stevia), coffee (no sugar)  Lunch: [place] with corn chips or potato chips (small amounts), leftover pasta and roasted vegetables, water  Dinner: pasta and roasted vegetables, cheese burgers/green beans/salad, water  Snacks:  PM snack apple HS sugar free ice cream  Other: Recently had Gall Bladder surgery and noticed difficulty with foods right after, now doing okay; has gone to sugar free stuff  Beverages: Alcohol, Sports drinks, Coffee, Water, Tea (Red wine (dry), sports drinks sugar free, almond milk unsweetened, unsweetened tea)  Has patient met with a dietitian in the past?: Yes    Being Active:  Being Active Assessed Today: Yes  Exercise:: Yes  Days per week of moderate to strenuous exercise (like a brisk walk): 4  On average, minutes per day of exercise at this level: 30  How intense was your typical exercise? : Moderate (like brisk walking)  Exercise Minutes per Week: 120  Barrier to exercise: None    Monitoring:  Monitoring Assessed Today: Yes  Did patient bring glucose meter to appointment? : Yes  Blood Glucose Meter: ContourNext  Times checking blood sugar at home (number): 4  Times checking blood sugar at home (per): Day  Blood glucose trend: Decreasing    Date Breakfast  Lunch  Dinner  Bedtime    Before After Before After Before After    5/27 127         5/26 124  119 144 100  170   5/25 123  92  102  91   5/24 124  88  108  116   5/23 128  164  86  99   5/22 104  85  138  113   5/21 62  90  121  164       Taking Medications:  Diabetes Medication(s)     Biguanides       metFORMIN (GLUCOPHAGE-XR) 500 MG 24 hr tablet    Take 2 tablets (1,000 mg) by mouth 2 times daily (with meals)    Insulin       insulin glargine (LANTUS PEN) 100 UNIT/ML pen    Inject 8 Units Subcutaneous At Bedtime          Taking Medication Assessed Today: Yes  Current Treatments: Diet, Oral Medication (taken by mouth), Insulin Injections  Problems taking diabetes medications regularly?: No  Diabetes medication side effects?: No    Problem Solving:  Problem Solving Assessed Today: Yes  Is the patient at risk for hypoglycemia?: Yes  Hypoglycemia Frequency: Rarely  Is the patient at risk for DKA?: No              Reducing Risks:  Reducing Risks Assessed  Today: Yes  Diabetes Risks: Family History, Hyperlipidemia, Age over 45 years  CAD Risks: Diabetes Mellitus, Obesity, Male sex  Has dilated eye exam at least once a year?: Yes  Sees dentist every 6 months?: Yes  Feet checked by healthcare provider in the last year?: No    Healthy Coping:  Healthy Coping Assessed Today: Yes  Emotional response to diabetes: Acceptance, Ready to learn  Stage of change: ACTION (Actively working towards change)  Patient Activation Measure Survey Score:  No flowsheet data found.    Diabetes knowledge and skills assessment:   Patient is knowledgeable in diabetes management concepts related to: Being Active and Monitoring    Patient needs further education on the following diabetes management concepts: Healthy Eating    Based on learning assessment above, most appropriate setting for further diabetes education would be: Individual setting.      INTERVENTIONS:    Education provided today on:  AADE Self-Care Behaviors:  Diabetes Pathophysiology  Healthy Eating: carbohydrate counting, consistency in amount, composition, and timing of food intake and label reading  Monitoring: individual blood glucose targets and frequency of monitoring  Taking Medication: action of prescribed medication and when to take medications    Opportunities for ongoing education and support in diabetes-self management were discussed.    Pt verbalized understanding of concepts discussed and recommendations provided today.       Education Materials Provided:  No new materials provided today      ASSESSMENT:  Pt's blood sugars doing well.  Only on 6 units of lantus per day and metformin XR 2000mg daily (tolerating this).  Blood sugars usually in low to mid 100 range, one time 62.  Will try off insulin as less than 0.1 units per kg.  Vary testing.  Plan to follow up after next A1C draw.         Patient's most recent   Lab Results   Component Value Date    A1C 11.0 03/18/2022    A1C 6.0 08/23/2019    is not meeting goal of  <7.0    PLAN  See Patient Instructions for co-developed, patient-stated behavior change goals.  AVS printed and provided to patient today. See Follow-Up section for recommended follow-up.      Time Spent: 40 minutes  Encounter Type: Individual    Any diabetes medication dose changes were made via the CDE Protocol and Collaborative Practice Agreement with the patient's referring provider. A copy of this encounter was shared with the provider.      Rina Gupta RD [facility]  [phone]

## 2022-05-27 NOTE — Clinical Note
Asked pt to stop insulin as only on 6 units daily, less than 0.1 units per kg.  Blood sugars controlled on max metformin xr.  Has follow up with you scheduled and me later in July.  Thanks!

## 2022-06-08 ENCOUNTER — OFFICE VISIT (OUTPATIENT)
Dept: FAMILY MEDICINE | Facility: CLINIC | Age: 62
End: 2022-06-08
Payer: COMMERCIAL

## 2022-06-08 VITALS
HEART RATE: 84 BPM | WEIGHT: 199 LBS | RESPIRATION RATE: 18 BRPM | DIASTOLIC BLOOD PRESSURE: 79 MMHG | BODY MASS INDEX: 28.15 KG/M2 | SYSTOLIC BLOOD PRESSURE: 119 MMHG | TEMPERATURE: 98.7 F | OXYGEN SATURATION: 98 %

## 2022-06-08 DIAGNOSIS — E11.65 TYPE 2 DIABETES MELLITUS WITH HYPERGLYCEMIA, WITHOUT LONG-TERM CURRENT USE OF INSULIN (H): Primary | ICD-10-CM

## 2022-06-08 DIAGNOSIS — E11.42 DIABETIC POLYNEUROPATHY ASSOCIATED WITH TYPE 2 DIABETES MELLITUS (H): ICD-10-CM

## 2022-06-08 DIAGNOSIS — Z28.21 COVID-19 VACCINE DOSE DECLINED: ICD-10-CM

## 2022-06-08 DIAGNOSIS — R00.0 TACHYCARDIA: ICD-10-CM

## 2022-06-08 DIAGNOSIS — G47.33 MILD OBSTRUCTIVE SLEEP APNEA: ICD-10-CM

## 2022-06-08 LAB
ALBUMIN SERPL-MCNC: 4 G/DL (ref 3.4–5)
ALP SERPL-CCNC: 75 U/L (ref 40–150)
ALT SERPL W P-5'-P-CCNC: 33 U/L (ref 0–70)
ANION GAP SERPL CALCULATED.3IONS-SCNC: 7 MMOL/L (ref 3–14)
AST SERPL W P-5'-P-CCNC: 24 U/L (ref 0–45)
BILIRUB SERPL-MCNC: 1.5 MG/DL (ref 0.2–1.3)
BUN SERPL-MCNC: 17 MG/DL (ref 7–30)
CALCIUM SERPL-MCNC: 9.7 MG/DL (ref 8.5–10.1)
CHLORIDE BLD-SCNC: 104 MMOL/L (ref 94–109)
CO2 SERPL-SCNC: 26 MMOL/L (ref 20–32)
CREAT SERPL-MCNC: 1.06 MG/DL (ref 0.66–1.25)
GFR SERPL CREATININE-BSD FRML MDRD: 79 ML/MIN/1.73M2
GLUCOSE BLD-MCNC: 193 MG/DL (ref 70–99)
HBA1C MFR BLD: 6.6 % (ref 0–5.6)
POTASSIUM BLD-SCNC: 4.6 MMOL/L (ref 3.4–5.3)
PROT SERPL-MCNC: 7.5 G/DL (ref 6.8–8.8)
SODIUM SERPL-SCNC: 137 MMOL/L (ref 133–144)

## 2022-06-08 PROCEDURE — 82043 UR ALBUMIN QUANTITATIVE: CPT | Performed by: PHYSICIAN ASSISTANT

## 2022-06-08 PROCEDURE — 80053 COMPREHEN METABOLIC PANEL: CPT | Performed by: PHYSICIAN ASSISTANT

## 2022-06-08 PROCEDURE — 99215 OFFICE O/P EST HI 40 MIN: CPT | Performed by: PHYSICIAN ASSISTANT

## 2022-06-08 PROCEDURE — 36415 COLL VENOUS BLD VENIPUNCTURE: CPT | Performed by: PHYSICIAN ASSISTANT

## 2022-06-08 PROCEDURE — 83036 HEMOGLOBIN GLYCOSYLATED A1C: CPT | Performed by: PHYSICIAN ASSISTANT

## 2022-06-08 RX ORDER — LISINOPRIL 2.5 MG/1
2.5 TABLET ORAL DAILY
Qty: 90 TABLET | Refills: 1 | Status: SHIPPED | OUTPATIENT
Start: 2022-06-08 | End: 2022-07-06

## 2022-06-08 RX ORDER — METFORMIN HCL 500 MG
1000 TABLET, EXTENDED RELEASE 24 HR ORAL 2 TIMES DAILY WITH MEALS
Qty: 360 TABLET | Refills: 1 | Status: SHIPPED | OUTPATIENT
Start: 2022-06-08 | End: 2022-07-06

## 2022-06-08 RX ORDER — ATORVASTATIN CALCIUM 40 MG/1
40 TABLET, FILM COATED ORAL DAILY
Qty: 90 TABLET | Refills: 1 | Status: SHIPPED | OUTPATIENT
Start: 2022-06-08 | End: 2022-07-06

## 2022-06-08 NOTE — PATIENT INSTRUCTIONS
Ok to try melatonin before bed.  Also try to limit screen time before bed. Talk with dentist about a oral appliance.     Use coricidin HBP for cold symptoms when needed.  Avoid decongestants as these can raise your blood pressure or heart rate.   And monitor for irregular heart beats, go into ER if those occur.  Consider an apple watch with EKG monitoring if this persists (mildly).       May consider gabapentin down the road if the foot irritation persists.

## 2022-06-08 NOTE — PROGRESS NOTES
Assessment & Plan     Type 2 diabetes mellitus with hyperglycemia, without long-term current use of insulin (H)  A1C has come down quite a bit over the span of 2 months.  He stopped lantus a couple weeks ago, on full dose of metformin.    I suggest leaving the metformin as is.  We'll recheck with non-fasting lab only visit in 3 months (once he is fully off the insulin) and ensure levels are steady then.  If not, would next consider adding in a GLP1 agonist.     Has diabetic eye exam scheduled soon.     - Albumin Random Urine Quantitative with Creat Ratio  - Comprehensive metabolic panel  - Hemoglobin A1c  - **A1C FUTURE 3mo; Future    Diabetic polyneuropathy associated with type 2 diabetes mellitus (H)  No signs of a fungal infection on exam today. LIkely due to the high sugars. I expect the nerve irritation to calm down some with more time in a normal glucose range.  If not, would next consider adding some gabapentin at bedtime.  Will wait 2-3 months.     Mild obstructive sleep apnea    Ok to try melatonin before bed.  Also try to limit screen time before bed. Talk with dentist about a oral appliance.   Tachycardia  Use coricidin HBP for cold symptoms when needed.  Avoid decongestants as these can raise your blood pressure or heart rate.   And monitor for irregular heart beats, go into ER if those occur.  Consider an apple watch with EKG monitoring if this persists (mildly).     Covid vaccine dose declined.  Discussed in detail booster guidelines and current state of pandemic.  Numbers are high in the community currently, therefore I do suggest a booster dose.  He will consider.     43 minutes spent on the date of the encounter doing chart review, history and exam, documentation and further activities per the note           Return in about 3 months (around 9/8/2022) for Lab Work (A1C spot check).    Helena Magaña PA-C  [facility] [place] is a 61 year old who presents for  the following health issues  accompanied by his spouse.    Patient with history of diabetes, hypertension and hyperlipidemia presents for medication follow-up.     History of Present Illness       Diabetes:   He presents for follow up of diabetes.  He is checking home blood glucose two times daily. He checks blood glucose before and after meals.  Blood glucose is sometimes over 200 and sometimes under 70. When his blood glucose is low, the patient is asymptomatic for confusion, blurred vision, lethargy and reports not feeling dizzy, shaky, or weak.  He has no concerns regarding his diabetes at this time.  He is having numbness in feet and burning in feet. The patient has not had a diabetic eye exam in the last 12 months.         He eats 2-3 servings of fruits and vegetables daily.He consumes 0 sweetened beverage(s) daily.He exercises with enough effort to increase his heart rate 30 to 60 minutes per day.  He exercises with enough effort to increase his heart rate 3 or less days per week.   He is taking medications regularly.       Hyperlipidemia Follow-Up      Are you regularly taking any medication or supplement to lower your cholesterol?   Yes- Atorvastatin    Are you having muscle aches or other side effects that you think could be caused by your cholesterol lowering medication?  No    Hypertension Follow-up      Do you check your blood pressure regularly outside of the clinic? No     Are you following a low salt diet? No    Are your blood pressures ever more than 140 on the top number (systolic) OR more   than 90 on the bottom number (diastolic), for example 140/90? No    Has issues with sleeping at times.  Used to snore a lot, this has improved. Had a sleep study that showed mild ALEXI    Left> right foot with a burning sensation (especially at night).  Used a mask on the feet a few months ago and then a few weeks later had a rash/dry flaking skin.  No rash currently.  He has had athletes foot in the past and  states it does not seem similar to that currently.  Tingling and burning is mainly over left great toe.      He was sick last week and noticed an elevated HR (120-130).  He took some sudafed and also noticed an elevated HR . Currently feels fine.  No palpitations.     Review of Systems   Constitutional, HEENT, cardiovascular, pulmonary, gi and gu systems are negative, except as otherwise noted.      Objective    /79 (BP Location: Right arm, Patient Position: Chair, Cuff Size: Adult Large)   Pulse 84   Temp 98.7  F (37.1  C) (Tympanic)   Resp 18   Wt 90.3 kg (199 lb)   SpO2 98%   BMI 28.15 kg/m    Body mass index is 28.15 kg/m .  Physical Exam   GENERAL: healthy, alert and no distress  NECK: no adenopathy, no asymmetry, masses, or scars and thyroid normal to palpation  RESP: lungs clear to auscultation - no rales, rhonchi or wheezes  CV: regular rate and rhythm, normal S1 S2, no S3 or S4, no murmur, click or rub, no peripheral edema and peripheral pulses strong  ABDOMEN: soft, nontender, no hepatosplenomegaly, no masses and bowel sounds normal  MS: no gross musculoskeletal defects noted, no edema  Diabetic foot exam: normal DP and PT pulses, no trophic changes or ulcerative lesions and normal sensory exam    Results for orders placed or performed in visit on 06/08/22 (from the past 24 hour(s))   Hemoglobin A1c   Result Value Ref Range    Hemoglobin A1C 6.6 (H) 0.0 - 5.6 %

## 2022-06-09 LAB
CREAT UR-MCNC: 215 MG/DL
MICROALBUMIN UR-MCNC: 16 MG/L
MICROALBUMIN/CREAT UR: 7.44 MG/G CR (ref 0–17)

## 2022-06-14 ENCOUNTER — TRANSFERRED RECORDS (OUTPATIENT)
Dept: MULTI SPECIALTY CLINIC | Facility: CLINIC | Age: 62
End: 2022-06-14

## 2022-06-14 LAB — RETINOPATHY: NORMAL

## 2022-06-23 DIAGNOSIS — E11.65 TYPE 2 DIABETES MELLITUS WITH HYPERGLYCEMIA, WITHOUT LONG-TERM CURRENT USE OF INSULIN (H): ICD-10-CM

## 2022-06-24 RX ORDER — LISINOPRIL 2.5 MG/1
2.5 TABLET ORAL DAILY
Qty: 90 TABLET | Refills: 1 | OUTPATIENT
Start: 2022-06-24

## 2022-06-24 RX ORDER — METFORMIN HCL 500 MG
1000 TABLET, EXTENDED RELEASE 24 HR ORAL 2 TIMES DAILY WITH MEALS
Qty: 360 TABLET | Refills: 1 | OUTPATIENT
Start: 2022-06-24

## 2022-06-24 RX ORDER — ATORVASTATIN CALCIUM 40 MG/1
40 TABLET, FILM COATED ORAL DAILY
Qty: 90 TABLET | Refills: 1 | OUTPATIENT
Start: 2022-06-24

## 2022-07-06 ENCOUNTER — MYC MEDICAL ADVICE (OUTPATIENT)
Dept: FAMILY MEDICINE | Facility: CLINIC | Age: 62
End: 2022-07-06

## 2022-07-06 DIAGNOSIS — E11.65 TYPE 2 DIABETES MELLITUS WITH HYPERGLYCEMIA, WITHOUT LONG-TERM CURRENT USE OF INSULIN (H): ICD-10-CM

## 2022-07-06 RX ORDER — ATORVASTATIN CALCIUM 40 MG/1
40 TABLET, FILM COATED ORAL DAILY
Qty: 90 TABLET | Refills: 1 | Status: SHIPPED | OUTPATIENT
Start: 2022-07-06 | End: 2022-11-09

## 2022-07-06 RX ORDER — METFORMIN HCL 500 MG
1000 TABLET, EXTENDED RELEASE 24 HR ORAL 2 TIMES DAILY WITH MEALS
Qty: 360 TABLET | Refills: 1 | Status: SHIPPED | OUTPATIENT
Start: 2022-07-06 | End: 2022-11-09

## 2022-07-06 RX ORDER — LISINOPRIL 2.5 MG/1
2.5 TABLET ORAL DAILY
Qty: 90 TABLET | Refills: 1 | Status: SHIPPED | OUTPATIENT
Start: 2022-07-06 | End: 2022-11-09

## 2022-07-07 ENCOUNTER — TELEPHONE (OUTPATIENT)
Dept: FAMILY MEDICINE | Facility: CLINIC | Age: 62
End: 2022-07-07

## 2022-07-07 DIAGNOSIS — E11.65 TYPE 2 DIABETES MELLITUS WITH HYPERGLYCEMIA, WITHOUT LONG-TERM CURRENT USE OF INSULIN (H): ICD-10-CM

## 2022-07-07 NOTE — TELEPHONE ENCOUNTER
Reviewed med list and do not see that patient is on any injectable insulin so unsure why request for pen needles.  Spoke with pharmacist at [id number] - [place], [place] - [address] [facility] and per him patient is on Semglee.  Nurse found documentation in virtual encounter with dietitian on 5-27-22-ASSESSMENT:  Pt's blood sugars doing well.  Only on 6 units of lantus per day and metformin XR 2000mg daily (tolerating this).  Blood sugars usually in low to mid 100 range, one time 62.  Will try off insulin as less than 0.1 units per kg.  Vary testing.  Plan to follow up after next A1C draw.     Looks like trial off insulin.  Needles pended for provider to advise.  Perhaps may need in future

## 2022-07-08 NOTE — TELEPHONE ENCOUNTER
Pharmacy likely has it on auto-refill.  Please call them to cancel script for needles, he is no longer on insulin.     Helena Magaña PA-C

## 2022-07-15 ENCOUNTER — VIRTUAL VISIT (OUTPATIENT)
Dept: EDUCATION SERVICES | Facility: OTHER | Age: 62
End: 2022-07-15
Payer: COMMERCIAL

## 2022-07-15 DIAGNOSIS — E11.65 TYPE 2 DIABETES MELLITUS WITH HYPERGLYCEMIA, WITHOUT LONG-TERM CURRENT USE OF INSULIN (H): Primary | ICD-10-CM

## 2022-07-15 PROCEDURE — G0108 DIAB MANAGE TRN  PER INDIV: HCPCS | Performed by: DIETITIAN, REGISTERED

## 2022-07-15 NOTE — LETTER
7/15/2022         RE: Faraht Tejada  [address] [facility] [id number]        Dear Colleague,    Thank you for referring your patient, Farhat Tejada, to the [facility]. Please see a copy of my visit note below.    Type of Service: Telephone Visit    Originating Location (Patient Location): Home  Distant Location (Provider Location): [facility]  Mode of Communication:  Telephone    How would patient like to obtain AVS? MyChart     Diabetes Self-Management Education & Support    Presents for: Follow-up    SUBJECTIVE/OBJECTIVE:  Presents for: Follow-up  Accompanied by: Self  Diabetes education in the past 24mo: Yes  Focus of Visit: Monitoring, Healthy Eating, Being Active, Taking Medication, Diabetes Pathophysiology  Diabetes type: Type 2  Date of diagnosis: 2022  Disease course: Improving  How confident are you filling out medical forms by yourself:: Not Assessed  Diabetes management related comments/concerns: Says had a low blood pressure episode recently  Difficulty affording diabetes medication?: No  Difficulty affording diabetes testing supplies?: No  Other concerns:: None  Cultural Influences/Ethnic Background:  Not  or       Diabetes Symptoms & Complications:  Fatigue: Sometimes  Neuropathy: Yes  Polydipsia: No  Polyphagia: No  Polyuria: No  Visual change: No (seeing Eye Doctor in June)  Slow healing wounds: No  Other: Yes (Went in when dx with east infection)  Symptom course: Improving  Weight trend: Stable  Complications assessed today?: No  Autonomic neuropathy: No  CVA: No  Heart disease: No  Nephropathy: No  Peripheral neuropathy: Yes  Peripheral Vascular Disease: No  Retinopathy: No  Sexual dysfunction: No    Patient Problem List and Family Medical History reviewed for relevant medical history, current medical status, and diabetes risk factors.    Vitals:  There were no vitals taken for this visit.  Estimated body mass index  "is 28.15 kg/m  as calculated from the following:    Height as of 3/30/22: 1.791 m (5' 10.5\").    Weight as of 6/8/22: 90.3 kg (199 lb).   Last 3 BP:   BP Readings from Last 3 Encounters:   06/08/22 119/79   03/30/22 113/80   03/24/22 (!) 152/83       History   Smoking Status     Never Smoker   Smokeless Tobacco     Never Used       Labs:  Lab Results   Component Value Date    A1C 6.6 06/08/2022    A1C 6.0 08/23/2019     Lab Results   Component Value Date     06/08/2022     01/10/2020     Lab Results   Component Value Date     03/30/2022     01/10/2020     HDL Cholesterol   Date Value Ref Range Status   01/10/2020 54 >39 mg/dL Final     Direct Measure HDL   Date Value Ref Range Status   03/30/2022 38 (L) >=40 mg/dL Final   ]  GFR Estimate   Date Value Ref Range Status   06/08/2022 79 >60 mL/min/1.73m2 Final     Comment:     Effective December 21, 2021 eGFRcr in adults is calculated using the 2021 CKD-EPI creatinine equation which includes age and gender (Callie et al., NEJM, DOI: 10.1056/NIYPwf4304557)   01/10/2020 79 >60 mL/min/[1.73_m2] Final     Comment:     Non  GFR Calc  Starting 12/18/2018, serum creatinine based estimated GFR (eGFR) will be   calculated using the Chronic Kidney Disease Epidemiology Collaboration   (CKD-EPI) equation.       GFR Estimate If Black   Date Value Ref Range Status   01/10/2020 >90 >60 mL/min/[1.73_m2] Final     Comment:      GFR Calc  Starting 12/18/2018, serum creatinine based estimated GFR (eGFR) will be   calculated using the Chronic Kidney Disease Epidemiology Collaboration   (CKD-EPI) equation.       Lab Results   Component Value Date    CR 1.06 06/08/2022    CR 1.02 01/10/2020     No results found for: MICROALBUMIN    Healthy Eating:  Healthy Eating Assessed Today: Yes  Cultural/Holiness diet restrictions?: No  Meal planning/habits: Smaller portions, Keeps food records  Meals include: Breakfast, Lunch, " Dinner  Breakfast: power bar (low carb one); cereal maybe once per week; breakfast burrito; oatmeal  Lunch: [place], chips, and piece of fruit  Dinner: Meat, chicken with vegetable, less pasta these days  Snacks: PM will have an apple  Other: Recently had Gall Bladder surgery and noticed difficulty with foods right after, now doing okay; has gone to sugar free stuff  Beverages: Alcohol, Sports drinks, Coffee, Water, Tea (Red wine (dry), sports drinks sugar free, almond milk unsweetened, unsweetened tea)  Has patient met with a dietitian in the past?: Yes    Being Active:  Being Active Assessed Today: Yes  Exercise:: Yes (has been walking less recently due to company in town)  Days per week of moderate to strenuous exercise (like a brisk walk): 4  On average, minutes per day of exercise at this level: 30  How intense was your typical exercise? : Moderate (like brisk walking)  Exercise Minutes per Week: 120  Barrier to exercise: None    Monitoring:  Monitoring Assessed Today: Yes  Did patient bring glucose meter to appointment? : Yes  Blood Glucose Meter: ContourNext  Times checking blood sugar at home (number): 4  Times checking blood sugar at home (per): Day  Blood glucose trend: Decreasing    Date Breakfast  Lunch  Dinner  Bedtime    Before After Before After Before After    7/15          7/14          7/13 130         7/12 7/11    154      7/10  171     185   7/9 7/8   135      127         Taking Medications:  Diabetes Medication(s)     Biguanides       metFORMIN (GLUCOPHAGE XR) 500 MG 24 hr tablet    Take 2 tablets (1,000 mg) by mouth 2 times daily (with meals)          Taking Medication Assessed Today: Yes  Current Treatments: Diet, Oral Medication (taken by mouth), Insulin Injections  Problems taking diabetes medications regularly?: No  Diabetes medication side effects?: No    Problem Solving:  Problem Solving Assessed Today: Yes  Is the patient at risk for hypoglycemia?: Yes  Hypoglycemia Frequency:  Never  Is the patient at risk for DKA?: No              Reducing Risks:  Reducing Risks Assessed Today: Yes  Diabetes Risks: Family History, Hyperlipidemia, Age over 45 years  CAD Risks: Diabetes Mellitus, Obesity, Male sex  Has dilated eye exam at least once a year?: Yes  Sees dentist every 6 months?: Yes  Feet checked by healthcare provider in the last year?: No    Healthy Coping:  Healthy Coping Assessed Today: Yes  Emotional response to diabetes: Acceptance, Ready to learn  Stage of change: ACTION (Actively working towards change)  Patient Activation Measure Survey Score:  No flowsheet data found.    Diabetes knowledge and skills assessment:   Patient is knowledgeable in diabetes management concepts related to: Monitoring and Taking Medication    Patient needs further education on the following diabetes management concepts: Healthy Eating    Based on learning assessment above, most appropriate setting for further diabetes education would be: Individual setting.      INTERVENTIONS:    Education provided today on:  AADE Self-Care Behaviors:  Diabetes Pathophysiology  Healthy Eating: carbohydrate counting, consistency in amount, composition, and timing of food intake, weight reduction, heart healthy diet and label reading  Being Active: relationship to blood glucose and describe appropriate activity program  Monitoring: log and interpret results, individual blood glucose targets and frequency of monitoring  Taking Medication: action of prescribed medication  Reducing Risks: major complications of diabetes, foot care, appropriate dental care, annual eye exam and A1C - goals, relating to blood glucose levels, how often to check    Opportunities for ongoing education and support in diabetes-self management were discussed.    Pt verbalized understanding of concepts discussed and recommendations provided today.       Education Materials Provided:  [employer] Healthy Living with Diabetes Book, Safe Disposal  Options for Needles & Syringes, BG Log Sheet, Carbohydrate Counting and My Plate Planner      ASSESSMENT:  Pt is doing well.  A1C down to 6.6.  Remains off insulin and only on metformin xr.  Pt had a recently issue with low blood pressure and pt is wondering if related to metformin or lisinopril.  Discussed likely the lisinopril and that day pt says he was dehydrated.  Pt is considering getting blood pressure monitor for home - recommended this would be a good idea.  Also suggested he reach out to PCP regarding what follow up he should have with this.  We did also discuss GLP-1 medications and potential this has to add heart protection and help with wt loss.  Pt says PCP did bring this up and will consider in future.  New dx education is complete at this time.  Encouraged following up annually or if to start GLP-1 make follow up with Diabetes Education as we can help titrate.        Patient's most recent   Lab Results   Component Value Date    A1C 6.6 06/08/2022    A1C 6.0 08/23/2019    is meeting goal of <7.0    PLAN  See Patient Instructions for co-developed, patient-stated behavior change goals.  AVS printed and provided to patient today. See Follow-Up section for recommended follow-up.      Time Spent: 30 minutes  Encounter Type: Individual    Any diabetes medication dose changes were made via the CDE Protocol and Collaborative Practice Agreement with the patient's referring provider. A copy of this encounter was shared with the provider.    Rina Gupta RD [facility]  [phone]

## 2022-07-15 NOTE — PATIENT INSTRUCTIONS
1).  30-60 grams of carbohydrate per meal; 15-25 grams of carb per snack  2).  Test blood sugar daily, vary testing times.  Goal is when you wake up or before another meal, goal is .  2 hours after the start of a meal, goal is less than 180.  3).  Reach out to [place] regarding follow up with your blood pressure  4).  Please follow up with Diabetes Education annually or before then if you have other questions/concerns  
4

## 2022-07-15 NOTE — PROGRESS NOTES
"Type of Service: Telephone Visit    Originating Location (Patient Location): Home  Distant Location (Provider Location): [facility]  Mode of Communication:  Telephone    How would patient like to obtain AVS? Jamar     Diabetes Self-Management Education & Support    Presents for: Follow-up    SUBJECTIVE/OBJECTIVE:  Presents for: Follow-up  Accompanied by: Self  Diabetes education in the past 24mo: Yes  Focus of Visit: Monitoring, Healthy Eating, Being Active, Taking Medication, Diabetes Pathophysiology  Diabetes type: Type 2  Date of diagnosis: 2022  Disease course: Improving  How confident are you filling out medical forms by yourself:: Not Assessed  Diabetes management related comments/concerns: Says had a low blood pressure episode recently  Difficulty affording diabetes medication?: No  Difficulty affording diabetes testing supplies?: No  Other concerns:: None  Cultural Influences/Ethnic Background:  Not  or       Diabetes Symptoms & Complications:  Fatigue: Sometimes  Neuropathy: Yes  Polydipsia: No  Polyphagia: No  Polyuria: No  Visual change: No (seeing Eye Doctor in June)  Slow healing wounds: No  Other: Yes (Went in when dx with east infection)  Symptom course: Improving  Weight trend: Stable  Complications assessed today?: No  Autonomic neuropathy: No  CVA: No  Heart disease: No  Nephropathy: No  Peripheral neuropathy: Yes  Peripheral Vascular Disease: No  Retinopathy: No  Sexual dysfunction: No    Patient Problem List and Family Medical History reviewed for relevant medical history, current medical status, and diabetes risk factors.    Vitals:  There were no vitals taken for this visit.  Estimated body mass index is 28.15 kg/m  as calculated from the following:    Height as of 3/30/22: 1.791 m (5' 10.5\").    Weight as of 6/8/22: 90.3 kg (199 lb).   Last 3 BP:   BP Readings from Last 3 Encounters:   06/08/22 119/79   03/30/22 113/80   03/24/22 (!) 152/83       History "   Smoking Status     Never Smoker   Smokeless Tobacco     Never Used       Labs:  Lab Results   Component Value Date    A1C 6.6 06/08/2022    A1C 6.0 08/23/2019     Lab Results   Component Value Date     06/08/2022     01/10/2020     Lab Results   Component Value Date     03/30/2022     01/10/2020     HDL Cholesterol   Date Value Ref Range Status   01/10/2020 54 >39 mg/dL Final     Direct Measure HDL   Date Value Ref Range Status   03/30/2022 38 (L) >=40 mg/dL Final   ]  GFR Estimate   Date Value Ref Range Status   06/08/2022 79 >60 mL/min/1.73m2 Final     Comment:     Effective December 21, 2021 eGFRcr in adults is calculated using the 2021 CKD-EPI creatinine equation which includes age and gender (Callie et al., NEJM, DOI: 10.1056/OHKRsf9233980)   01/10/2020 79 >60 mL/min/[1.73_m2] Final     Comment:     Non  GFR Calc  Starting 12/18/2018, serum creatinine based estimated GFR (eGFR) will be   calculated using the Chronic Kidney Disease Epidemiology Collaboration   (CKD-EPI) equation.       GFR Estimate If Black   Date Value Ref Range Status   01/10/2020 >90 >60 mL/min/[1.73_m2] Final     Comment:      GFR Calc  Starting 12/18/2018, serum creatinine based estimated GFR (eGFR) will be   calculated using the Chronic Kidney Disease Epidemiology Collaboration   (CKD-EPI) equation.       Lab Results   Component Value Date    CR 1.06 06/08/2022    CR 1.02 01/10/2020     No results found for: MICROALBUMIN    Healthy Eating:  Healthy Eating Assessed Today: Yes  Cultural/Taoism diet restrictions?: No  Meal planning/habits: Smaller portions, Keeps food records  Meals include: Breakfast, Lunch, Dinner  Breakfast: power bar (low carb one); cereal maybe once per week; breakfast burrito; oatmeal  Lunch: [place], chips, and piece of fruit  Dinner: Meat, chicken with vegetable, less pasta these days  Snacks: PM will have an apple  Other: Recently had Gall Bladder  surgery and noticed difficulty with foods right after, now doing okay; has gone to sugar free stuff  Beverages: Alcohol, Sports drinks, Coffee, Water, Tea (Red wine (dry), sports drinks sugar free, almond milk unsweetened, unsweetened tea)  Has patient met with a dietitian in the past?: Yes    Being Active:  Being Active Assessed Today: Yes  Exercise:: Yes (has been walking less recently due to company in town)  Days per week of moderate to strenuous exercise (like a brisk walk): 4  On average, minutes per day of exercise at this level: 30  How intense was your typical exercise? : Moderate (like brisk walking)  Exercise Minutes per Week: 120  Barrier to exercise: None    Monitoring:  Monitoring Assessed Today: Yes  Did patient bring glucose meter to appointment? : Yes  Blood Glucose Meter: ContourNext  Times checking blood sugar at home (number): 4  Times checking blood sugar at home (per): Day  Blood glucose trend: Decreasing    Date Breakfast  Lunch  Dinner  Bedtime    Before After Before After Before After    7/15          7/14          7/13 130         7/12 7/11    154      7/10  171     185   7/9 7/8   135      127         Taking Medications:  Diabetes Medication(s)     Biguanides       metFORMIN (GLUCOPHAGE XR) 500 MG 24 hr tablet    Take 2 tablets (1,000 mg) by mouth 2 times daily (with meals)          Taking Medication Assessed Today: Yes  Current Treatments: Diet, Oral Medication (taken by mouth), Insulin Injections  Problems taking diabetes medications regularly?: No  Diabetes medication side effects?: No    Problem Solving:  Problem Solving Assessed Today: Yes  Is the patient at risk for hypoglycemia?: Yes  Hypoglycemia Frequency: Never  Is the patient at risk for DKA?: No              Reducing Risks:  Reducing Risks Assessed Today: Yes  Diabetes Risks: Family History, Hyperlipidemia, Age over 45 years  CAD Risks: Diabetes Mellitus, Obesity, Male sex  Has dilated eye exam at least once a year?:  Yes  Sees dentist every 6 months?: Yes  Feet checked by healthcare provider in the last year?: No    Healthy Coping:  Healthy Coping Assessed Today: Yes  Emotional response to diabetes: Acceptance, Ready to learn  Stage of change: ACTION (Actively working towards change)  Patient Activation Measure Survey Score:  No flowsheet data found.    Diabetes knowledge and skills assessment:   Patient is knowledgeable in diabetes management concepts related to: Monitoring and Taking Medication    Patient needs further education on the following diabetes management concepts: Healthy Eating    Based on learning assessment above, most appropriate setting for further diabetes education would be: Individual setting.      INTERVENTIONS:    Education provided today on:  AADE Self-Care Behaviors:  Diabetes Pathophysiology  Healthy Eating: carbohydrate counting, consistency in amount, composition, and timing of food intake, weight reduction, heart healthy diet and label reading  Being Active: relationship to blood glucose and describe appropriate activity program  Monitoring: log and interpret results, individual blood glucose targets and frequency of monitoring  Taking Medication: action of prescribed medication  Reducing Risks: major complications of diabetes, foot care, appropriate dental care, annual eye exam and A1C - goals, relating to blood glucose levels, how often to check    Opportunities for ongoing education and support in diabetes-self management were discussed.    Pt verbalized understanding of concepts discussed and recommendations provided today.       Education Materials Provided:  [employer] Healthy Living with Diabetes Book, Safe Disposal Options for Needles & Syringes, BG Log Sheet, Carbohydrate Counting and My Plate Planner      ASSESSMENT:  Pt is doing well.  A1C down to 6.6.  Remains off insulin and only on metformin xr.  Pt had a recently issue with low blood pressure and pt is wondering if related to  metformin or lisinopril.  Discussed likely the lisinopril and that day pt says he was dehydrated.  Pt is considering getting blood pressure monitor for home - recommended this would be a good idea.  Also suggested he reach out to PCP regarding what follow up he should have with this.  We did also discuss GLP-1 medications and potential this has to add heart protection and help with wt loss.  Pt says PCP did bring this up and will consider in future.  New dx education is complete at this time.  Encouraged following up annually or if to start GLP-1 make follow up with Diabetes Education as we can help titrate.        Patient's most recent   Lab Results   Component Value Date    A1C 6.6 06/08/2022    A1C 6.0 08/23/2019    is meeting goal of <7.0    PLAN  See Patient Instructions for co-developed, patient-stated behavior change goals.  AVS printed and provided to patient today. See Follow-Up section for recommended follow-up.      Time Spent: 30 minutes  Encounter Type: Individual    Any diabetes medication dose changes were made via the CDE Protocol and Collaborative Practice Agreement with the patient's referring provider. A copy of this encounter was shared with the provider.    COURTNEY Flanagan [facility]  [phone]

## 2022-07-15 NOTE — Clinical Note
Pt is going to reach out to you regarding any additional follow up he needs in relationship to his most recent episode of low blood pressure

## 2022-07-18 DIAGNOSIS — E11.65 TYPE 2 DIABETES MELLITUS WITH HYPERGLYCEMIA, WITHOUT LONG-TERM CURRENT USE OF INSULIN (H): ICD-10-CM

## 2022-07-19 NOTE — TELEPHONE ENCOUNTER
Looks like the needles have been discontinued.    See RD education notes below from 7/15/22:    Remains off insulin and only on metformin xr.    Routed to PCP to see if we should deny this request.

## 2022-08-22 DIAGNOSIS — K21.00 GASTROESOPHAGEAL REFLUX DISEASE WITH ESOPHAGITIS, UNSPECIFIED WHETHER HEMORRHAGE: ICD-10-CM

## 2022-08-22 NOTE — TELEPHONE ENCOUNTER
Prescription approved per [facility] Refill Protocol.    Manuela Johnson RN BSN  [facility]

## 2022-09-29 ENCOUNTER — MYC MEDICAL ADVICE (OUTPATIENT)
Dept: FAMILY MEDICINE | Facility: CLINIC | Age: 62
End: 2022-09-29

## 2022-09-29 NOTE — PROGRESS NOTES
Assessment & Plan   Problem List Items Addressed This Visit    None     Visit Diagnoses     Dizziness    -  Primary    Relevant Medications    meclizine (ANTIVERT) 25 MG tablet    Other Relevant Orders    EKG 12-lead complete w/read - Clinics (Completed)    MR Brain w/o Contrast    REMOVE IMPACTED CERUMEN (Completed)    Hemoglobin (Completed)    Comprehensive metabolic panel (BMP + Alb, Alk Phos, ALT, AST, Total. Bili, TP) (Completed)    TSH with free T4 reflex (Completed)    Physical Therapy Referral    Impacted cerumen of right ear             Impression is likely BBPV, but cerumen impaction could be exacerbating symptoms. Copious cerumen irrigated from right ear canal. EKG not concerning for ischemia or worrisome dysrhythmia. Hgb and comp panel reassuring aside from mildly elevated bilirubin similar to previous. Will recheck in 2 weeks.TSH also normal. Low suspicion for CVA given resolution of symptoms, however will obtain MRI brain at patient's request to rule this out, which is reasonable. Will treat with PT, meclizine, hydration and follow up in at his already schedule appointment with his primary or sooner prn.    Complete history and physical exam as below. AF with normal VS.    DDx and Dx discussed with and explained to the pt to their satisfaction.  All questions were answered at this time. Pt expressed understanding of and agreement with this dx, tx, and plan. No further workup warranted and standard medication warnings given. I have given the patient a list of pertinent indications for re-evaluation. Will go to the Emergency Department if symptoms worsen or new concerning symptoms arise. Patient left in no apparent distress.     Ordering of each unique test  Prescription drug management  44 minutes spent on the date of the encounter doing chart review, history and exam, documentation and further activities per the note     BMI:   Estimated body mass index is 28.83 kg/m  as calculated from the  "following:    Height as of 3/30/22: 1.791 m (5' 10.5\").    Weight as of this encounter: 92.4 kg (203 lb 12.8 oz).     See Patient Instructions    Return in about 6 weeks (around 11/9/2022) for your already scheduled appointment, a recheck as needed.    JUANJO Poe  [facility] ASHLEE Dougherty is a 62 year old accompanied by his spouse, presenting for the following health issues:  No chief complaint on file.      HPI     Dizziness  Onset/Duration: 9/28/22. Acute onset blurred vision and room spinning dizziness.   Description:   Do you feel faint: No  Does it feel like the surroundings (bed, room) are moving: No  Unsteady/off balance: No  Have you passed out or fallen: No  Intensity: moderate  Progression of Symptoms: improving  Accompanying Signs & Symptoms:  Heart palpitations or chest pain: No  Nausea, vomiting: YES- nausea  Weakness or lack of coordination in arms or legs: No  Vision or speech changes: Yes. vision couldn't focus  Numbness or tingling: No  Ringing in ears (Tinnitus): No  Hearing Loss: No  History:   Head trauma/concussion history: No  Previous similar symptoms: No  Recent bleeding history: No  Any new medications (BP?): No  Precipitating factors:   Worse with activity: No  Worse with head movement: YES  Alleviating factors:   Does staying in a fixed position give relief: no   Therapies tried and outcome: closing his eyes    No new numbness or tingling or weakness. Has had similar symptoms in the past, but not for this long. Was nauseous as well. No sob, chest pain, bloody/black stools,      Patient presented to clinic for Right ear wash. Right ear(s) were inspected with an otoscope and found to have cerumen in the ear canal(s). The patient has not been using OTC debrox for 0 days prior to today. The patient's ear(s) were washed out with a hydrogen peroxide/water mix. The patient did tolerate the procedure well.   Rosie Keller, [facility]      Review of Systems "   Constitutional, HEENT, cardiovascular, pulmonary, gi and gu systems are negative, except as otherwise noted.      Objective    There were no vitals taken for this visit.  There is no height or weight on file to calculate BMI.  Physical Exam  Vitals and nursing note reviewed.   Constitutional:       General: He is not in acute distress.     Appearance: He is not ill-appearing or diaphoretic.   HENT:      Head: Normocephalic and atraumatic.      Right Ear: Tympanic membrane, ear canal and external ear normal. There is impacted cerumen.      Left Ear: Tympanic membrane, ear canal and external ear normal. There is no impacted cerumen.      Mouth/Throat:      Mouth: Mucous membranes are moist.   Eyes:      General: No scleral icterus.     Conjunctiva/sclera: Conjunctivae normal.      Comments: Mild exophthalmos.   Cardiovascular:      Rate and Rhythm: Normal rate and regular rhythm.      Heart sounds: Normal heart sounds. No murmur heard.    No friction rub. No gallop.   Pulmonary:      Effort: Pulmonary effort is normal. No respiratory distress.      Breath sounds: Normal breath sounds. No stridor. No wheezing, rhonchi or rales.   Abdominal:      General: Bowel sounds are normal. There is no distension.      Palpations: Abdomen is soft. There is no mass.      Tenderness: There is no abdominal tenderness. There is no guarding or rebound.      Hernia: No hernia is present.   Skin:     General: Skin is warm and dry.   Neurological:      General: No focal deficit present.      Mental Status: He is alert. Mental status is at baseline.      Gait: Gait normal.      Comments: Negative finger-nose-finger, heel-to-shin and pronator drift.  Face symmetric.  Speech clear. CN 2-12 intact. Normal strength and sensation in face and upper/lower extremities bilaterally.   Psychiatric:         Mood and Affect: Mood normal.         Behavior: Behavior normal.          EKG - Reviewed and interpreted by me appears normal, NSR, normal axis,  normal intervals, no acute ST/T changes c/w ischemia, no LVH by voltage criteria, unchanged from previous tracings  Results for orders placed or performed in visit on 09/30/22   TSH with free T4 reflex     Status: Normal   Result Value Ref Range    TSH 1.83 0.40 - 4.00 mU/L   Comprehensive metabolic panel (BMP + Alb, Alk Phos, ALT, AST, Total. Bili, TP)     Status: Abnormal   Result Value Ref Range    Sodium 137 133 - 144 mmol/L    Potassium 4.0 3.4 - 5.3 mmol/L    Chloride 104 94 - 109 mmol/L    Carbon Dioxide (CO2) 28 20 - 32 mmol/L    Anion Gap 5 3 - 14 mmol/L    Urea Nitrogen 15 7 - 30 mg/dL    Creatinine 0.94 0.66 - 1.25 mg/dL    Calcium 9.8 8.5 - 10.1 mg/dL    Glucose 119 (H) 70 - 99 mg/dL    Alkaline Phosphatase 63 40 - 150 U/L    AST 22 0 - 45 U/L    ALT 34 0 - 70 U/L    Protein Total 7.7 6.8 - 8.8 g/dL    Albumin 4.3 3.4 - 5.0 g/dL    Bilirubin Total 2.0 (H) 0.2 - 1.3 mg/dL    GFR Estimate >90 >60 mL/min/1.73m2   Hemoglobin     Status: Abnormal   Result Value Ref Range    Hemoglobin 12.7 (L) 13.3 - 17.7 g/dL

## 2022-09-29 NOTE — TELEPHONE ENCOUNTER
See My chart message.  Called patient to triage, no answer, left message on voicemail to call [phone].  Also left message for patient to check my chart message requesting patient to call if able or give more information.  Samantha Beard RN

## 2022-09-29 NOTE — TELEPHONE ENCOUNTER
Patient returned call and stated he currently is not dizzy.  Patient stated last night he had a dizzy spell when he was sitting on the couch.  Per patient his symptoms felt like motion sickness, upset stomach.  Patient got up and went to lye down.  While lying down that is when he felt like the room was spinning.  Dizziness comes and goes but last night's spell lasted longer than in the past.  Patient did check his BP last night and it was 154/106 and BS was 108.  Patient stated no new changes with medications.  Patient stated he has been having dizziness on and off since 7/9/22 when he was evaluated in ED.  Scheduled patient to be evaluated tomorrow am since patient is currently asymptomatic.  Advised patient if symptoms return to call clinic back.  Patient verbalized understanding and had no further questions.

## 2022-09-30 ENCOUNTER — OFFICE VISIT (OUTPATIENT)
Dept: FAMILY MEDICINE | Facility: CLINIC | Age: 62
End: 2022-09-30
Payer: COMMERCIAL

## 2022-09-30 VITALS
WEIGHT: 203.8 LBS | OXYGEN SATURATION: 99 % | TEMPERATURE: 97 F | RESPIRATION RATE: 14 BRPM | HEART RATE: 77 BPM | DIASTOLIC BLOOD PRESSURE: 78 MMHG | SYSTOLIC BLOOD PRESSURE: 108 MMHG | BODY MASS INDEX: 28.83 KG/M2

## 2022-09-30 DIAGNOSIS — R42 DIZZINESS: Primary | ICD-10-CM

## 2022-09-30 DIAGNOSIS — R17 ELEVATED BILIRUBIN: Primary | ICD-10-CM

## 2022-09-30 DIAGNOSIS — H61.21 IMPACTED CERUMEN OF RIGHT EAR: ICD-10-CM

## 2022-09-30 LAB
ALBUMIN SERPL-MCNC: 4.3 G/DL (ref 3.4–5)
ALP SERPL-CCNC: 63 U/L (ref 40–150)
ALT SERPL W P-5'-P-CCNC: 34 U/L (ref 0–70)
ANION GAP SERPL CALCULATED.3IONS-SCNC: 5 MMOL/L (ref 3–14)
AST SERPL W P-5'-P-CCNC: 22 U/L (ref 0–45)
BILIRUB SERPL-MCNC: 2 MG/DL (ref 0.2–1.3)
BUN SERPL-MCNC: 15 MG/DL (ref 7–30)
CALCIUM SERPL-MCNC: 9.8 MG/DL (ref 8.5–10.1)
CHLORIDE BLD-SCNC: 104 MMOL/L (ref 94–109)
CO2 SERPL-SCNC: 28 MMOL/L (ref 20–32)
CREAT SERPL-MCNC: 0.94 MG/DL (ref 0.66–1.25)
GFR SERPL CREATININE-BSD FRML MDRD: >90 ML/MIN/1.73M2
GLUCOSE BLD-MCNC: 119 MG/DL (ref 70–99)
HGB BLD-MCNC: 12.7 G/DL (ref 13.3–17.7)
POTASSIUM BLD-SCNC: 4 MMOL/L (ref 3.4–5.3)
PROT SERPL-MCNC: 7.7 G/DL (ref 6.8–8.8)
SODIUM SERPL-SCNC: 137 MMOL/L (ref 133–144)
TSH SERPL DL<=0.005 MIU/L-ACNC: 1.83 MU/L (ref 0.4–4)

## 2022-09-30 PROCEDURE — 90471 IMMUNIZATION ADMIN: CPT | Performed by: PHYSICIAN ASSISTANT

## 2022-09-30 PROCEDURE — 36415 COLL VENOUS BLD VENIPUNCTURE: CPT | Performed by: PHYSICIAN ASSISTANT

## 2022-09-30 PROCEDURE — 0124A COVID-19,PF,PFIZER BOOSTER BIVALENT: CPT | Performed by: PHYSICIAN ASSISTANT

## 2022-09-30 PROCEDURE — 99215 OFFICE O/P EST HI 40 MIN: CPT | Mod: 25 | Performed by: PHYSICIAN ASSISTANT

## 2022-09-30 PROCEDURE — 93000 ELECTROCARDIOGRAM COMPLETE: CPT | Mod: 59 | Performed by: PHYSICIAN ASSISTANT

## 2022-09-30 PROCEDURE — 91312 COVID-19,PF,PFIZER BOOSTER BIVALENT: CPT | Performed by: PHYSICIAN ASSISTANT

## 2022-09-30 PROCEDURE — 69209 REMOVE IMPACTED EAR WAX UNI: CPT | Mod: RT | Performed by: PHYSICIAN ASSISTANT

## 2022-09-30 PROCEDURE — 84443 ASSAY THYROID STIM HORMONE: CPT | Performed by: PHYSICIAN ASSISTANT

## 2022-09-30 PROCEDURE — 80053 COMPREHEN METABOLIC PANEL: CPT | Performed by: PHYSICIAN ASSISTANT

## 2022-09-30 PROCEDURE — 85018 HEMOGLOBIN: CPT | Performed by: PHYSICIAN ASSISTANT

## 2022-09-30 PROCEDURE — 90682 RIV4 VACC RECOMBINANT DNA IM: CPT | Performed by: PHYSICIAN ASSISTANT

## 2022-09-30 RX ORDER — MECLIZINE HYDROCHLORIDE 25 MG/1
25 TABLET ORAL 3 TIMES DAILY PRN
Qty: 25 TABLET | Refills: 0 | Status: SHIPPED | OUTPATIENT
Start: 2022-09-30

## 2022-09-30 ASSESSMENT — PAIN SCALES - GENERAL: PAINLEVEL: NO PAIN (0)

## 2022-09-30 NOTE — PATIENT INSTRUCTIONS
Compa Dougherty,    Thank you for allowing [facility] to manage your care.    I am unsure of the cause of your symptoms, but your exam is reassuring and this is likely benign peripheral vertigo. We will see what our workup shows.     If you develop worsening/changing symptoms at any time, please call 911 or go to the emergency department for evaluation.    I ordered some lab work, please go to the laboratory to get your studies.    I sent your prescriptions to your pharmacy.    I ordered an MRI, please call diagnostic imaging [phone] to schedule your test.    I made an as needed physical therapy referral, they will be calling in approximately 1 week to set up your appointment.  If you do not hear from them, please call the specialty number on your after visit summary.     Please allow 1-2 business days for our office to contact you in regards to your laboratory/radiological studies.  If not done so, I encourage you to login into [employer] ([email or web address] to review your results as well.     Drink 8-10 glasses of fluid daily to stay well-hydrated.    For dizziness, please use meclizine as prescribed. Do not use this medication while driving, operating machinery, with other sedating medications, or while drinking alcohol as it will make you drowsy.    Look up and try the Epley maneuver on YouTube.    If you have any questions or concerns, please feel free to call us at [phone]    Sincerely,    Enzo Walters PA-C    Did you know?      You can schedule a video visit for follow-up appointments as well as future appointments for certain conditions.  Please see the below link.     [email or web address]    If you have not already done so,  I encourage you to sign up for [employer] ([email or web address]).  This will allow you to review your results, securely communicate with a provider, and schedule virtual visits as well.

## 2022-10-01 NOTE — PROGRESS NOTES
mychart message sent. Bilirubin elevated and will recheck in 10-14 days. Labs otherwise reassuring.

## 2022-10-10 ENCOUNTER — LAB (OUTPATIENT)
Dept: LAB | Facility: CLINIC | Age: 62
End: 2022-10-10
Payer: COMMERCIAL

## 2022-10-10 DIAGNOSIS — R17 ELEVATED BILIRUBIN: ICD-10-CM

## 2022-10-10 PROCEDURE — 80076 HEPATIC FUNCTION PANEL: CPT

## 2022-10-10 PROCEDURE — 36415 COLL VENOUS BLD VENIPUNCTURE: CPT

## 2022-10-11 LAB
ALBUMIN SERPL-MCNC: 4.2 G/DL (ref 3.4–5)
ALP SERPL-CCNC: 55 U/L (ref 40–150)
ALT SERPL W P-5'-P-CCNC: 34 U/L (ref 0–70)
AST SERPL W P-5'-P-CCNC: 21 U/L (ref 0–45)
BILIRUB DIRECT SERPL-MCNC: 0.4 MG/DL (ref 0–0.2)
BILIRUB SERPL-MCNC: 1.8 MG/DL (ref 0.2–1.3)
PROT SERPL-MCNC: 7.4 G/DL (ref 6.8–8.8)

## 2022-11-08 DIAGNOSIS — R79.89 ABNORMAL LFTS: Primary | ICD-10-CM

## 2022-11-09 ENCOUNTER — OFFICE VISIT (OUTPATIENT)
Dept: FAMILY MEDICINE | Facility: CLINIC | Age: 62
End: 2022-11-09
Payer: COMMERCIAL

## 2022-11-09 VITALS
HEIGHT: 71 IN | RESPIRATION RATE: 16 BRPM | TEMPERATURE: 98 F | DIASTOLIC BLOOD PRESSURE: 88 MMHG | HEART RATE: 85 BPM | WEIGHT: 206 LBS | SYSTOLIC BLOOD PRESSURE: 126 MMHG | BODY MASS INDEX: 28.84 KG/M2 | OXYGEN SATURATION: 100 %

## 2022-11-09 DIAGNOSIS — R17 SERUM TOTAL BILIRUBIN ELEVATED: ICD-10-CM

## 2022-11-09 DIAGNOSIS — E11.65 TYPE 2 DIABETES MELLITUS WITH HYPERGLYCEMIA, WITHOUT LONG-TERM CURRENT USE OF INSULIN (H): Primary | ICD-10-CM

## 2022-11-09 DIAGNOSIS — E11.42 DIABETIC POLYNEUROPATHY ASSOCIATED WITH TYPE 2 DIABETES MELLITUS (H): ICD-10-CM

## 2022-11-09 DIAGNOSIS — K21.00 GASTROESOPHAGEAL REFLUX DISEASE WITH ESOPHAGITIS, UNSPECIFIED WHETHER HEMORRHAGE: ICD-10-CM

## 2022-11-09 DIAGNOSIS — K76.0 FATTY LIVER DISEASE, NONALCOHOLIC: ICD-10-CM

## 2022-11-09 LAB
ALBUMIN SERPL-MCNC: 4.3 G/DL (ref 3.4–5)
ALBUMIN SERPL-MCNC: 4.3 G/DL (ref 3.4–5)
ALP SERPL-CCNC: 67 U/L (ref 40–150)
ALP SERPL-CCNC: 67 U/L (ref 40–150)
ALT SERPL W P-5'-P-CCNC: 39 U/L (ref 0–70)
ALT SERPL W P-5'-P-CCNC: 39 U/L (ref 0–70)
ANION GAP SERPL CALCULATED.3IONS-SCNC: 5 MMOL/L (ref 3–14)
AST SERPL W P-5'-P-CCNC: 24 U/L (ref 0–45)
AST SERPL W P-5'-P-CCNC: 24 U/L (ref 0–45)
BILIRUB DIRECT SERPL-MCNC: 0.3 MG/DL (ref 0–0.2)
BILIRUB SERPL-MCNC: 1.8 MG/DL (ref 0.2–1.3)
BILIRUB SERPL-MCNC: 1.8 MG/DL (ref 0.2–1.3)
BUN SERPL-MCNC: 12 MG/DL (ref 7–30)
CALCIUM SERPL-MCNC: 9.5 MG/DL (ref 8.5–10.1)
CHLORIDE BLD-SCNC: 108 MMOL/L (ref 94–109)
CO2 SERPL-SCNC: 27 MMOL/L (ref 20–32)
CREAT SERPL-MCNC: 0.93 MG/DL (ref 0.66–1.25)
ERYTHROCYTE [DISTWIDTH] IN BLOOD BY AUTOMATED COUNT: 17.6 % (ref 10–15)
GFR SERPL CREATININE-BSD FRML MDRD: >90 ML/MIN/1.73M2
GLUCOSE BLD-MCNC: 163 MG/DL (ref 70–99)
HBA1C MFR BLD: 6.1 % (ref 0–5.6)
HCT VFR BLD AUTO: 40.4 % (ref 40–53)
HGB BLD-MCNC: 12 G/DL (ref 13.3–17.7)
MCH RBC QN AUTO: 19.1 PG (ref 26.5–33)
MCHC RBC AUTO-ENTMCNC: 29.7 G/DL (ref 31.5–36.5)
MCV RBC AUTO: 64 FL (ref 78–100)
PLATELET # BLD AUTO: 259 10E3/UL (ref 150–450)
POTASSIUM BLD-SCNC: 4.4 MMOL/L (ref 3.4–5.3)
PROT SERPL-MCNC: 7.4 G/DL (ref 6.8–8.8)
PROT SERPL-MCNC: 7.4 G/DL (ref 6.8–8.8)
RBC # BLD AUTO: 6.29 10E6/UL (ref 4.4–5.9)
SODIUM SERPL-SCNC: 140 MMOL/L (ref 133–144)
VIT B12 SERPL-MCNC: 346 PG/ML (ref 232–1245)
WBC # BLD AUTO: 7.6 10E3/UL (ref 4–11)

## 2022-11-09 PROCEDURE — 85027 COMPLETE CBC AUTOMATED: CPT | Performed by: PHYSICIAN ASSISTANT

## 2022-11-09 PROCEDURE — 80053 COMPREHEN METABOLIC PANEL: CPT | Performed by: PHYSICIAN ASSISTANT

## 2022-11-09 PROCEDURE — 82248 BILIRUBIN DIRECT: CPT | Performed by: PHYSICIAN ASSISTANT

## 2022-11-09 PROCEDURE — 83036 HEMOGLOBIN GLYCOSYLATED A1C: CPT | Performed by: PHYSICIAN ASSISTANT

## 2022-11-09 PROCEDURE — 82607 VITAMIN B-12: CPT | Performed by: PHYSICIAN ASSISTANT

## 2022-11-09 PROCEDURE — 99214 OFFICE O/P EST MOD 30 MIN: CPT | Performed by: PHYSICIAN ASSISTANT

## 2022-11-09 PROCEDURE — 84155 ASSAY OF PROTEIN SERUM: CPT | Mod: 59 | Performed by: PHYSICIAN ASSISTANT

## 2022-11-09 PROCEDURE — 84075 ASSAY ALKALINE PHOSPHATASE: CPT | Mod: 59 | Performed by: PHYSICIAN ASSISTANT

## 2022-11-09 PROCEDURE — 82247 BILIRUBIN TOTAL: CPT | Mod: 59 | Performed by: PHYSICIAN ASSISTANT

## 2022-11-09 PROCEDURE — 84460 ALANINE AMINO (ALT) (SGPT): CPT | Mod: 59 | Performed by: PHYSICIAN ASSISTANT

## 2022-11-09 PROCEDURE — 84450 TRANSFERASE (AST) (SGOT): CPT | Mod: 59 | Performed by: PHYSICIAN ASSISTANT

## 2022-11-09 PROCEDURE — 36415 COLL VENOUS BLD VENIPUNCTURE: CPT | Performed by: PHYSICIAN ASSISTANT

## 2022-11-09 RX ORDER — LISINOPRIL 2.5 MG/1
2.5 TABLET ORAL DAILY
Qty: 90 TABLET | Refills: 1 | Status: SHIPPED | OUTPATIENT
Start: 2022-11-09 | End: 2023-03-13

## 2022-11-09 RX ORDER — ATORVASTATIN CALCIUM 40 MG/1
40 TABLET, FILM COATED ORAL DAILY
Qty: 90 TABLET | Refills: 1 | Status: SHIPPED | OUTPATIENT
Start: 2022-11-09 | End: 2023-05-03

## 2022-11-09 RX ORDER — METFORMIN HCL 500 MG
1000 TABLET, EXTENDED RELEASE 24 HR ORAL 2 TIMES DAILY WITH MEALS
Qty: 360 TABLET | Refills: 1 | Status: SHIPPED | OUTPATIENT
Start: 2022-11-09 | End: 2023-05-03

## 2022-11-09 ASSESSMENT — PAIN SCALES - GENERAL: PAINLEVEL: NO PAIN (0)

## 2022-11-09 NOTE — Clinical Note
Please abstract the following data from this visit with this patient into the appropriate field in Epic:  Tests that can be patient reported without a hard copy:  Eye exam with ophthalmology on this date: 6/14/22 by [facility]  Other Tests found in the patient's chart through Chart Review/Care Everywhere:    Note to Abstraction: If this section is blank, no results were found via Chart Review/Care Everywhere.    
PRINCIPAL DISCHARGE DIAGNOSIS  Diagnosis: Kidney stone  Assessment and Plan of Treatment: Please follow up with Dr. Hoenig in 1-2 weeks.  Call [phone] to schedule/confirm your appointment.  Call or follow up sooner with fevers, chills, nausea, vomiting, increasing pain, or with other concerns.

## 2022-11-09 NOTE — PROGRESS NOTES
Assessment & Plan     Type 2 diabetes mellitus with hyperglycemia, without long-term current use of insulin (H)  A1C at goal and he has done a great job cutting down on carbohydrates since his diagnosis.    Will leave medication as is.   Had eye exam and abstracted.   - **A1C FUTURE 3mo  - Vitamin B12  - aspirin (ASA) 81 MG EC tablet; Take 1 tablet (81 mg) by mouth daily  - metFORMIN (GLUCOPHAGE XR) 500 MG 24 hr tablet; Take 2 tablets (1,000 mg) by mouth 2 times daily (with meals)  - lisinopril (ZESTRIL) 2.5 MG tablet; Take 1 tablet (2.5 mg) by mouth daily (kidney protection)  - blood glucose (NO BRAND SPECIFIED) test strip; Use to test blood sugar 1 times daily as needed or as directed.  - atorvastatin (LIPITOR) 40 MG tablet; Take 1 tablet (40 mg) by mouth daily (heart protection)    Serum total bilirubin elevated  Bilirubin level still elevated.  Had GB removed months ago.  The rest of the liver enzymes have normalized. Wife concerned about increased yellow in eyes (which is slight, but noted).   - CBC with platelets  - Comprehensive metabolic panel (BMP + Alb, Alk Phos, ALT, AST, Total. Bili, TP)  - Hepatic panel (Albumin, ALT, AST, Bili, Alk Phos, TP)  - Adult GI  Referral - Consult Only; Future    Diabetic polyneuropathy associated with type 2 diabetes mellitus (H)  Stable.     Gastroesophageal reflux disease with esophagitis, unspecified whether hemorrhage  Uses prilosec just PRN.   - omeprazole (PRILOSEC) 20 MG DR capsule; Take 1 capsule (20 mg) by mouth daily as needed (heart burn)    Fatty liver disease, nonalcoholic  Discussed fatty liver and he has cut down on carbohydrates significantly.  Hepatitis screening has been done in March and negative.  Referral to hepatologist advised.   - Adult GI  Referral - Consult Only; Future      30 minutes spent on the date of the encounter doing chart review, history and exam, documentation and further activities per the note       BMI:   Estimated  "body mass index is 29.14 kg/m  as calculated from the following:    Height as of this encounter: 1.791 m (5' 10.5\").    Weight as of this encounter: 93.4 kg (206 lb).   Weight management plan: Discussed healthy diet and exercise guidelines        Return in about 6 months (around 5/9/2023) for Physical Exam.    Helena Magaña PA-C  [facility] ASHLEE Dougherty is a 62 year old accompanied by his spouse, presenting for the following health issues:  Recheck Medication    Patient with history of Diabetes and hyperlipidemia presents for medication follow-up.     Patient would also like ears checked, had ear lavage and wants to make sure canals are clean. Pts wife also has concerns whites of eyes look yellow and she would also like his b12 levels to be checked in possible.     History of Present Illness       Diabetes:   He presents for follow up of diabetes.  He is checking home blood glucose a few times a week. He checks blood glucose before meals.  Blood glucose is never over 200 and never under 70. When his blood glucose is low, the patient is asymptomatic for confusion, blurred vision, lethargy and reports not feeling dizzy, shaky, or weak.  He has no concerns regarding his diabetes at this time.  He is having numbness in feet and burning in feet. The patient has had a diabetic eye exam in the last 12 months. Eye exam performed on 6/14/22. Location of last eye exam [facility].        He eats 2-3 servings of fruits and vegetables daily.He consumes 0 sweetened beverage(s) daily.He exercises with enough effort to increase his heart rate 9 or less minutes per day.  He exercises with enough effort to increase his heart rate 3 or less days per week.   He is taking medications regularly.     Really limiting carbs/sugars and this has helped quite a bit with diabetes.     Hyperlipidemia Follow-Up      Are you regularly taking any medication or supplement to lower your cholesterol?   Yes- " "Atorvastatin    Are you having muscle aches or other side effects that you think could be caused by your cholesterol lowering medication?  No    Medication Followup of Lisinopril(For Kidney Protection.)    Taking Medication as prescribed: Yes    Side Effects:  None    Medication Helping Symptoms:  Yes    Was having some dizziness in the past, possibly vertigo.  This seems to have improved.  Had issues with ear wax that was removed.  Has debrox at home he uses PRN.     Wife is concerned as his eye's seem more yellow than normal.  He hada  Cholecystectomy 3/21/2022 due to acute cholecystitis and also diagnosed with fatty liver disease.  He has cut his carb intake down drastically over the past year and diabetes is very nicely controlled.      He has had testing for HepA, B and C on 3/22/22 and all negative.       Review of Systems   Constitutional, HEENT, cardiovascular, pulmonary, GI, , musculoskeletal, neuro, skin, endocrine and psych systems are negative, except as otherwise noted.      Objective    /88   Pulse 85   Temp 98  F (36.7  C) (Tympanic)   Resp 16   Ht 1.791 m (5' 10.5\")   Wt 93.4 kg (206 lb)   SpO2 100%   BMI 29.14 kg/m    Body mass index is 29.14 kg/m .  Physical Exam   GENERAL: healthy, alert and no distress  EYES:  PERLL, slight yellow discoloration to conjunctiva. No yellowing of the skin.   NECK: no adenopathy, no asymmetry, masses, or scars and thyroid normal to palpation  RESP: lungs clear to auscultation - no rales, rhonchi or wheezes  CV: regular rate and rhythm, normal S1 S2, no S3 or S4, no murmur, click or rub, no peripheral edema and peripheral pulses strong  ABDOMEN: soft, nontender, no hepatosplenomegaly, no masses and bowel sounds normal  MS: no gross musculoskeletal defects noted, no edema    Results for orders placed or performed in visit on 11/09/22   Comprehensive metabolic panel (BMP + Alb, Alk Phos, ALT, AST, Total. Bili, TP)     Status: Abnormal   Result Value Ref " Range    Sodium 140 133 - 144 mmol/L    Potassium 4.4 3.4 - 5.3 mmol/L    Chloride 108 94 - 109 mmol/L    Carbon Dioxide (CO2) 27 20 - 32 mmol/L    Anion Gap 5 3 - 14 mmol/L    Urea Nitrogen 12 7 - 30 mg/dL    Creatinine 0.93 0.66 - 1.25 mg/dL    Calcium 9.5 8.5 - 10.1 mg/dL    Glucose 163 (H) 70 - 99 mg/dL    Alkaline Phosphatase 67 40 - 150 U/L    AST 24 0 - 45 U/L    ALT 39 0 - 70 U/L    Protein Total 7.4 6.8 - 8.8 g/dL    Albumin 4.3 3.4 - 5.0 g/dL    Bilirubin Total 1.8 (H) 0.2 - 1.3 mg/dL    GFR Estimate >90 >60 mL/min/1.73m2   **A1C FUTURE 3mo     Status: Abnormal   Result Value Ref Range    Hemoglobin A1C 6.1 (H) 0.0 - 5.6 %   Hepatic panel (Albumin, ALT, AST, Bili, Alk Phos, TP)     Status: Abnormal   Result Value Ref Range    Bilirubin Total 1.8 (H) 0.2 - 1.3 mg/dL    Bilirubin Direct 0.3 (H) 0.0 - 0.2 mg/dL    Protein Total 7.4 6.8 - 8.8 g/dL    Albumin 4.3 3.4 - 5.0 g/dL    Alkaline Phosphatase 67 40 - 150 U/L    AST 24 0 - 45 U/L    ALT 39 0 - 70 U/L

## 2022-11-17 NOTE — TELEPHONE ENCOUNTER
Diagnosis, Referred by & from: Fatty Liver   Appt date: 12/12/2022   NOTES STATUS DETAILS   OFFICE NOTE from referring provider Internal 11/9/2022 [facility]   OFFICE NOTE from other specialist N/A    DISCHARGE SUMMARY from hospital Internal 3/21/2022 [facility]   DISCHARGE REPORT from the ER N/A    OPERATIVE REPORT Internal 3/23/2022 CHOLECYSTECTOMY    MEDICATION LIST N/A    LABS     ANAL PAP/CEA N/A    BIOPSIES/PATHOLOGY RELATED TO DIAGNOSIS N/A    DIAGNOSTIC PROCEDURES     PFC TESTING (from the Pelvic floor center includes Manometry, PDNL, EMG, etc.) N/A    COLONOSCOPY N/A    UPPER ENDOSCOPY (EGD) Internal 3/22/2022 ENDOSCOPIC RETROGRADE CHOLANGIOPANCREATOGRAPHY, BILIARY SPHINCTEROTOMY STONE EXTRACTION   FLEX SIGMOIDOSCOPY N/A    ERCP N/A    IMAGING (DISC & REPORT)      CT Internal 3/21/2022 Abdomen [facility]   MRI N/A    XRAY N/A    ULTRASOUND  (ENDOANAL/ENDORECTAL) Internal 3/21/2022 Abdomen [facility]     Records Requested  11/17/22    Facility     Outcome

## 2022-12-12 ENCOUNTER — OFFICE VISIT (OUTPATIENT)
Dept: GASTROENTEROLOGY | Facility: CLINIC | Age: 62
End: 2022-12-12
Attending: PHYSICIAN ASSISTANT
Payer: COMMERCIAL

## 2022-12-12 ENCOUNTER — PRE VISIT (OUTPATIENT)
Dept: GASTROENTEROLOGY | Facility: CLINIC | Age: 62
End: 2022-12-12

## 2022-12-12 VITALS
BODY MASS INDEX: 28.67 KG/M2 | SYSTOLIC BLOOD PRESSURE: 121 MMHG | DIASTOLIC BLOOD PRESSURE: 78 MMHG | HEIGHT: 71 IN | WEIGHT: 204.8 LBS | OXYGEN SATURATION: 98 % | HEART RATE: 83 BPM | TEMPERATURE: 97.1 F

## 2022-12-12 DIAGNOSIS — K76.0 HEPATIC STEATOSIS: Primary | ICD-10-CM

## 2022-12-12 DIAGNOSIS — R17 SERUM TOTAL BILIRUBIN ELEVATED: ICD-10-CM

## 2022-12-12 DIAGNOSIS — K76.0 FATTY LIVER DISEASE, NONALCOHOLIC: ICD-10-CM

## 2022-12-12 PROCEDURE — 99204 OFFICE O/P NEW MOD 45 MIN: CPT | Performed by: STUDENT IN AN ORGANIZED HEALTH CARE EDUCATION/TRAINING PROGRAM

## 2022-12-12 RX ORDER — FLUCONAZOLE 150 MG/1
TABLET ORAL
COMMUNITY
Start: 2022-03-30 | End: 2024-03-05

## 2022-12-12 ASSESSMENT — PAIN SCALES - GENERAL: PAINLEVEL: NO PAIN (0)

## 2022-12-12 NOTE — LETTER
12/12/2022         RE: Farhat Tejada  [address] [place] [address]        Dear Colleague,    Thank you for referring your patient, Farhat Tejada, to the [facility]. Please see a copy of my visit note below.    [facility] New Patient Visit    Date of Visit: December 12, 2022    Reason for referral:  Fatty liver, elevated BR    Subjective: Mr. Mcintyre is a 62 year old man with a history of diabetes who presents for evaluation of hepatic steatosis and elevated bilirubin.    He was admitted 3/2022 for abdominal pain, found to have acute cholecystitis. Underwent ERCP prior to this, stones and sludge removed. Noted to have elevated LFTs around the time of surgery, peaked at  , then improved. LFTs has been normal since. In 2018 had mildly elevated AST 51 . Hepatitis B and C testing was negative at that time.     Imaging that admission showed fatty infiltration of the liver.  At that time he was also recently diagnosed with diabetes, with an A1c around 11.  Since then his A1c is improved to 6.  He has lost about 20-30 pounds.  Drinks about 1 drink a week.  Review of his labs, he has had a chronically elevated bilirubin, largely indirect.  Remainder liver enzymes have been normal.     ROS: 14 point ROS negative except for positives noted in HPI.    PMHx:  Past Medical History:   Diagnosis Date     Diabetes (H)      HLD (hyperlipidemia)      Obesity      Sleep apnea     does not like to use CPAP       PSHx:  Past Surgical History:   Procedure Laterality Date     CHOLECYSTECTOMY  03/23/2022     ENDOSCOPIC RETROGRADE CHOLANGIOPANCREATOGRAM N/A 3/22/2022    Procedure: ENDOSCOPIC RETROGRADE CHOLANGIOPANCREATOGRAPHY, BILIARY SPHINCTEROTOMY STONE EXTRACTION;  Surgeon: Farhat Rodriguez MD;  Location: [facility] OR     ESOPHAGOSCOPY, GASTROSCOPY, DUODENOSCOPY (EGD), COMBINED N/A 3/22/2022    Procedure: ESOPHAGOGASTRODUODENOSCOPY,  WITH FINE NEEDLE ASPIRATION BIOPSY, WITH ENDOSCOPIC ULTRASOUND GUIDANCE;  Surgeon: Farhat Rodriguez MD;  Location: [facility] OR     LAPAROSCOPIC CHOLECYSTECTOMY N/A 3/23/2022    Procedure: CHOLECYSTECTOMY, LAPAROSCOPIC;  Surgeon: Branden Ponce DO;  Location: [facility] OR     [facility]         FamHx:  Family History   Problem Relation Age of Onset     Hypertension Mother      Heart Failure Mother 36     Diabetes Father         Type 1     Hypertension Father      Cerebrovascular Disease Father 88     Thyroid Cancer Brother      Diabetes Paternal Aunt         Type 1   Dad may have had liver disease - had DM    SocHx:  Social History     Socioeconomic History     Marital status:      Spouse name: Not on file     Number of children: Not on file     Years of education: Not on file     Highest education level: Not on file   Occupational History     Not on file   Tobacco Use     Smoking status: Never     Smokeless tobacco: Never   Vaping Use     Vaping Use: Never used   Substance and Sexual Activity     Alcohol use: Yes     Drug use: No     Sexual activity: Yes     Partners: Female   Other Topics Concern     Parent/sibling w/ CABG, MI or angioplasty before 65F 55M? Yes     Comment: mother  at age 36 from heart condition    Social History Narrative     Not on file     Social Determinants of Health     Financial Resource Strain: Not on file   Food Insecurity: Not on file   Transportation Needs: Not on file   Physical Activity: Not on file   Stress: Not on file   Social Connections: Not on file   Intimate Partner Violence: Not on file   Housing Stability: Not on file       Medications:  Current Outpatient Medications   Medication     alcohol swab prep pads     aspirin (ASA) 81 MG EC tablet     atorvastatin (LIPITOR) 40 MG tablet     blood glucose (NO BRAND SPECIFIED) test strip     lisinopril (ZESTRIL) 2.5 MG tablet     meclizine (ANTIVERT) 25 MG tablet     metFORMIN  "(GLUCOPHAGE XR) 500 MG 24 hr tablet     omeprazole (PRILOSEC) 20 MG DR capsule     fluconazole (DIFLUCAN) 150 MG tablet     No current facility-administered medications for this visit.     No OTCs, herbals    Allergies:  No Known Allergies    Objective:  /78 (BP Location: Left arm, Patient Position: Sitting, Cuff Size: Adult Regular)   Pulse 83   Temp 97.1  F (36.2  C) (Oral)   Ht 1.791 m (5' 10.5\")   Wt 92.9 kg (204 lb 12.8 oz)   SpO2 98%   BMI 28.97 kg/m    Constitutional: pleasant man in NAD  Eyes: non icteric  Respiratory: Normal respiratory excursion   MSK: normal range of motion of visualized extremities  Abd: Non distended  Skin: No jaundice  Psychiatric: normal mood and orientation    Labs:  Last Comprehensive Metabolic Panel:  Sodium   Date Value Ref Range Status   11/09/2022 140 133 - 144 mmol/L Final   01/10/2020 136 133 - 144 mmol/L Final     Potassium   Date Value Ref Range Status   11/09/2022 4.4 3.4 - 5.3 mmol/L Final   01/10/2020 4.6 3.4 - 5.3 mmol/L Final     Chloride   Date Value Ref Range Status   11/09/2022 108 94 - 109 mmol/L Final   01/10/2020 104 94 - 109 mmol/L Final     Carbon Dioxide   Date Value Ref Range Status   01/10/2020 28 20 - 32 mmol/L Final     Carbon Dioxide (CO2)   Date Value Ref Range Status   11/09/2022 27 20 - 32 mmol/L Final     Anion Gap   Date Value Ref Range Status   11/09/2022 5 3 - 14 mmol/L Final   01/10/2020 4 3 - 14 mmol/L Final     Glucose   Date Value Ref Range Status   11/09/2022 163 (H) 70 - 99 mg/dL Final   01/10/2020 122 (H) 70 - 99 mg/dL Final     Comment:     Fasting specimen     Urea Nitrogen   Date Value Ref Range Status   11/09/2022 12 7 - 30 mg/dL Final   01/10/2020 10 7 - 30 mg/dL Final     Creatinine   Date Value Ref Range Status   11/09/2022 0.93 0.66 - 1.25 mg/dL Final   01/10/2020 1.02 0.66 - 1.25 mg/dL Final     GFR Estimate   Date Value Ref Range Status   11/09/2022 >90 >60 mL/min/1.73m2 Final     Comment:     Effective December 21, 2021 " eGFRcr in adults is calculated using the 2021 CKD-EPI creatinine equation which includes age and gender (Callie et al., NE, DOI: 10.1056/TUROfe3347010)   01/10/2020 79 >60 mL/min/[1.73_m2] Final     Comment:     Non  GFR Calc  Starting 12/18/2018, serum creatinine based estimated GFR (eGFR) will be   calculated using the Chronic Kidney Disease Epidemiology Collaboration   (CKD-EPI) equation.       Calcium   Date Value Ref Range Status   11/09/2022 9.5 8.5 - 10.1 mg/dL Final   01/10/2020 9.4 8.5 - 10.1 mg/dL Final     Bilirubin Total   Date Value Ref Range Status   11/09/2022 1.8 (H) 0.2 - 1.3 mg/dL Final   11/09/2022 1.8 (H) 0.2 - 1.3 mg/dL Final   01/10/2020 1.9 (H) 0.2 - 1.3 mg/dL Final     Alkaline Phosphatase   Date Value Ref Range Status   11/09/2022 67 40 - 150 U/L Final   11/09/2022 67 40 - 150 U/L Final   01/10/2020 60 40 - 150 U/L Final     ALT   Date Value Ref Range Status   11/09/2022 39 0 - 70 U/L Final   11/09/2022 39 0 - 70 U/L Final   01/10/2020 63 0 - 70 U/L Final     AST   Date Value Ref Range Status   11/09/2022 24 0 - 45 U/L Final   11/09/2022 24 0 - 45 U/L Final   01/10/2020 38 0 - 45 U/L Final       Lab Results   Component Value Date    WBC 7.6 11/09/2022     Lab Results   Component Value Date    RBC 6.29 11/09/2022     Lab Results   Component Value Date    HGB 12.0 11/09/2022     Lab Results   Component Value Date    HCT 40.4 11/09/2022     Lab Results   Component Value Date    MCV 64 11/09/2022     Lab Results   Component Value Date    MCH 19.1 11/09/2022     Lab Results   Component Value Date    MCHC 29.7 11/09/2022     Lab Results   Component Value Date    RDW 17.6 11/09/2022     Lab Results   Component Value Date     11/09/2022       No results found for: INR     Computed MELD-Na score unavailable. Necessary lab results were not found in the last year.  Computed MELD score unavailable. Necessary lab results were not found in the last year.    Imaging:    RUQ US  3/2022    FINDINGS:     GALLBLADDER: MYAH sign suggesting cholelithiasis. Sonographic Vaughan's sign negative. Gallbladder wall thickening, 5 mm.     BILE DUCTS: No biliary dilatation. The common duct measures 3 mm.     LIVER: Hepatic steatosis.     RIGHT KIDNEY: No hydronephrosis.     PANCREAS: Obscured by bowel gas.     No visible ascites.                                                                      IMPRESSION:  1.  Cholelithiasis.  2.  Mild gallbladder wall thickening where seen.  3.  Sonographic Vaughan's sign negative. No biliary dilatation.    CT AP 3/21/2022    FINDINGS:   LOWER CHEST: Normal.     HEPATOBILIARY: Hepatic steatosis. Debris in the gallbladder. No bile duct dilatation. There is a 7 mm enhancing focus in the right hepatic lobe (series 3 image 50).     PANCREAS: No significant mass, duct dilatation, or inflammatory change.     SPLEEN: Normal.     ADRENAL GLANDS: Normal.     KIDNEYS/BLADDER: A simple cyst at the lower pole of the right kidney does not require follow-up.      BOWEL: Normal.     LYMPH NODES: Mild haziness in the mesentery is nonspecific.      VASCULATURE: Normal.      PELVIC ORGANS: Normal.     MUSCULOSKELETAL: Normal.                                                                      IMPRESSION:   1.  Debris in the gallbladder. No bile duct dilatation.  2.  No pancreatic duct dilatation.  3.  Hepatic steatosis.  4.  A subcentimeter enhancing hepatic focus is indeterminate. This is likely a benign vascular lesion. No follow-up required.     Independently reviewed labs and imaging.     Assessment/Plan: Mr. Mcintyre is a 62 year old man with a history of diabetes who presents for evaluation of hepatic steatosis and elevated bilirubin.    Hepatic steatosis is likely secondary to nonalcoholic fatty liver disease, particularly at the time of his liver imaging when his diabetes is poorly controlled.  Since then his diabetes is much better controlled.  He does not have significant  "risk factors for alcohol-related liver disease.  Viral hepatitis testing is negative. FIB4 0.93, ruling out advanced fibrosis.  It is also very possible that the hepatic steatosis is improved with better control of his diabetes and weight loss.  We will repeat a right upper quadrant ultrasound to evaluate for this.  If he continues to hepatic steatosis, discussed the long-term management implications of NAFLD. Discussed the natural history of NAFLD, overtime steatosis and inflammation can lead to scarring and cirrhosis. Treatment is 5-10% weight loss with diet and exercise to reduce inflammation and scarring. Increasing physical activity, outside of weight loss, is also beneficial in NAFLD. For patients with NAFLD and DM, insulin sensitizing agents (GLP-1 receptor agonists, liraglutide/\"Victoza\", semaglutide/\"Ozempic\" subcutaneous or \"Rybelsus\" PO) can be considered for dual benefit of DM and MELENDEZ. Statins are not contraindicated in MELENDEZ, it should be used if indicated given the high risk of cardiovascular disease in this population.     Think his elevated bilirubin, that is largely indirect is from Guilbert syndrome.  I do not think he needs any further follow-up for this, discussed the benign nature of this.    Orders Placed This Encounter   Procedures     US Abdomen Limited     RTC PRN based on US    Terra Barragan MD MS  Hepatology/Liver Transplant  [facility]    Approximately 20 minutes was spent for the visit with 25 minutes of non face-to-face time were spent in review of the patient's medical record on the day of the visit. This included review of previous: clinic visits, hospital records, lab results, imaging studies, and documentation.  The findings from this review are summarized in the above note.          Again, thank you for allowing me to participate in the care of your patient.        Sincerely,        Terra Barragan MD    "

## 2022-12-12 NOTE — PROGRESS NOTES
[facility] New Patient Visit    Date of Visit: December 12, 2022    Reason for referral:  Fatty liver, elevated BR    Subjective: Mr. Mcintyre is a 62 year old man with a history of diabetes who presents for evaluation of hepatic steatosis and elevated bilirubin.    He was admitted 3/2022 for abdominal pain, found to have acute cholecystitis. Underwent ERCP prior to this, stones and sludge removed. Noted to have elevated LFTs around the time of surgery, peaked at  , then improved. LFTs has been normal since. In 2018 had mildly elevated AST 51 . Hepatitis B and C testing was negative at that time.     Imaging that admission showed fatty infiltration of the liver.  At that time he was also recently diagnosed with diabetes, with an A1c around 11.  Since then his A1c is improved to 6.  He has lost about 20-30 pounds.  Drinks about 1 drink a week.  Review of his labs, he has had a chronically elevated bilirubin, largely indirect.  Remainder liver enzymes have been normal.     ROS: 14 point ROS negative except for positives noted in HPI.    PMHx:  Past Medical History:   Diagnosis Date     Diabetes (H)      HLD (hyperlipidemia)      Obesity      Sleep apnea     does not like to use CPAP       PSHx:  Past Surgical History:   Procedure Laterality Date     CHOLECYSTECTOMY  03/23/2022     ENDOSCOPIC RETROGRADE CHOLANGIOPANCREATOGRAM N/A 3/22/2022    Procedure: ENDOSCOPIC RETROGRADE CHOLANGIOPANCREATOGRAPHY, BILIARY SPHINCTEROTOMY STONE EXTRACTION;  Surgeon: Farhat Rodriguez MD;  Location: [facility] OR     ESOPHAGOSCOPY, GASTROSCOPY, DUODENOSCOPY (EGD), COMBINED N/A 3/22/2022    Procedure: ESOPHAGOGASTRODUODENOSCOPY, WITH FINE NEEDLE ASPIRATION BIOPSY, WITH ENDOSCOPIC ULTRASOUND GUIDANCE;  Surgeon: Farhat Rodriguez MD;  Location: [facility] OR     LAPAROSCOPIC CHOLECYSTECTOMY N/A 3/23/2022    Procedure: CHOLECYSTECTOMY, LAPAROSCOPIC;  Surgeon: Branden Ponce DO;   [facility] OR     [facility]         FamHx:  Family History   Problem Relation Age of Onset     Hypertension Mother      Heart Failure Mother 36     Diabetes Father         Type 1     Hypertension Father      Cerebrovascular Disease Father 88     Thyroid Cancer Brother      Diabetes Paternal Aunt         Type 1   Dad may have had liver disease - had DM    SocHx:  Social History     Socioeconomic History     Marital status:      Spouse name: Not on file     Number of children: Not on file     Years of education: Not on file     Highest education level: Not on file   Occupational History     Not on file   Tobacco Use     Smoking status: Never     Smokeless tobacco: Never   Vaping Use     Vaping Use: Never used   Substance and Sexual Activity     Alcohol use: Yes     Drug use: No     Sexual activity: Yes     Partners: Female   Other Topics Concern     Parent/sibling w/ CABG, MI or angioplasty before 65F 55M? Yes     Comment: mother  at age 36 from heart condition    Social History Narrative     Not on file     Social Determinants of Health     Financial Resource Strain: Not on file   Food Insecurity: Not on file   Transportation Needs: Not on file   Physical Activity: Not on file   Stress: Not on file   Social Connections: Not on file   Intimate Partner Violence: Not on file   Housing Stability: Not on file       Medications:  Current Outpatient Medications   Medication     alcohol swab prep pads     aspirin (ASA) 81 MG EC tablet     atorvastatin (LIPITOR) 40 MG tablet     blood glucose (NO BRAND SPECIFIED) test strip     lisinopril (ZESTRIL) 2.5 MG tablet     meclizine (ANTIVERT) 25 MG tablet     metFORMIN (GLUCOPHAGE XR) 500 MG 24 hr tablet     omeprazole (PRILOSEC) 20 MG DR capsule     fluconazole (DIFLUCAN) 150 MG tablet     No current facility-administered medications for this visit.     No OTCs, herbals    Allergies:  No Known Allergies    Objective:  /78  "(BP Location: Left arm, Patient Position: Sitting, Cuff Size: Adult Regular)   Pulse 83   Temp 97.1  F (36.2  C) (Oral)   Ht 1.791 m (5' 10.5\")   Wt 92.9 kg (204 lb 12.8 oz)   SpO2 98%   BMI 28.97 kg/m    Constitutional: pleasant man in NAD  Eyes: non icteric  Respiratory: Normal respiratory excursion   MSK: normal range of motion of visualized extremities  Abd: Non distended  Skin: No jaundice  Psychiatric: normal mood and orientation    Labs:  Last Comprehensive Metabolic Panel:  Sodium   Date Value Ref Range Status   11/09/2022 140 133 - 144 mmol/L Final   01/10/2020 136 133 - 144 mmol/L Final     Potassium   Date Value Ref Range Status   11/09/2022 4.4 3.4 - 5.3 mmol/L Final   01/10/2020 4.6 3.4 - 5.3 mmol/L Final     Chloride   Date Value Ref Range Status   11/09/2022 108 94 - 109 mmol/L Final   01/10/2020 104 94 - 109 mmol/L Final     Carbon Dioxide   Date Value Ref Range Status   01/10/2020 28 20 - 32 mmol/L Final     Carbon Dioxide (CO2)   Date Value Ref Range Status   11/09/2022 27 20 - 32 mmol/L Final     Anion Gap   Date Value Ref Range Status   11/09/2022 5 3 - 14 mmol/L Final   01/10/2020 4 3 - 14 mmol/L Final     Glucose   Date Value Ref Range Status   11/09/2022 163 (H) 70 - 99 mg/dL Final   01/10/2020 122 (H) 70 - 99 mg/dL Final     Comment:     Fasting specimen     Urea Nitrogen   Date Value Ref Range Status   11/09/2022 12 7 - 30 mg/dL Final   01/10/2020 10 7 - 30 mg/dL Final     Creatinine   Date Value Ref Range Status   11/09/2022 0.93 0.66 - 1.25 mg/dL Final   01/10/2020 1.02 0.66 - 1.25 mg/dL Final     GFR Estimate   Date Value Ref Range Status   11/09/2022 >90 >60 mL/min/1.73m2 Final     Comment:     Effective December 21, 2021 eGFRcr in adults is calculated using the 2021 CKD-EPI creatinine equation which includes age and gender (Callie portillo al., NEJM, DOI: 10.1056/SNDPzg2417800)   01/10/2020 79 >60 mL/min/[1.73_m2] Final     Comment:     Non  GFR Calc  Starting 12/18/2018, " serum creatinine based estimated GFR (eGFR) will be   calculated using the Chronic Kidney Disease Epidemiology Collaboration   (CKD-EPI) equation.       Calcium   Date Value Ref Range Status   11/09/2022 9.5 8.5 - 10.1 mg/dL Final   01/10/2020 9.4 8.5 - 10.1 mg/dL Final     Bilirubin Total   Date Value Ref Range Status   11/09/2022 1.8 (H) 0.2 - 1.3 mg/dL Final   11/09/2022 1.8 (H) 0.2 - 1.3 mg/dL Final   01/10/2020 1.9 (H) 0.2 - 1.3 mg/dL Final     Alkaline Phosphatase   Date Value Ref Range Status   11/09/2022 67 40 - 150 U/L Final   11/09/2022 67 40 - 150 U/L Final   01/10/2020 60 40 - 150 U/L Final     ALT   Date Value Ref Range Status   11/09/2022 39 0 - 70 U/L Final   11/09/2022 39 0 - 70 U/L Final   01/10/2020 63 0 - 70 U/L Final     AST   Date Value Ref Range Status   11/09/2022 24 0 - 45 U/L Final   11/09/2022 24 0 - 45 U/L Final   01/10/2020 38 0 - 45 U/L Final       Lab Results   Component Value Date    WBC 7.6 11/09/2022     Lab Results   Component Value Date    RBC 6.29 11/09/2022     Lab Results   Component Value Date    HGB 12.0 11/09/2022     Lab Results   Component Value Date    HCT 40.4 11/09/2022     Lab Results   Component Value Date    MCV 64 11/09/2022     Lab Results   Component Value Date    MCH 19.1 11/09/2022     Lab Results   Component Value Date    MCHC 29.7 11/09/2022     Lab Results   Component Value Date    RDW 17.6 11/09/2022     Lab Results   Component Value Date     11/09/2022       No results found for: INR     Computed MELD-Na score unavailable. Necessary lab results were not found in the last year.  Computed MELD score unavailable. Necessary lab results were not found in the last year.    Imaging:    RUQ US 3/2022    FINDINGS:     GALLBLADDER: MYAH sign suggesting cholelithiasis. Sonographic Vaughan's sign negative. Gallbladder wall thickening, 5 mm.     BILE DUCTS: No biliary dilatation. The common duct measures 3 mm.     LIVER: Hepatic steatosis.     RIGHT KIDNEY: No  hydronephrosis.     PANCREAS: Obscured by bowel gas.     No visible ascites.                                                                      IMPRESSION:  1.  Cholelithiasis.  2.  Mild gallbladder wall thickening where seen.  3.  Sonographic Vaughan's sign negative. No biliary dilatation.    CT AP 3/21/2022    FINDINGS:   LOWER CHEST: Normal.     HEPATOBILIARY: Hepatic steatosis. Debris in the gallbladder. No bile duct dilatation. There is a 7 mm enhancing focus in the right hepatic lobe (series 3 image 50).     PANCREAS: No significant mass, duct dilatation, or inflammatory change.     SPLEEN: Normal.     ADRENAL GLANDS: Normal.     KIDNEYS/BLADDER: A simple cyst at the lower pole of the right kidney does not require follow-up.      BOWEL: Normal.     LYMPH NODES: Mild haziness in the mesentery is nonspecific.      VASCULATURE: Normal.      PELVIC ORGANS: Normal.     MUSCULOSKELETAL: Normal.                                                                      IMPRESSION:   1.  Debris in the gallbladder. No bile duct dilatation.  2.  No pancreatic duct dilatation.  3.  Hepatic steatosis.  4.  A subcentimeter enhancing hepatic focus is indeterminate. This is likely a benign vascular lesion. No follow-up required.     Independently reviewed labs and imaging.     Assessment/Plan: Mr. Mcintyre is a 62 year old man with a history of diabetes who presents for evaluation of hepatic steatosis and elevated bilirubin.    Hepatic steatosis is likely secondary to nonalcoholic fatty liver disease, particularly at the time of his liver imaging when his diabetes is poorly controlled.  Since then his diabetes is much better controlled.  He does not have significant risk factors for alcohol-related liver disease.  Viral hepatitis testing is negative. FIB4 0.93, ruling out advanced fibrosis.  It is also very possible that the hepatic steatosis is improved with better control of his diabetes and weight loss.  We will repeat a  "right upper quadrant ultrasound to evaluate for this.  If he continues to hepatic steatosis, discussed the long-term management implications of NAFLD. Discussed the natural history of NAFLD, overtime steatosis and inflammation can lead to scarring and cirrhosis. Treatment is 5-10% weight loss with diet and exercise to reduce inflammation and scarring. Increasing physical activity, outside of weight loss, is also beneficial in NAFLD. For patients with NAFLD and DM, insulin sensitizing agents (GLP-1 receptor agonists, liraglutide/\"Victoza\", semaglutide/\"Ozempic\" subcutaneous or \"Rybelsus\" PO) can be considered for dual benefit of DM and MELENDEZ. Statins are not contraindicated in MELENDEZ, it should be used if indicated given the high risk of cardiovascular disease in this population.     Think his elevated bilirubin, that is largely indirect is from Guilbert syndrome.  I do not think he needs any further follow-up for this, discussed the benign nature of this.    Orders Placed This Encounter   Procedures     US Abdomen Limited     RTC PRN based on US    Terra Barragan MD MS  Hepatology/Liver Transplant  [facility]    Approximately 20 minutes was spent for the visit with 25 minutes of non face-to-face time were spent in review of the patient's medical record on the day of the visit. This included review of previous: clinic visits, hospital records, lab results, imaging studies, and documentation.  The findings from this review are summarized in the above note.      "

## 2022-12-12 NOTE — NURSING NOTE
"Chief Complaint   Patient presents with     New Patient     New consult for fatty liver.     He requests these members of his care team be copied on today's visit information:     PCP: Helena Magaña    Referring Provider:  Helena Magaña PA-C  [facility]  [address] [place] [address]    Vitals:    12/12/22 1449   BP: 121/78   BP Location: Left arm   Patient Position: Sitting   Cuff Size: Adult Regular   Pulse: 83   Temp: 97.1  F (36.2  C)   TempSrc: Oral   SpO2: 98%   Weight: 92.9 kg (204 lb 12.8 oz)   Height: 1.791 m (5' 10.5\")       Body mass index is 28.97 kg/m .    Medications were reconciled.      Geeta Meadows CMA    "

## 2022-12-16 ENCOUNTER — ANCILLARY PROCEDURE (OUTPATIENT)
Dept: ULTRASOUND IMAGING | Facility: CLINIC | Age: 62
End: 2022-12-16
Attending: STUDENT IN AN ORGANIZED HEALTH CARE EDUCATION/TRAINING PROGRAM
Payer: COMMERCIAL

## 2022-12-16 DIAGNOSIS — K76.0 FATTY LIVER DISEASE, NONALCOHOLIC: ICD-10-CM

## 2022-12-16 PROCEDURE — 76705 ECHO EXAM OF ABDOMEN: CPT | Mod: GC | Performed by: RADIOLOGY

## 2023-03-11 ENCOUNTER — MYC MEDICAL ADVICE (OUTPATIENT)
Dept: FAMILY MEDICINE | Facility: CLINIC | Age: 63
End: 2023-03-11
Payer: COMMERCIAL

## 2023-03-11 DIAGNOSIS — E11.65 TYPE 2 DIABETES MELLITUS WITH HYPERGLYCEMIA, WITHOUT LONG-TERM CURRENT USE OF INSULIN (H): ICD-10-CM

## 2023-03-13 RX ORDER — LISINOPRIL 5 MG/1
5 TABLET ORAL DAILY
Qty: 90 TABLET | Refills: 0 | Status: SHIPPED | OUTPATIENT
Start: 2023-03-13 | End: 2023-03-20

## 2023-03-20 ENCOUNTER — TELEPHONE (OUTPATIENT)
Dept: FAMILY MEDICINE | Facility: CLINIC | Age: 63
End: 2023-03-20

## 2023-03-20 ENCOUNTER — VIRTUAL VISIT (OUTPATIENT)
Dept: FAMILY MEDICINE | Facility: CLINIC | Age: 63
End: 2023-03-20
Payer: COMMERCIAL

## 2023-03-20 DIAGNOSIS — E11.65 TYPE 2 DIABETES MELLITUS WITH HYPERGLYCEMIA, WITHOUT LONG-TERM CURRENT USE OF INSULIN (H): ICD-10-CM

## 2023-03-20 DIAGNOSIS — I10 HYPERTENSION GOAL BP (BLOOD PRESSURE) < 140/90: Primary | ICD-10-CM

## 2023-03-20 PROCEDURE — 99214 OFFICE O/P EST MOD 30 MIN: CPT | Mod: 95 | Performed by: PHYSICIAN ASSISTANT

## 2023-03-20 RX ORDER — LISINOPRIL 10 MG/1
10 TABLET ORAL DAILY
Qty: 90 TABLET | Refills: 0 | Status: SHIPPED | OUTPATIENT
Start: 2023-03-20 | End: 2023-04-03

## 2023-03-20 NOTE — TELEPHONE ENCOUNTER
Patient reports that his BP had improved the first few days after he started to take his lisinopril (ZESTRIL) 5 MG tablet on 3/13/23 ( just a week ago).     The past 3-4 days, he reports that his BP has been running higher again: 145/95 & 146/94.     He reports no headaches, however said he hears his heartbeat thumping in both ears (which started a few days ago).     Patient leaving on a cruise this afternoon. He is only available until about 3:00 PM today.     Patient wonders if he should increase his current Lisinopril dosage?     Please review and advise.     Manuela KAPOORN  [facility]

## 2023-03-20 NOTE — PATIENT INSTRUCTIONS
I suggest increasing the lisinopril from 5 mg to 10 mg daily.      Monitor blood pressures while on your trip, the goal is < 140/90 mmHg.  Make sure you are sitting/resting for 5 minutes, feet flat on the floor, no eating or drinking and wait at least an hour after a shower to check.      Monitor for any heart symptoms such as chest pain/pressure, dizziness, shortness of breath, vision changes, right arm/shoulder or jaw pain.  If you develop any of these symptoms, you need to be seen immediately (there should be a doctor on board the cruise ship).

## 2023-03-20 NOTE — TELEPHONE ENCOUNTER
Please schedule a virtual visit with me now.  I'd like to check in before his cruise.     Helena Magaña PA-C

## 2023-03-20 NOTE — PROGRESS NOTES
Farhat is a 62 year old who is being evaluated via a billable telephone visit.      What phone number would you like to be contacted at? phone  How would you like to obtain your AVS? Jamar  Distant Location (provider location):  On-site    Assessment & Plan     Type 2 diabetes mellitus with hyperglycemia, without long-term current use of insulin (H)  Was on lisinopril for renal protection, however BP's have been increasing.  We increased dose 2 weeks ago with improvement initially, however readings are now again higher than desired.     - lisinopril (ZESTRIL) 10 MG tablet; Take 1 tablet (10 mg) by mouth daily    Hypertension goal BP (blood pressure) < 140/90  Patient instructions:  I suggest increasing the lisinopril from 5 mg to 10 mg daily.      Monitor blood pressures while on your trip, the goal is < 140/90 mmHg.  Make sure you are sitting/resting for 5 minutes, feet flat on the floor, no eating or drinking and wait at least an hour after a shower to check.      Monitor for any heart symptoms such as chest pain/pressure, dizziness, shortness of breath, vision changes, right arm/shoulder or jaw pain.  If you develop any of these symptoms, you need to be seen immediately (there should be a doctor on board the cruise ship).      - lisinopril (ZESTRIL) 10 MG tablet; Take 1 tablet (10 mg) by mouth daily      9 minutes spent on the date of the encounter doing chart review, history and exam, documentation and further activities per the note           Return in about 6 weeks (around 5/1/2023) for office recheck. .    Helena Magaña PA-C  [facility] ASHLEE Dougherty is a 62 year old, presenting for the following health issues:  Hypertension      HPI       He has noticed BP's increasing over the past few weeks.    No chest pain or pressure.  No dizziness, no shortness of breath.  He denies any exertional symptoms either.    He does feel a     He does walk 30 minutes every day and does not  have any symptoms.     He reports no headaches, however said he hears his heartbeat thumping in both ears (which started a few days ago).     Going on a cruise for 2 weeks (has a BP monitor), leaving today at 3 pm (which is why we did a phone visit before he leaves).           Review of Systems   Constitutional, HEENT, cardiovascular, pulmonary, gi and gu systems are negative, except as otherwise noted.      Objective           Vitals:  No vitals were obtained today due to virtual visit.    Physical Exam   healthy, alert and no distress  PSYCH: Alert and oriented times 3; coherent speech, normal   rate and volume, able to articulate logical thoughts, able   to abstract reason, no tangential thoughts, no hallucinations   or delusions  His affect is normal  RESP: No cough, no audible wheezing, able to talk in full sentences  Remainder of exam unable to be completed due to telephone visits                Phone call duration: 6 minutes

## 2023-03-20 NOTE — TELEPHONE ENCOUNTER
No Spoke with patient, relayed providers(Helena Magaña) message and patient verbalized understanding.    Patient can be reached by phone at this time (#[phone]).  Samantha Beard RN

## 2023-03-28 ENCOUNTER — MYC MEDICAL ADVICE (OUTPATIENT)
Dept: FAMILY MEDICINE | Facility: CLINIC | Age: 63
End: 2023-03-28
Payer: COMMERCIAL

## 2023-04-03 ENCOUNTER — E-VISIT (OUTPATIENT)
Dept: FAMILY MEDICINE | Facility: CLINIC | Age: 63
End: 2023-04-03
Payer: COMMERCIAL

## 2023-04-03 DIAGNOSIS — R03.0 ELEVATED BLOOD PRESSURE READING: Primary | ICD-10-CM

## 2023-04-03 DIAGNOSIS — M79.622 PAIN OF LEFT UPPER ARM: ICD-10-CM

## 2023-04-03 DIAGNOSIS — I10 HYPERTENSION GOAL BP (BLOOD PRESSURE) < 140/90: ICD-10-CM

## 2023-04-03 DIAGNOSIS — E11.65 TYPE 2 DIABETES MELLITUS WITH HYPERGLYCEMIA, WITHOUT LONG-TERM CURRENT USE OF INSULIN (H): ICD-10-CM

## 2023-04-03 DIAGNOSIS — R06.02 EXERTIONAL SHORTNESS OF BREATH: ICD-10-CM

## 2023-04-03 PROCEDURE — 99421 OL DIG E/M SVC 5-10 MIN: CPT | Performed by: PHYSICIAN ASSISTANT

## 2023-04-03 RX ORDER — LISINOPRIL 20 MG/1
20 TABLET ORAL DAILY
Qty: 90 TABLET | Refills: 0 | Status: SHIPPED | OUTPATIENT
Start: 2023-04-03 | End: 2023-07-06

## 2023-04-03 NOTE — TELEPHONE ENCOUNTER
Provider E-Visit time total (minutes): 8 min    Helena, last week we talked prior to my getting on a ship, about my high BP.  You raised my lisinopril to 10 MG per day.  Since mar 20, the BP has remained high.  2 days after I got on the ship I was experiencing some chest pain, slight headache, and slight pain down my left arm.  I saw the ship doctor who did an EKG and troponin test, twice in 6 hours,  both were negative. He suggested doing a stress test as soon as I can.  Is the stress done at the clinic or would I need to see a cardiologist?

## 2023-04-10 ENCOUNTER — E-VISIT (OUTPATIENT)
Dept: FAMILY MEDICINE | Facility: CLINIC | Age: 63
End: 2023-04-10
Payer: COMMERCIAL

## 2023-04-10 ENCOUNTER — TELEPHONE (OUTPATIENT)
Dept: FAMILY MEDICINE | Facility: CLINIC | Age: 63
End: 2023-04-10

## 2023-04-10 DIAGNOSIS — R10.84 ABDOMINAL PAIN, GENERALIZED: Primary | ICD-10-CM

## 2023-04-10 PROCEDURE — 99207 PR NON-BILLABLE SERV PER CHARTING: CPT | Performed by: PHYSICIAN ASSISTANT

## 2023-04-10 NOTE — PATIENT INSTRUCTIONS
Robert Dougherty,   I am going to have my staff reach out to you can get you in prior to May.    Helena Magaña PA-C

## 2023-04-10 NOTE — TELEPHONE ENCOUNTER
Please call Farhat, he did an evisit and I can fit him in prior to his appt in May.      Please schedule an office visit (ok to use provider approval or same day slot).     Helena Magaña PA-C

## 2023-04-17 ENCOUNTER — OFFICE VISIT (OUTPATIENT)
Dept: FAMILY MEDICINE | Facility: CLINIC | Age: 63
End: 2023-04-17
Payer: COMMERCIAL

## 2023-04-17 VITALS
WEIGHT: 196 LBS | HEART RATE: 72 BPM | HEIGHT: 70 IN | RESPIRATION RATE: 16 BRPM | SYSTOLIC BLOOD PRESSURE: 130 MMHG | DIASTOLIC BLOOD PRESSURE: 80 MMHG | OXYGEN SATURATION: 100 % | BODY MASS INDEX: 28.06 KG/M2 | TEMPERATURE: 97.6 F

## 2023-04-17 DIAGNOSIS — I10 HYPERTENSION GOAL BP (BLOOD PRESSURE) < 140/90: ICD-10-CM

## 2023-04-17 DIAGNOSIS — K21.00 GASTROESOPHAGEAL REFLUX DISEASE WITH ESOPHAGITIS, UNSPECIFIED WHETHER HEMORRHAGE: ICD-10-CM

## 2023-04-17 DIAGNOSIS — Z78.9 VEGETARIAN DIET: ICD-10-CM

## 2023-04-17 DIAGNOSIS — R07.89 MIDSTERNAL CHEST PAIN: ICD-10-CM

## 2023-04-17 DIAGNOSIS — E11.65 TYPE 2 DIABETES MELLITUS WITH HYPERGLYCEMIA, WITHOUT LONG-TERM CURRENT USE OF INSULIN (H): Primary | ICD-10-CM

## 2023-04-17 DIAGNOSIS — R42 LIGHTHEADEDNESS: ICD-10-CM

## 2023-04-17 DIAGNOSIS — R51.9 NONINTRACTABLE HEADACHE, UNSPECIFIED CHRONICITY PATTERN, UNSPECIFIED HEADACHE TYPE: ICD-10-CM

## 2023-04-17 DIAGNOSIS — E11.42 DIABETIC POLYNEUROPATHY ASSOCIATED WITH TYPE 2 DIABETES MELLITUS (H): ICD-10-CM

## 2023-04-17 PROCEDURE — 99215 OFFICE O/P EST HI 40 MIN: CPT | Performed by: PHYSICIAN ASSISTANT

## 2023-04-17 NOTE — PATIENT INSTRUCTIONS
Check with your brother to see if he has medullary thyroid carcinoma.  If so, i'll add this to your history and we would want to stay away from the GLP-1 agonist class of diabetes meds (Ozempic).     Please call [facility] at [phone]  to set up the MRI of the brain (with and without contrast)    If you are feeling lightheaded or that thumping sensation in your ears/neck, I suggest you put on the apple watch and check the EKG.   We'll check the stress echo as planned.     I suggest you take omeprazole in the morning.     Leave the rest of your meds as is for now.

## 2023-04-17 NOTE — PROGRESS NOTES
Assessment & Plan     Type 2 diabetes mellitus with hyperglycemia, without long-term current use of insulin (H)  Will recheck fasting labs tomorrow.  Levels have improved nicely.     Diabetic polyneuropathy associated with type 2 diabetes mellitus (H)  Will recheck levels.   - HEMOGLOBIN A1C; Future  - Lipid panel reflex to direct LDL Non-fasting; Future    Hypertension goal BP (blood pressure) < 140/90  BP's at home have been under better control over the past 5 days.  Will continue to monitor for now.  He recently started monitoring home BP's when he switched to a vegan diet.  He noticed readings were high when he started this screening.  We have increased his lisinopril from 2.5 mg (kidney protection) up to BP reduction dose of 20 mg.  It seems that this higher dose has now improved these home BP readings, will leave as is.  He does at times feel lightheaded when getting up (therefore will be cautious to avoid pushing pressures too low). This has been an issue for years he states.     Nonintractable headache, unspecified chronicity pattern, unspecified headache type  Neuro exam normal, reassured Farhat and his wife.  I do suggest further evaluation with an MRI/MRA of the brain.   - MR Brain w/o & w Contrast; Future    Lightheadedness  Will check labs, reassured neuro exam normal.    If you are feeling lightheaded or that thumping sensation in your ears/neck, I suggest you put on the apple watch and check the EKG.   We'll check the stress echo as planned.     Exam normal today, I do not hear any signs of a fib.  Heart murmur not appreciated on today's exam.     - Comprehensive metabolic panel (BMP + Alb, Alk Phos, ALT, AST, Total. Bili, TP); Future  - TSH with free T4 reflex; Future  - MR Brain w/o & w Contrast; Future  - CBC with platelets and differential; Future    Midsternal chest pain  May be due to reflux.  I suggest switching prilosec dosing to morning (may have better meal time coverage).  He does have a  "stress test scheduled in 2 weeks.       Gastroesophageal reflux disease with esophagitis, unspecified whether hemorrhage  Start taking omeprazole in am.     Vegetarian diet  And on Metformin, will monitor annually.     - Vitamin B12; Future      55 minutes spent by me on the date of the encounter doing chart review, history and exam, documentation and further activities per the note       FUTURE APPOINTMENTS:       - Follow-up visit after above testing complete.    If he develops any worsening chest pain that does not resolve with gas relief, worsening headache or dizziness, he should be evaluated in ER.      Helena Magaña PA-C  [facility] ASHLEE Dougherty is a 62 year old, presenting for the following health issues:  Recheck Medication         View : No data to display.              Patient with history of diabetes, hyperlipidemia and hypertension arrived for medication follow-up. Pt is not fasting for labs.     History of Present Illness       Diabetes:   He presents for follow up of diabetes.  He is checking home blood glucose one time daily. He checks blood glucose before meals.  Blood glucose is never over 200 and never under 70. He is aware of hypoglycemia symptoms including shakiness and dizziness. He has no concerns regarding his diabetes at this time.  He is having numbness in feet and burning in feet.         Headaches:   Since the patient's last clinic visit, headaches are: worsened  The patient is getting headaches:  Dull headaches 1 or 2 per week  He is able to do normal daily activities when he has a migraine.  The patient is taking the following rescue/relief medications:  Ibuprofen (Advil, Motrin)   Patient states \"I get total relief\" from the rescue/relief medications.   The patient is taking the following medications to prevent migraines:  No medications to prevent migraines  In the past 4 weeks, the patient has gone to an Urgent Care or Emergency Room 0 times times " due to headaches.    Vascular Disease:  He presents for follow up of vascular disease.  He never takes nitroglycerin. He takes daily aspirin.    He eats 2-3 servings of fruits and vegetables daily.He consumes 0 sweetened beverage(s) daily.He exercises with enough effort to increase his heart rate 9 or less minutes per day.  He exercises with enough effort to increase his heart rate 3 or less days per week.   He is taking medications regularly.     Hyperlipidemia Follow-Up      Are you regularly taking any medication or supplement to lower your cholesterol?   Yes- Atorvastatin    Are you having muscle aches or other side effects that you think could be caused by your cholesterol lowering medication?  No    Hypertension Follow-up      Do you check your blood pressure regularly outside of the clinic? Yes     Are you following a low salt diet? Yes - doesn't add salt     Are your blood pressures ever more than 140 on the top number (systolic) OR more   than 90 on the bottom number (diastolic), for example 140/90? Yes - averaging around 140/85 but in the last 5 days bps have been within normal range.  120/80 mmHg's last few days.     He started checking his blood pressures at home when he started a vegetarian diet (he did not have a new onset high blood pressure prior to cruise, just started to monitor it at home).   He was evaluated by a doctor onboard cruise ship and EKG/Troponins were negative.  Slight heart murmur was noted at that time.  He does have a stress echo scheduled in 2 weeks.         Was having midsternal chest pains more frequently in the past few months (which led to the med visit on the cruise ship).  He has had some excessive gas and feels similar to GERD.      Pain is sharp and midsternal.  Burp will relieve the pain.   This pain does not come on with exertion.  He's been limiting activity since his vacation.     He did have some discomfort in his left arm/tingling that comes on and off (about 1 hour).  " Seems to correlate with the sternal pain.   Does not go away after relieving gas.     He has some lightheadedness (like a pressure sensation).    Has slight headache, dull, lasts a few minutes.  Feels it more in the front, sometimes in the back.  He does have a history of cluster headaches (this is not as severe as those).   No nausea or vomiting.   No vision changes currently.   Brother recently found to have a brain tumor.     Feeling more of a heart thumping sensation (can hear pulse in ears).     Now using prilosec daily since his cruise a couple weeks ago (and the symptoms have improved some).    Feb 14th, 2023 started a vegan diet.             Review of Systems   Constitutional, HEENT, cardiovascular, pulmonary, GI, , musculoskeletal, neuro, skin, endocrine and psych systems are negative, except as otherwise noted.      Objective    /80   Pulse 72   Temp 97.6  F (36.4  C) (Tympanic)   Resp 16   Ht 1.78 m (5' 10.08\")   Wt 88.9 kg (196 lb)   SpO2 100%   BMI 28.06 kg/m    Body mass index is 28.06 kg/m .  Physical Exam   GENERAL: healthy, alert and no distress  EYES: Eyes grossly normal to inspection, PERRL and conjunctivae and sclerae normal  HENT: ear canals and TM's normal, nose and mouth without ulcers or lesions  NECK: no adenopathy, no asymmetry, masses, or scars and thyroid normal to palpation  RESP: lungs clear to auscultation - no rales, rhonchi or wheezes  CV: regular rate and rhythm, normal S1 S2, no S3 or S4, no murmur, click or rub, no peripheral edema and peripheral pulses strong  ABDOMEN: soft, nontender, no hepatosplenomegaly, no masses and bowel sounds normal  MS: no gross musculoskeletal defects noted, no edema  SKIN: no suspicious lesions or rashes  NEURO: Normal strength and tone, mentation intact and speech normal  PSYCH: mentation appears normal, affect normal/bright    Neurological Examination:  Mental Status:  Alert and oriented to time, place, and person.  Recent and remote " memory intact.  Attention span and concentration normal.  Adequate fund of knowledge.  Speech:  Normal  Cranial Nerves:  Cranial Nerve #2: Visual acuity normal.  Pupils equal and reacting to light and to accomodation.    Cranial #3, 4, 6:  Eye movements normal in all directions of gaze  Cranial #5: Normal pinprick sensation over the trigeminal, ophthalmic and mandibular division bilaterally.  Motor function normal.  Cranial #7: Face symmetrical in appearance and movement.  Cranial #8: Normal hearing.  Cranial #9 & 10: Normal palate and uvula movement  Cranial #11:  Shoulder shrug symmetrical  Cranial #12:  Tongue midline with normal movements.  Motor:  Tone:  Normal in both upper and lower limbs.  Bulk:  Normal in both upper and lower limbs  Power: No drift of the outstretched hands.  Normal strength in all muscle groups (5/5) of both upper and lower limbs.  Coordination: heel-shin test normal bilaterally  Reflexes: Deep tendon reflexes 2+ and symmetrical both sides lower extremities.  Gait:  Walk with a normal stride length and normal arm swing.  Normal tandem walking.  Can stand/walk on heels and toes.  Romberg sign: Negative.

## 2023-04-20 ENCOUNTER — LAB (OUTPATIENT)
Dept: LAB | Facility: CLINIC | Age: 63
End: 2023-04-20
Payer: COMMERCIAL

## 2023-04-20 DIAGNOSIS — Z78.9 VEGETARIAN DIET: ICD-10-CM

## 2023-04-20 DIAGNOSIS — R42 LIGHTHEADEDNESS: ICD-10-CM

## 2023-04-20 DIAGNOSIS — E11.42 DIABETIC POLYNEUROPATHY ASSOCIATED WITH TYPE 2 DIABETES MELLITUS (H): ICD-10-CM

## 2023-04-20 LAB
ALBUMIN SERPL-MCNC: 4.4 G/DL (ref 3.4–5)
ALP SERPL-CCNC: 58 U/L (ref 40–150)
ALT SERPL W P-5'-P-CCNC: 33 U/L (ref 0–70)
ANION GAP SERPL CALCULATED.3IONS-SCNC: 3 MMOL/L (ref 3–14)
AST SERPL W P-5'-P-CCNC: 23 U/L (ref 0–45)
BILIRUB SERPL-MCNC: 1.9 MG/DL (ref 0.2–1.3)
BUN SERPL-MCNC: 16 MG/DL (ref 7–30)
CALCIUM SERPL-MCNC: 9.6 MG/DL (ref 8.5–10.1)
CHLORIDE BLD-SCNC: 109 MMOL/L (ref 94–109)
CHOLEST SERPL-MCNC: 98 MG/DL
CO2 SERPL-SCNC: 27 MMOL/L (ref 20–32)
CREAT SERPL-MCNC: 0.95 MG/DL (ref 0.66–1.25)
FASTING STATUS PATIENT QL REPORTED: YES
GFR SERPL CREATININE-BSD FRML MDRD: 90 ML/MIN/1.73M2
GLUCOSE BLD-MCNC: 109 MG/DL (ref 70–99)
HBA1C MFR BLD: 6 % (ref 0–5.6)
HDLC SERPL-MCNC: 52 MG/DL
LDLC SERPL CALC-MCNC: 31 MG/DL
NONHDLC SERPL-MCNC: 46 MG/DL
POTASSIUM BLD-SCNC: 4.3 MMOL/L (ref 3.4–5.3)
PROT SERPL-MCNC: 7.6 G/DL (ref 6.8–8.8)
SODIUM SERPL-SCNC: 139 MMOL/L (ref 133–144)
TRIGL SERPL-MCNC: 77 MG/DL
TSH SERPL DL<=0.005 MIU/L-ACNC: 2.24 MU/L (ref 0.4–4)
VIT B12 SERPL-MCNC: 480 PG/ML (ref 232–1245)

## 2023-04-20 PROCEDURE — 83036 HEMOGLOBIN GLYCOSYLATED A1C: CPT

## 2023-04-20 PROCEDURE — 80050 GENERAL HEALTH PANEL: CPT

## 2023-04-20 PROCEDURE — 80061 LIPID PANEL: CPT

## 2023-04-20 PROCEDURE — 82607 VITAMIN B-12: CPT

## 2023-04-20 PROCEDURE — 36415 COLL VENOUS BLD VENIPUNCTURE: CPT

## 2023-04-21 LAB
BASOPHILS # BLD AUTO: 0 10E3/UL (ref 0–0.2)
BASOPHILS NFR BLD AUTO: 0 %
DACRYOCYTES BLD QL SMEAR: SLIGHT
ELLIPTOCYTES BLD QL SMEAR: ABNORMAL
EOSINOPHIL # BLD AUTO: 0.1 10E3/UL (ref 0–0.7)
EOSINOPHIL NFR BLD AUTO: 2 %
ERYTHROCYTE [DISTWIDTH] IN BLOOD BY AUTOMATED COUNT: 19.1 % (ref 10–15)
FRAGMENTS BLD QL SMEAR: ABNORMAL
HCT VFR BLD AUTO: 41.1 % (ref 40–53)
HGB BLD-MCNC: 12.3 G/DL (ref 13.3–17.7)
IMM GRANULOCYTES # BLD: 0 10E3/UL
IMM GRANULOCYTES NFR BLD: 0 %
LYMPHOCYTES # BLD AUTO: 1.9 10E3/UL (ref 0.8–5.3)
LYMPHOCYTES NFR BLD AUTO: 30 %
MCH RBC QN AUTO: 18.1 PG (ref 26.5–33)
MCHC RBC AUTO-ENTMCNC: 29.9 G/DL (ref 31.5–36.5)
MCV RBC AUTO: 61 FL (ref 78–100)
MONOCYTES # BLD AUTO: 0.5 10E3/UL (ref 0–1.3)
MONOCYTES NFR BLD AUTO: 8 %
NEUTROPHILS # BLD AUTO: 3.9 10E3/UL (ref 1.6–8.3)
NEUTROPHILS NFR BLD AUTO: 60 %
NRBC # BLD AUTO: 0 10E3/UL
NRBC BLD AUTO-RTO: 0 /100
PLAT MORPH BLD: ABNORMAL
PLATELET # BLD AUTO: 275 10E3/UL (ref 150–450)
RBC # BLD AUTO: 6.78 10E6/UL (ref 4.4–5.9)
RBC MORPH BLD: ABNORMAL
TARGETS BLD QL SMEAR: SLIGHT
WBC # BLD AUTO: 6.4 10E3/UL (ref 4–11)

## 2023-04-22 ENCOUNTER — HEALTH MAINTENANCE LETTER (OUTPATIENT)
Age: 63
End: 2023-04-22

## 2023-04-24 DIAGNOSIS — R71.8 ELEVATED RED BLOOD CELL COUNT: Primary | ICD-10-CM

## 2023-04-24 DIAGNOSIS — D58.1 ELLIPTOCYTOSIS ON PERIPHERAL BLOOD SMEAR (H): ICD-10-CM

## 2023-04-25 ENCOUNTER — PATIENT OUTREACH (OUTPATIENT)
Dept: ONCOLOGY | Facility: CLINIC | Age: 63
End: 2023-04-25
Payer: COMMERCIAL

## 2023-04-25 ENCOUNTER — MYC MEDICAL ADVICE (OUTPATIENT)
Dept: FAMILY MEDICINE | Facility: CLINIC | Age: 63
End: 2023-04-25
Payer: COMMERCIAL

## 2023-04-26 ENCOUNTER — ANCILLARY ORDERS (OUTPATIENT)
Dept: FAMILY MEDICINE | Facility: CLINIC | Age: 63
End: 2023-04-26

## 2023-04-26 ENCOUNTER — ANCILLARY PROCEDURE (OUTPATIENT)
Dept: MRI IMAGING | Facility: CLINIC | Age: 63
End: 2023-04-26
Attending: PHYSICIAN ASSISTANT
Payer: COMMERCIAL

## 2023-04-26 DIAGNOSIS — R51.9 NONINTRACTABLE HEADACHE, UNSPECIFIED CHRONICITY PATTERN, UNSPECIFIED HEADACHE TYPE: ICD-10-CM

## 2023-04-26 DIAGNOSIS — R42 LIGHTHEADEDNESS: ICD-10-CM

## 2023-04-26 PROCEDURE — A9585 GADOBUTROL INJECTION: HCPCS | Performed by: RADIOLOGY

## 2023-04-26 PROCEDURE — 70544 MR ANGIOGRAPHY HEAD W/O DYE: CPT | Mod: TC | Performed by: RADIOLOGY

## 2023-04-26 PROCEDURE — 70553 MRI BRAIN STEM W/O & W/DYE: CPT | Mod: TC | Performed by: RADIOLOGY

## 2023-04-26 RX ORDER — GADOBUTROL 604.72 MG/ML
9 INJECTION INTRAVENOUS ONCE
Status: COMPLETED | OUTPATIENT
Start: 2023-04-26 | End: 2023-04-26

## 2023-04-26 RX ADMIN — GADOBUTROL 9 ML: 604.72 INJECTION INTRAVENOUS at 09:08

## 2023-05-02 ENCOUNTER — ANCILLARY PROCEDURE (OUTPATIENT)
Dept: CARDIOLOGY | Facility: CLINIC | Age: 63
End: 2023-05-02
Attending: PHYSICIAN ASSISTANT
Payer: COMMERCIAL

## 2023-05-02 ENCOUNTER — TRANSFERRED RECORDS (OUTPATIENT)
Dept: HEALTH INFORMATION MANAGEMENT | Facility: CLINIC | Age: 63
End: 2023-05-02

## 2023-05-02 DIAGNOSIS — M79.622 PAIN OF LEFT UPPER ARM: ICD-10-CM

## 2023-05-02 DIAGNOSIS — R03.0 ELEVATED BLOOD PRESSURE READING: ICD-10-CM

## 2023-05-02 DIAGNOSIS — R06.02 EXERTIONAL SHORTNESS OF BREATH: ICD-10-CM

## 2023-05-02 PROCEDURE — 93016 CV STRESS TEST SUPVJ ONLY: CPT | Performed by: INTERNAL MEDICINE

## 2023-05-02 PROCEDURE — 93018 CV STRESS TEST I&R ONLY: CPT | Performed by: STUDENT IN AN ORGANIZED HEALTH CARE EDUCATION/TRAINING PROGRAM

## 2023-05-02 PROCEDURE — 93321 DOPPLER ECHO F-UP/LMTD STD: CPT | Mod: 26 | Performed by: STUDENT IN AN ORGANIZED HEALTH CARE EDUCATION/TRAINING PROGRAM

## 2023-05-02 PROCEDURE — 93325 DOPPLER ECHO COLOR FLOW MAPG: CPT | Mod: TC | Performed by: INTERNAL MEDICINE

## 2023-05-02 PROCEDURE — 93352 ADMIN ECG CONTRAST AGENT: CPT | Performed by: INTERNAL MEDICINE

## 2023-05-02 PROCEDURE — 93321 DOPPLER ECHO F-UP/LMTD STD: CPT | Mod: TC | Performed by: INTERNAL MEDICINE

## 2023-05-02 PROCEDURE — 93017 CV STRESS TEST TRACING ONLY: CPT | Performed by: INTERNAL MEDICINE

## 2023-05-02 PROCEDURE — 93350 STRESS TTE ONLY: CPT | Mod: 26 | Performed by: STUDENT IN AN ORGANIZED HEALTH CARE EDUCATION/TRAINING PROGRAM

## 2023-05-02 PROCEDURE — 93325 DOPPLER ECHO COLOR FLOW MAPG: CPT | Mod: 26 | Performed by: STUDENT IN AN ORGANIZED HEALTH CARE EDUCATION/TRAINING PROGRAM

## 2023-05-02 PROCEDURE — 99207 PR STATISTIC IV PUSH SINGLE INITIAL SUBSTANCE: CPT | Performed by: INTERNAL MEDICINE

## 2023-05-02 PROCEDURE — 93350 STRESS TTE ONLY: CPT | Mod: TC | Performed by: INTERNAL MEDICINE

## 2023-05-02 RX ADMIN — Medication 3 ML: at 09:04

## 2023-05-26 NOTE — TELEPHONE ENCOUNTER
RECORDS STATUS - ALL OTHER DIAGNOSIS      RECORDS RECEIVED FROM: [facility]   DATE RECEIVED: 5/26   NOTES STATUS DETAILS   OFFICE NOTE from referring provider Helena Miller PA-C in  FAMILY PRACTICE: 4/17/3   DISCHARGE SUMMARY from hospital [facility] 3/21/22   DISCHARGE REPORT from the ER Epic/[facility] - Jennifer 7/9/22: [facility]    3/21/23: [facility]   MEDICATION LIST [facility] 5/3/23   LABS     ANYTHING RELATED TO DIAGNOSIS Epic 4/20/23

## 2023-05-30 LAB
DAT POLY: NEGATIVE
SPECIMEN EXPIRATION DATE: NORMAL

## 2023-05-31 ENCOUNTER — PRE VISIT (OUTPATIENT)
Dept: ONCOLOGY | Facility: CLINIC | Age: 63
End: 2023-05-31

## 2023-05-31 ENCOUNTER — ONCOLOGY VISIT (OUTPATIENT)
Dept: ONCOLOGY | Facility: CLINIC | Age: 63
End: 2023-05-31
Attending: PHYSICIAN ASSISTANT
Payer: COMMERCIAL

## 2023-05-31 VITALS
RESPIRATION RATE: 16 BRPM | HEIGHT: 70 IN | BODY MASS INDEX: 28.49 KG/M2 | HEART RATE: 74 BPM | WEIGHT: 199 LBS | DIASTOLIC BLOOD PRESSURE: 86 MMHG | SYSTOLIC BLOOD PRESSURE: 127 MMHG | OXYGEN SATURATION: 100 %

## 2023-05-31 DIAGNOSIS — R71.8 ELEVATED RED BLOOD CELL COUNT: ICD-10-CM

## 2023-05-31 DIAGNOSIS — D50.9 MICROCYTIC ANEMIA: Primary | ICD-10-CM

## 2023-05-31 DIAGNOSIS — D58.1 ELLIPTOCYTOSIS ON PERIPHERAL BLOOD SMEAR (H): ICD-10-CM

## 2023-05-31 LAB
BASOPHILS # BLD AUTO: 0 10E3/UL (ref 0–0.2)
BASOPHILS NFR BLD AUTO: 0 %
BILIRUB DIRECT SERPL-MCNC: 0.4 MG/DL (ref 0–0.2)
BILIRUB SERPL-MCNC: 1.9 MG/DL (ref 0.2–1.3)
BURR CELLS BLD QL SMEAR: SLIGHT
DACRYOCYTES BLD QL SMEAR: SLIGHT
ELLIPTOCYTES BLD QL SMEAR: ABNORMAL
EOSINOPHIL # BLD AUTO: 0.1 10E3/UL (ref 0–0.7)
EOSINOPHIL NFR BLD AUTO: 1 %
ERYTHROCYTE [DISTWIDTH] IN BLOOD BY AUTOMATED COUNT: 19.5 % (ref 10–15)
FERRITIN SERPL-MCNC: 8 NG/ML (ref 26–388)
FRAGMENTS BLD QL SMEAR: ABNORMAL
HCT VFR BLD AUTO: 38.9 % (ref 40–53)
HGB BLD-MCNC: 11.9 G/DL (ref 13.3–17.7)
IMM GRANULOCYTES # BLD: 0 10E3/UL
IMM GRANULOCYTES NFR BLD: 0 %
IRON SATN MFR SERPL: 27 % (ref 15–46)
IRON SERPL-MCNC: 120 UG/DL (ref 35–180)
LDH SERPL L TO P-CCNC: 163 U/L (ref 85–227)
LYMPHOCYTES # BLD AUTO: 2.2 10E3/UL (ref 0.8–5.3)
LYMPHOCYTES NFR BLD AUTO: 33 %
MCH RBC QN AUTO: 18.7 PG (ref 26.5–33)
MCHC RBC AUTO-ENTMCNC: 30.6 G/DL (ref 31.5–36.5)
MCV RBC AUTO: 61 FL (ref 78–100)
MONOCYTES # BLD AUTO: 0.4 10E3/UL (ref 0–1.3)
MONOCYTES NFR BLD AUTO: 7 %
NEUTROPHILS # BLD AUTO: 3.8 10E3/UL (ref 1.6–8.3)
NEUTROPHILS NFR BLD AUTO: 59 %
NRBC # BLD AUTO: 0 10E3/UL
NRBC BLD AUTO-RTO: 0 /100
PLAT MORPH BLD: ABNORMAL
PLATELET # BLD AUTO: 261 10E3/UL (ref 150–450)
RBC # BLD AUTO: 6.36 10E6/UL (ref 4.4–5.9)
RBC MORPH BLD: ABNORMAL
RETICS # AUTO: 0.08 10E6/UL (ref 0.03–0.1)
RETICS/RBC NFR AUTO: 1.3 % (ref 0.5–2)
TIBC SERPL-MCNC: 450 UG/DL (ref 240–430)
WBC # BLD AUTO: 6.5 10E3/UL (ref 4–11)

## 2023-05-31 PROCEDURE — 36415 COLL VENOUS BLD VENIPUNCTURE: CPT | Performed by: INTERNAL MEDICINE

## 2023-05-31 PROCEDURE — 85660 RBC SICKLE CELL TEST: CPT | Performed by: INTERNAL MEDICINE

## 2023-05-31 PROCEDURE — 83021 HEMOGLOBIN CHROMOTOGRAPHY: CPT | Performed by: INTERNAL MEDICINE

## 2023-05-31 PROCEDURE — 99000 SPECIMEN HANDLING OFFICE-LAB: CPT | Performed by: INTERNAL MEDICINE

## 2023-05-31 PROCEDURE — 83550 IRON BINDING TEST: CPT | Performed by: INTERNAL MEDICINE

## 2023-05-31 PROCEDURE — 83020 HEMOGLOBIN ELECTROPHORESIS: CPT | Performed by: INTERNAL MEDICINE

## 2023-05-31 PROCEDURE — 83540 ASSAY OF IRON: CPT | Performed by: INTERNAL MEDICINE

## 2023-05-31 PROCEDURE — 99204 OFFICE O/P NEW MOD 45 MIN: CPT | Performed by: INTERNAL MEDICINE

## 2023-05-31 PROCEDURE — 83010 ASSAY OF HAPTOGLOBIN QUANT: CPT | Performed by: INTERNAL MEDICINE

## 2023-05-31 PROCEDURE — 83615 LACTATE (LD) (LDH) ENZYME: CPT | Performed by: INTERNAL MEDICINE

## 2023-05-31 PROCEDURE — 82728 ASSAY OF FERRITIN: CPT | Performed by: INTERNAL MEDICINE

## 2023-05-31 PROCEDURE — 82247 BILIRUBIN TOTAL: CPT | Performed by: INTERNAL MEDICINE

## 2023-05-31 PROCEDURE — 99207 BLOOD MORPHOLOGY PATHOLOGIST REVIEW: CPT | Performed by: PATHOLOGY

## 2023-05-31 PROCEDURE — 85025 COMPLETE CBC W/AUTO DIFF WBC: CPT | Performed by: INTERNAL MEDICINE

## 2023-05-31 PROCEDURE — 86880 COOMBS TEST DIRECT: CPT | Performed by: INTERNAL MEDICINE

## 2023-05-31 PROCEDURE — 85045 AUTOMATED RETICULOCYTE COUNT: CPT | Performed by: INTERNAL MEDICINE

## 2023-05-31 PROCEDURE — 82248 BILIRUBIN DIRECT: CPT | Performed by: INTERNAL MEDICINE

## 2023-05-31 ASSESSMENT — PAIN SCALES - GENERAL: PAINLEVEL: NO PAIN (0)

## 2023-05-31 NOTE — LETTER
5/31/2023         RE: Farhat Tejada  [address] [address]        Dear Colleague,    Thank you for referring your patient, Farhat Tejada, to the [facility]. Please see a copy of my visit note below.    Hematology initial visit:  Date on this visit: 5/31/2023    Farhat Tejada  is referred by Dr.Jessica Amber Magaña for a hematology consultation. He requires evaluation for anemia/elliptocytosis    Primary Physician: Helena Magaña     History Of Present Illness:  Mr. Tejada is a 62 year old male who presents with anemia/elliptocytosis    He is here with his wife.    Over the last 6 months, since around the end of 2022 he has been feeling more tired than usual admitted to mild dyspnea on exertion.  Prior to that he was fully functional with good energy and had no symptoms.    During this time he has had negative cardiac work-up.  MRI/MRA of the brain was unremarkable.    He was noted to have microcytic anemia.  Therefore he is here for hematology evaluation.    Looking back he has had mild anemia with marked microcytosis at least since 2012.    He mentions that throughout his childhood and adulthood, he has been feeling normal.  He denies any B symptoms.  No nausea vomiting diarrhea constipation.  No infections.  During the last 1 year he was diagnosed with diabetes and hypertension and hyperlipidemia and was started on medications for that.  He has mild neuropathy in his feet.  No new swellings.  He is originally from [place] [place] [facility].      ROS:  A comprehensive ROS was otherwise neg      Past Medical/Surgical History:  Past Medical History:   Diagnosis Date     Diabetes (H)      HLD (hyperlipidemia)      Obesity      Sleep apnea     does not like to use CPAP     Past Surgical History:   Procedure Laterality Date     CHOLECYSTECTOMY  03/23/2022     ENDOSCOPIC RETROGRADE CHOLANGIOPANCREATOGRAM N/A 3/22/2022    Procedure: ENDOSCOPIC RETROGRADE  CHOLANGIOPANCREATOGRAPHY, BILIARY SPHINCTEROTOMY STONE EXTRACTION;  Surgeon: Farhat Rodriguez MD;  Location: [facility] OR     ESOPHAGOSCOPY, GASTROSCOPY, DUODENOSCOPY (EGD), COMBINED N/A 3/22/2022    Procedure: ESOPHAGOGASTRODUODENOSCOPY, WITH FINE NEEDLE ASPIRATION BIOPSY, WITH ENDOSCOPIC ULTRASOUND GUIDANCE;  Surgeon: Farhat Rodriguez MD;  Location: [facility] OR     LAPAROSCOPIC CHOLECYSTECTOMY N/A 3/23/2022    Procedure: CHOLECYSTECTOMY, LAPAROSCOPIC;  Surgeon: Branden Ponce DO;  Location: [facility] OR     VASECTOMY       WISDOM ST GUIDEWIRE       Allergies:  Allergies as of 05/31/2023     (No Known Allergies)     Current Medications:  Current Outpatient Medications   Medication Sig Dispense Refill     alcohol swab prep pads Use to swab area of injection/shelby as directed. Check blood glucose twice daily 100 each 6     aspirin (ASA) 81 MG EC tablet Take 1 tablet (81 mg) by mouth daily 90 tablet 3     atorvastatin (LIPITOR) 40 MG tablet Take 1 tablet (40 mg) by mouth daily (heart protection) 90 tablet 1     blood glucose (NO BRAND SPECIFIED) test strip Use to test blood sugar 1 times daily as needed or as directed. 100 strip 3     fluconazole (DIFLUCAN) 150 MG tablet        lisinopril (ZESTRIL) 20 MG tablet Take 1 tablet (20 mg) by mouth daily 90 tablet 0     meclizine (ANTIVERT) 25 MG tablet Take 1 tablet (25 mg) by mouth 3 times daily as needed for dizziness or nausea 25 tablet 0     metFORMIN (GLUCOPHAGE XR) 500 MG 24 hr tablet Take 2 tablets (1,000 mg) by mouth 2 times daily (with meals) 360 tablet 1     omeprazole (PRILOSEC) 20 MG DR capsule Take 1 capsule (20 mg) by mouth daily as needed (heart burn) 90 capsule 1      Family History:  Family History   Problem Relation Age of Onset     Hypertension Mother      Heart Failure Mother 36     Diabetes Father         Type 1     Hypertension Father      Cerebrovascular Disease Father 88     Thyroid Cancer Father      Thyroid Cancer Brother          "Papillary     Diabetes Paternal Aunt         Type 1     No family history of bleeding or clotting disorder.  No known history of thalassemia.  Brother had thyroid cancer.  2 sons- both are healthy    Social History:  Social History     Socioeconomic History     Marital status:      Spouse name: Not on file     Number of children: Not on file     Years of education: Not on file     Highest education level: Not on file   Occupational History     Not on file   Tobacco Use     Smoking status: Never     Smokeless tobacco: Never   Vaping Use     Vaping status: Never Used   Substance and Sexual Activity     Alcohol use: Yes     Drug use: No     Sexual activity: Yes     Partners: Female   Other Topics Concern     Parent/sibling w/ CABG, MI or angioplasty before 65F 55M? Yes     Comment: mother  at age 36 from heart condition    Social History Narrative     Not on file     Social Determinants of Health     Financial Resource Strain: Not on file   Food Insecurity: Not on file   Transportation Needs: Not on file   Physical Activity: Not on file   Stress: Not on file   Social Connections: Not on file   Intimate Partner Violence: Not on file   Housing Stability: Not on file   no smoking. Occasional etoh. Lives with wife.   He is originally from [place] [place] [facility]      Physical Exam:  /86 (BP Location: Left arm)   Pulse 74   Resp 16   Ht 1.78 m (5' 10.08\")   Wt 90.3 kg (199 lb)   SpO2 100%   BMI 28.49 kg/m     Wt Readings from Last 4 Encounters:   23 90.3 kg (199 lb)   23 88.9 kg (196 lb)   22 92.9 kg (204 lb 12.8 oz)   22 93.4 kg (206 lb)       CONSTITUTIONAL: no acute distress  EYES: PERRLA, no palor or icterus.   ENT/MOUTH: no mouth lesions. Ears normal  CVS: s1s2 no m r g .   RESPIRATORY: clear to auscultation b/l  GI: soft non tender no hepatosplenomegaly  NEURO: AAOX3  Grossly non focal neuro exam  INTEGUMENT: no obvious rashes  LYMPHATIC: no palpable cervical, supraclavicular, " axillary or inguinal LAD  MUSCULOSKELETAL: Unremarkable. No bony tenderness.   EXTREMITIES: no edema  PSYCH: Mentation, mood and affect are normal. Decision making capacity is intact    Laboratory/Imaging Studies      Reviewed  4/20/2023  CBC shows WBC 6.4.  Hemoglobin 12.3.  Platelets 275.  RBC count 6.78.  MCV 61.  Chemistry unremarkable except bilirubin 1.9.  TSH 2.24    4/26/2023- Brain MRI  IMPRESSION:   1.  No acute intracranial finding. No evidence for recent ischemia,  intracranial hemorrhage, or mass.  2.  A few T2 hyperintense foci in the white matter are nonspecific but  most commonly reflect gliosis related to prior ischemic or  inflammatory insult. They have been reported in the setting of  Migraines.    4/26/2023- MRA Brain  IMPRESSION:   1.  Negative MR angiogram of the brain. No evidence for aneurysm,  proximal vessel occlusion, high-grade intracranial stenosis or high  flow vascular lesion.    12/16/2022- Abdominal US  IMPRESSION:   Improved, but persistent hepatic steatosis. Hepatomegaly has resolved.      ASSESSMENT/PLAN:    Microcytic anemia with elevated RBC count and elliptocytes seen on the CBC.  This is a chronic issue.  I would recommend checking peripheral blood smear.  We will check iron studies as well.  We will check hemoglobin electrophoresis.  He could have underlying thalassemia trait considering RBC count is elevated.  If hemoglobin electrophoresis is unremarkable, then we will check for alpha thalassemia.  As bilirubin is mildly elevated, I would also like to check LDH, haptoglobin and RC to see if there is any other evidence of hemolysis.    He could have Heriditary elliptocytosis.  There was mention of elliptocytes seen on previous CBC.  As mentioned above we will check peripheral blood smear.  We can consider checking Hereditary Hemolytic Anemia Panel Sequencing by [employer] ( [id number] )    We also discussed that if he is found to have a hereditary condition, then I would also  recommend genetic counselor evaluation.    We discussed that since his microcytic anemia is going on for a long time, at this time I have very low suspicion that his current symptoms of fatigue are related to this.    We will determine the follow-up with me accordingly.    All questions answered.  He is agreeable and comfortable with the plan.    Carin Huntley MD               Again, thank you for allowing me to participate in the care of your patient.        Sincerely,        Carin Huntley MD

## 2023-05-31 NOTE — PROGRESS NOTES
Hematology initial visit:  Date on this visit: 5/31/2023    Farhat Tejada  is referred by Dr.Jessica Amber Magaña for a hematology consultation. He requires evaluation for anemia/elliptocytosis    Primary Physician: Helena Magaña     History Of Present Illness:  Mr. Tejada is a 62 year old male who presents with anemia/elliptocytosis    He is here with his wife.    Over the last 6 months, since around the end of 2022 he has been feeling more tired than usual admitted to mild dyspnea on exertion.  Prior to that he was fully functional with good energy and had no symptoms.    During this time he has had negative cardiac work-up.  MRI/MRA of the brain was unremarkable.    He was noted to have microcytic anemia.  Therefore he is here for hematology evaluation.    Looking back he has had mild anemia with marked microcytosis at least since 2012.    He mentions that throughout his childhood and adulthood, he has been feeling normal.  He denies any B symptoms.  No nausea vomiting diarrhea constipation.  No infections.  During the last 1 year he was diagnosed with diabetes and hypertension and hyperlipidemia and was started on medications for that.  He has mild neuropathy in his feet.  No new swellings.  He is originally from [facility].      ROS:  A comprehensive ROS was otherwise neg      Past Medical/Surgical History:  Past Medical History:   Diagnosis Date     Diabetes (H)      HLD (hyperlipidemia)      Obesity      Sleep apnea     does not like to use CPAP     Past Surgical History:   Procedure Laterality Date     CHOLECYSTECTOMY  03/23/2022     ENDOSCOPIC RETROGRADE CHOLANGIOPANCREATOGRAM N/A 3/22/2022    Procedure: ENDOSCOPIC RETROGRADE CHOLANGIOPANCREATOGRAPHY, BILIARY SPHINCTEROTOMY STONE EXTRACTION;  Surgeon: Farhat Rodriguez MD;  Location: [facility] [place]     ESOPHAGOSCOPY, GASTROSCOPY, DUODENOSCOPY (EGD), COMBINED N/A 3/22/2022    Procedure: ESOPHAGOGASTRODUODENOSCOPY, WITH FINE NEEDLE ASPIRATION  BIOPSY, WITH ENDOSCOPIC ULTRASOUND GUIDANCE;  Surgeon: Farhat Rodriguez MD;  Location: [facility] [place]     LAPAROSCOPIC CHOLECYSTECTOMY N/A 3/23/2022    Procedure: CHOLECYSTECTOMY, LAPAROSCOPIC;  Surgeon: Branden Ponce DO;  Location: [facility] [place]     VASECTOMY       Formerly [facility]       Allergies:  Allergies as of 05/31/2023     (No Known Allergies)     Current Medications:  Current Outpatient Medications   Medication Sig Dispense Refill     alcohol swab prep pads Use to swab area of injection/shelby as directed. Check blood glucose twice daily 100 each 6     aspirin (ASA) 81 MG EC tablet Take 1 tablet (81 mg) by mouth daily 90 tablet 3     atorvastatin (LIPITOR) 40 MG tablet Take 1 tablet (40 mg) by mouth daily (heart protection) 90 tablet 1     blood glucose (NO BRAND SPECIFIED) test strip Use to test blood sugar 1 times daily as needed or as directed. 100 strip 3     fluconazole (DIFLUCAN) 150 MG tablet        lisinopril (ZESTRIL) 20 MG tablet Take 1 tablet (20 mg) by mouth daily 90 tablet 0     meclizine (ANTIVERT) 25 MG tablet Take 1 tablet (25 mg) by mouth 3 times daily as needed for dizziness or nausea 25 tablet 0     metFORMIN (GLUCOPHAGE XR) 500 MG 24 hr tablet Take 2 tablets (1,000 mg) by mouth 2 times daily (with meals) 360 tablet 1     omeprazole (PRILOSEC) 20 MG DR capsule Take 1 capsule (20 mg) by mouth daily as needed (heart burn) 90 capsule 1      Family History:  Family History   Problem Relation Age of Onset     Hypertension Mother      Heart Failure Mother 36     Diabetes Father         Type 1     Hypertension Father      Cerebrovascular Disease Father 88     Thyroid Cancer Father      Thyroid Cancer Brother         Papillary     Diabetes Paternal Aunt         Type 1     No family history of bleeding or clotting disorder.  No known history of thalassemia.  Brother had thyroid cancer.  2 sons- both are healthy    Social History:  Social History     Socioeconomic History     Marital  "status:      Spouse name: Not on file     Number of children: Not on file     Years of education: Not on file     Highest education level: Not on file   Occupational History     Not on file   Tobacco Use     Smoking status: Never     Smokeless tobacco: Never   Vaping Use     Vaping status: Never Used   Substance and Sexual Activity     Alcohol use: Yes     Drug use: No     Sexual activity: Yes     Partners: Female   Other Topics Concern     Parent/sibling w/ CABG, MI or angioplasty before 65F 55M? Yes     Comment: mother  at age 36 from heart condition    Social History Narrative     Not on file     Social Determinants of Health     Financial Resource Strain: Not on file   Food Insecurity: Not on file   Transportation Needs: Not on file   Physical Activity: Not on file   Stress: Not on file   Social Connections: Not on file   Intimate Partner Violence: Not on file   Housing Stability: Not on file   no smoking. Occasional etoh. Lives with wife.   He is originally from [facility]      Physical Exam:  /86 (BP Location: Left arm)   Pulse 74   Resp 16   Ht 1.78 m (5' 10.08\")   Wt 90.3 kg (199 lb)   SpO2 100%   BMI 28.49 kg/m     Wt Readings from Last 4 Encounters:   23 90.3 kg (199 lb)   23 88.9 kg (196 lb)   22 92.9 kg (204 lb 12.8 oz)   22 93.4 kg (206 lb)       CONSTITUTIONAL: no acute distress  EYES: PERRLA, no palor or icterus.   ENT/MOUTH: no mouth lesions. Ears normal  CVS: s1s2 no m r g .   RESPIRATORY: clear to auscultation b/l  GI: soft non tender no hepatosplenomegaly  NEURO: AAOX3  Grossly non focal neuro exam  INTEGUMENT: no obvious rashes  LYMPHATIC: no palpable cervical, supraclavicular, axillary or inguinal LAD  MUSCULOSKELETAL: Unremarkable. No bony tenderness.   EXTREMITIES: no edema  PSYCH: Mentation, mood and affect are normal. Decision making capacity is intact    Laboratory/Imaging Studies      Reviewed  2023  CBC shows WBC 6.4.  Hemoglobin " 12.3.  Platelets 275.  RBC count 6.78.  MCV 61.  Chemistry unremarkable except bilirubin 1.9.  TSH 2.24    4/26/2023- Brain MRI  IMPRESSION:   1.  No acute intracranial finding. No evidence for recent ischemia,  intracranial hemorrhage, or mass.  2.  A few T2 hyperintense foci in the white matter are nonspecific but  most commonly reflect gliosis related to prior ischemic or  inflammatory insult. They have been reported in the setting of  Migraines.    4/26/2023- MRA Brain  IMPRESSION:   1.  Negative MR angiogram of the brain. No evidence for aneurysm,  proximal vessel occlusion, high-grade intracranial stenosis or high  flow vascular lesion.    12/16/2022- Abdominal US  IMPRESSION:   Improved, but persistent hepatic steatosis. Hepatomegaly has resolved.      ASSESSMENT/PLAN:    Microcytic anemia with elevated RBC count and elliptocytes seen on the CBC.  This is a chronic issue.  I would recommend checking peripheral blood smear.  We will check iron studies as well.  We will check hemoglobin electrophoresis.  He could have underlying thalassemia trait considering RBC count is elevated.  If hemoglobin electrophoresis is unremarkable, then we will check for alpha thalassemia.  As bilirubin is mildly elevated, I would also like to check LDH, haptoglobin and RC to see if there is any other evidence of hemolysis.    He could have Heriditary elliptocytosis.  There was mention of elliptocytes seen on previous CBC.  As mentioned above we will check peripheral blood smear.  We can consider checking Hereditary Hemolytic Anemia Panel Sequencing by [employer] ( [id number] )    We also discussed that if he is found to have a hereditary condition, then I would also recommend genetic counselor evaluation.    We discussed that since his microcytic anemia is going on for a long time, at this time I have very low suspicion that his current symptoms of fatigue are related to this.    We will determine the follow-up with me accordingly.    All  questions answered.  He is agreeable and comfortable with the plan.    Carin Huntley MD

## 2023-05-31 NOTE — NURSING NOTE
"Oncology Rooming Note    May 31, 2023 2:06 PM   Farhat Tejada is a 62 year old male who presents for:    Chief Complaint   Patient presents with     Oncology Clinic Visit     New Patient     Initial Vitals: /86 (BP Location: Left arm)   Pulse 74   Resp 16   Ht 1.78 m (5' 10.08\")   Wt 90.3 kg (199 lb)   SpO2 100%   BMI 28.49 kg/m   Estimated body mass index is 28.49 kg/m  as calculated from the following:    Height as of this encounter: 1.78 m (5' 10.08\").    Weight as of this encounter: 90.3 kg (199 lb). Body surface area is 2.11 meters squared.  No Pain (0) Comment: Data Unavailable   No LMP for male patient.  Allergies reviewed: Yes  Medications reviewed: Yes    Medications: Medication refills not needed today.  Pharmacy name entered into [employer]: [id number] - [place] - [address] AT [address] OF [facility] [address]    Clinical concerns: New patient       Hilda Leon LPN            "

## 2023-06-01 LAB
HAPTOGLOB SERPL-MCNC: 128 MG/DL (ref 32–197)
PATH REPORT.COMMENTS IMP SPEC: NORMAL
PATH REPORT.COMMENTS IMP SPEC: NORMAL
PATH REPORT.FINAL DX SPEC: NORMAL
PATH REPORT.MICROSCOPIC SPEC OTHER STN: NORMAL
PATH REPORT.MICROSCOPIC SPEC OTHER STN: NORMAL
PATH REPORT.RELEVANT HX SPEC: NORMAL

## 2023-06-03 LAB
HGB A1 MFR BLD: 93.8 %
HGB A2 MFR BLD: 5.7 %
HGB C MFR BLD: 0 %
HGB E MFR BLD: 0 %
HGB F MFR BLD: 0.5 %
HGB FRACT BLD ELPH-IMP: ABNORMAL
HGB OTHER MFR BLD: 0 %
HGB S BLD QL SOLY: ABNORMAL
HGB S MFR BLD: 0 %
PATH INTERP BLD-IMP: ABNORMAL

## 2023-06-07 ENCOUNTER — TELEPHONE (OUTPATIENT)
Dept: ONCOLOGY | Facility: CLINIC | Age: 63
End: 2023-06-07
Payer: COMMERCIAL

## 2023-06-07 DIAGNOSIS — D64.9 ANEMIA, UNSPECIFIED TYPE: Primary | ICD-10-CM

## 2023-06-07 NOTE — TELEPHONE ENCOUNTER
Labs are consistent with iron deficiency.  He also has beta thalassemia trait.    We will start him on oral iron and repeat labs in 3 months.  I will see him after that.    Carin Huntley MD

## 2023-06-19 ENCOUNTER — MYC MEDICAL ADVICE (OUTPATIENT)
Dept: FAMILY MEDICINE | Facility: CLINIC | Age: 63
End: 2023-06-19
Payer: COMMERCIAL

## 2023-07-06 DIAGNOSIS — E11.65 TYPE 2 DIABETES MELLITUS WITH HYPERGLYCEMIA, WITHOUT LONG-TERM CURRENT USE OF INSULIN (H): ICD-10-CM

## 2023-07-06 DIAGNOSIS — I10 HYPERTENSION GOAL BP (BLOOD PRESSURE) < 140/90: ICD-10-CM

## 2023-07-06 RX ORDER — LISINOPRIL 20 MG/1
TABLET ORAL
Qty: 90 TABLET | Refills: 0 | Status: SHIPPED | OUTPATIENT
Start: 2023-07-06 | End: 2023-10-02

## 2023-09-20 ENCOUNTER — ONCOLOGY VISIT (OUTPATIENT)
Dept: ONCOLOGY | Facility: CLINIC | Age: 63
End: 2023-09-20
Payer: COMMERCIAL

## 2023-09-20 ENCOUNTER — LAB (OUTPATIENT)
Dept: LAB | Facility: CLINIC | Age: 63
End: 2023-09-20
Payer: COMMERCIAL

## 2023-09-20 VITALS
HEART RATE: 78 BPM | BODY MASS INDEX: 28.77 KG/M2 | OXYGEN SATURATION: 100 % | WEIGHT: 201 LBS | HEIGHT: 70 IN | DIASTOLIC BLOOD PRESSURE: 70 MMHG | SYSTOLIC BLOOD PRESSURE: 126 MMHG

## 2023-09-20 DIAGNOSIS — D50.9 IRON DEFICIENCY ANEMIA, UNSPECIFIED IRON DEFICIENCY ANEMIA TYPE: Primary | ICD-10-CM

## 2023-09-20 DIAGNOSIS — D56.3 BETA THALASSEMIA TRAIT: ICD-10-CM

## 2023-09-20 DIAGNOSIS — D64.9 ANEMIA, UNSPECIFIED TYPE: ICD-10-CM

## 2023-09-20 LAB
BASOPHILS # BLD AUTO: 0 10E3/UL (ref 0–0.2)
BASOPHILS NFR BLD AUTO: 0 %
BILIRUB DIRECT SERPL-MCNC: 0.43 MG/DL (ref 0–0.3)
BILIRUB SERPL-MCNC: 1.7 MG/DL
BURR CELLS BLD QL SMEAR: SLIGHT
DACRYOCYTES BLD QL SMEAR: SLIGHT
ELLIPTOCYTES BLD QL SMEAR: ABNORMAL
EOSINOPHIL # BLD AUTO: 0.1 10E3/UL (ref 0–0.7)
EOSINOPHIL NFR BLD AUTO: 2 %
ERYTHROCYTE [DISTWIDTH] IN BLOOD BY AUTOMATED COUNT: 17.2 % (ref 10–15)
FERRITIN SERPL-MCNC: 82 NG/ML (ref 31–409)
FRAGMENTS BLD QL SMEAR: SLIGHT
HCT VFR BLD AUTO: 42.2 % (ref 40–53)
HGB BLD-MCNC: 13 G/DL (ref 13.3–17.7)
HOLD SPECIMEN: NORMAL
IMM GRANULOCYTES # BLD: 0 10E3/UL
IMM GRANULOCYTES NFR BLD: 0 %
IRON BINDING CAPACITY (ROCHE): 382 UG/DL (ref 240–430)
IRON SATN MFR SERPL: 21 % (ref 15–46)
IRON SERPL-MCNC: 80 UG/DL (ref 61–157)
LYMPHOCYTES # BLD AUTO: 2 10E3/UL (ref 0.8–5.3)
LYMPHOCYTES NFR BLD AUTO: 28 %
MCH RBC QN AUTO: 19.3 PG (ref 26.5–33)
MCHC RBC AUTO-ENTMCNC: 30.8 G/DL (ref 31.5–36.5)
MCV RBC AUTO: 63 FL (ref 78–100)
MONOCYTES # BLD AUTO: 0.5 10E3/UL (ref 0–1.3)
MONOCYTES NFR BLD AUTO: 7 %
NEUTROPHILS # BLD AUTO: 4.3 10E3/UL (ref 1.6–8.3)
NEUTROPHILS NFR BLD AUTO: 63 %
NRBC # BLD AUTO: 0 10E3/UL
NRBC BLD AUTO-RTO: 0 /100
PLAT MORPH BLD: ABNORMAL
PLATELET # BLD AUTO: 259 10E3/UL (ref 150–450)
RBC # BLD AUTO: 6.74 10E6/UL (ref 4.4–5.9)
RBC MORPH BLD: ABNORMAL
WBC # BLD AUTO: 6.9 10E3/UL (ref 4–11)

## 2023-09-20 PROCEDURE — 82728 ASSAY OF FERRITIN: CPT

## 2023-09-20 PROCEDURE — 99214 OFFICE O/P EST MOD 30 MIN: CPT | Performed by: INTERNAL MEDICINE

## 2023-09-20 PROCEDURE — 82247 BILIRUBIN TOTAL: CPT

## 2023-09-20 PROCEDURE — 85025 COMPLETE CBC W/AUTO DIFF WBC: CPT

## 2023-09-20 PROCEDURE — 83540 ASSAY OF IRON: CPT

## 2023-09-20 PROCEDURE — 82248 BILIRUBIN DIRECT: CPT

## 2023-09-20 PROCEDURE — 83550 IRON BINDING TEST: CPT

## 2023-09-20 PROCEDURE — 36415 COLL VENOUS BLD VENIPUNCTURE: CPT

## 2023-09-20 ASSESSMENT — PAIN SCALES - GENERAL: PAINLEVEL: NO PAIN (0)

## 2023-09-20 NOTE — PROGRESS NOTES
Hematology follow-up visit:  Date on this visit: 9/20/23     Diagnosis.  Iron deficiency anemia.  Beta thalassemia trait.    Primary Physician: Helena Magaña     History Of Present Illness:  Mr. Tejada is a 63 year old  male who presents with anemia/elliptocytosis    I initially saw him on 5/31/2023.  Please see my previous note for details.  I have copied and updated from prior notes.      Since around the end of 2022 he has been feeling more tired than usual admitted to mild dyspnea on exertion.  Prior to that he was fully functional with good energy and had no symptoms.    During this time he has had negative cardiac work-up.  MRI/MRA of the brain was unremarkable.    He was noted to have microcytic anemia.  Therefore he is here for hematology evaluation.    Looking back he has had mild anemia with marked microcytosis at least since 2012.    He mentions that throughout his childhood and adulthood, he has been feeling normal.  He denies any B symptoms.  No nausea vomiting diarrhea constipation.  No infections.  During the last 1 year he was diagnosed with diabetes and hypertension and hyperlipidemia and was started on medications for that.  He has mild neuropathy in his feet.  No new swellings.  He is originally from [facility].    We did further testing and he was noted to have beta thalassemia trait and iron deficiency anemia.  I recommended starting oral iron in June 2023.    Interval history  He comes in today accompanied by his wife.  He has been taking oral iron once daily since June 2023 and he is tolerating it well.  He thinks his energy is very good now.  He feels normal.  No abnormal bleeding.  No infections or shortness of breath.    ROS:  A ROS was otherwise neg    I reviewed other history in epic as below.      Past Medical/Surgical History:  Past Medical History:   Diagnosis Date    Diabetes (H)     HLD (hyperlipidemia)     Obesity     Sleep apnea     does not like to use CPAP     Past  Surgical History:   Procedure Laterality Date    CHOLECYSTECTOMY  03/23/2022    ENDOSCOPIC RETROGRADE CHOLANGIOPANCREATOGRAM N/A 3/22/2022    Procedure: ENDOSCOPIC RETROGRADE CHOLANGIOPANCREATOGRAPHY, BILIARY SPHINCTEROTOMY STONE EXTRACTION;  Surgeon: Farhat Rodriguez MD;  [place] [facility] [place]    ESOPHAGOSCOPY, GASTROSCOPY, DUODENOSCOPY (EGD), COMBINED N/A 3/22/2022    Procedure: ESOPHAGOGASTRODUODENOSCOPY, WITH FINE NEEDLE ASPIRATION BIOPSY, WITH ENDOSCOPIC ULTRASOUND GUIDANCE;  Surgeon: Farhat Rodriguez MD;  [place] [facility] [place]    LAPAROSCOPIC CHOLECYSTECTOMY N/A 3/23/2022    Procedure: CHOLECYSTECTOMY, LAPAROSCOPIC;  Surgeon: Branden Ponce DO;  [place] [facility] [place]    VASECTOMY      WISDOM ST GUIDEWIRE       Allergies:  Allergies as of 09/20/2023    (No Known Allergies)     Current Medications:  Current Outpatient Medications   Medication Sig Dispense Refill    alcohol swab prep pads Use to swab area of injection/shelby as directed. Check blood glucose twice daily 100 each 6    aspirin (ASA) 81 MG EC tablet Take 1 tablet (81 mg) by mouth daily 90 tablet 3    atorvastatin (LIPITOR) 40 MG tablet Take 1 tablet (40 mg) by mouth daily (heart protection) 90 tablet 1    blood glucose (NO BRAND SPECIFIED) test strip Use to test blood sugar 1 times daily as needed or as directed. 100 strip 3    fluconazole (DIFLUCAN) 150 MG tablet       lisinopril (ZESTRIL) 20 MG tablet TAKE 1 TABLET(20 MG) BY MOUTH DAILY 90 tablet 0    meclizine (ANTIVERT) 25 MG tablet Take 1 tablet (25 mg) by mouth 3 times daily as needed for dizziness or nausea 25 tablet 0    metFORMIN (GLUCOPHAGE XR) 500 MG 24 hr tablet Take 2 tablets (1,000 mg) by mouth 2 times daily (with meals) 360 tablet 1    omeprazole (PRILOSEC) 20 MG DR capsule Take 1 capsule (20 mg) by mouth daily as needed (heart burn) 90 capsule 1      Family History:  Family History   Problem Relation Age of Onset    Hypertension Mother     Heart Failure Mother 36     "Diabetes Father         Type 1    Hypertension Father     Cerebrovascular Disease Father 88    Thyroid Cancer Father     Thyroid Cancer Brother         Papillary    Diabetes Paternal Aunt         Type 1     No family history of bleeding or clotting disorder.  No known history of thalassemia.  Brother had thyroid cancer.  2 sons- both are healthy    Social History:  Social History     Socioeconomic History    Marital status:      Spouse name: Not on file    Number of children: Not on file    Years of education: Not on file    Highest education level: Not on file   Occupational History    Not on file   Tobacco Use    Smoking status: Never    Smokeless tobacco: Never   Vaping Use    Vaping Use: Never used   Substance and Sexual Activity    Alcohol use: Yes    Drug use: No    Sexual activity: Yes     Partners: Female   Other Topics Concern    Parent/sibling w/ CABG, MI or angioplasty before 65F 55M? Yes     Comment: mother  at age 36 from heart condition    Social History Narrative    Not on file     Social Determinants of Health     Financial Resource Strain: Not on file   Food Insecurity: Not on file   Transportation Needs: Not on file   Physical Activity: Not on file   Stress: Not on file   Social Connections: Not on file   Interpersonal Safety: Not on file   Housing Stability: Not on file   no smoking. Occasional etoh. Lives with wife.   He is originally from [facility]      Physical Exam:  /70 (BP Location: Left arm, Patient Position: Chair, Cuff Size: Adult Regular)   Pulse 78   Ht 1.78 m (5' 10.08\")   Wt 91.2 kg (201 lb)   SpO2 100%   BMI 28.77 kg/m     Wt Readings from Last 4 Encounters:   23 91.2 kg (201 lb)   23 90.3 kg (199 lb)   23 88.9 kg (196 lb)   22 92.9 kg (204 lb 12.8 oz)       Constitutional.  Looks well and in no apparent distress.   Eyes.  Without eye redness or apparent jaundice.   Respiratory.  Non labored breathing. Speaking in full sentences. "    Skin.  No concerning skin rashes on the skin visualized.   Neurological.  Is alert and oriented.  Psychiatric.  Mood and affect seem appropriate.      The rest of a comprehensive physical examination was not performed today.      Laboratory/Imaging Studies      Reviewed  9/20/2023    Ferritin 82.  Iron 80.  TIBC 382.  Iron saturation index 21%.  Total bilirubin 1.7.  CBC shows WBC 6.9.  Hemoglobin 13.  Platelets count is not available.  MCV 63.  RBC count 6.74.    5/31/2023  Hemoglobin electrophoresis showed hemoglobin A 93.8%.  Hemoglobin A 2 5.7%.    Ferritin 8.  Iron 120.  TIBC 450.  Iron saturation index 27.      4/20/2023  CBC shows WBC 6.4.  Hemoglobin 12.3.  Platelets 275.  RBC count 6.78.  MCV 61.  Chemistry unremarkable except bilirubin 1.9.  TSH 2.24    4/26/2023- Brain MRI  IMPRESSION:   1.  No acute intracranial finding. No evidence for recent ischemia,  intracranial hemorrhage, or mass.  2.  A few T2 hyperintense foci in the white matter are nonspecific but  most commonly reflect gliosis related to prior ischemic or  inflammatory insult. They have been reported in the setting of  Migraines.    4/26/2023- MRA Brain  IMPRESSION:   1.  Negative MR angiogram of the brain. No evidence for aneurysm,  proximal vessel occlusion, high-grade intracranial stenosis or high  flow vascular lesion.    12/16/2022- Abdominal US  IMPRESSION:   Improved, but persistent hepatic steatosis. Hepatomegaly has resolved.      ASSESSMENT/PLAN:    Iron deficiency anemia.  With the start of oral iron, iron studies are now normal.  His anemia is now only mild.    This could be attributed to beta thalassemia trait.    I would recommend a GI work-up for iron deficiency anemia.  Continue oral iron in the meantime and monitor iron studies/CBC serially.    Beat thalassemia minor/trait.  Beta thalassemia minor/trait is a carrier condition in which an individual is heterozygous for a beta+ or beta0 thalassemia mutation. Beta thalassemia  minor is often an asymptomatic carrier state. Individuals with beta thalassemia minor may have mild anemia; most are asymptomatic but exhibit marked microcytosis that can be mistaken for iron deficiency. This is the case for him.  No specific treatment is recommended for this.  I would recommend genetic counselor evaluation because this is a Heriditary condition.    Going forward he will follow with PCP and see me on an as-needed basis.    All questions answered.  He is agreeable and comfortable with the plan.    Carin Huntley MD

## 2023-09-20 NOTE — LETTER
9/20/2023         RE: Farhat Tejada  [address] [facility] [id number]        Dear Colleague,    Thank you for referring your patient, Farhat Tejada, to the [facility]. Please see a copy of my visit note below.    Hematology follow-up visit:  Date on this visit: 9/20/23     Diagnosis.  Iron deficiency anemia.  Beta thalassemia trait.    Primary Physician: Helena Magaña     History Of Present Illness:  Mr. Tejada is a 63 year old  male who presents with anemia/elliptocytosis    I initially saw him on 5/31/2023.  Please see my previous note for details.  I have copied and updated from prior notes.      Since around the end of 2022 he has been feeling more tired than usual admitted to mild dyspnea on exertion.  Prior to that he was fully functional with good energy and had no symptoms.    During this time he has had negative cardiac work-up.  MRI/MRA of the brain was unremarkable.    He was noted to have microcytic anemia.  Therefore he is here for hematology evaluation.    Looking back he has had mild anemia with marked microcytosis at least since 2012.    He mentions that throughout his childhood and adulthood, he has been feeling normal.  He denies any B symptoms.  No nausea vomiting diarrhea constipation.  No infections.  During the last 1 year he was diagnosed with diabetes and hypertension and hyperlipidemia and was started on medications for that.  He has mild neuropathy in his feet.  No new swellings.  He is originally from [facility].    We did further testing and he was noted to have beta thalassemia trait and iron deficiency anemia.  I recommended starting oral iron in June 2023.    Interval history  He comes in today accompanied by his wife.  He has been taking oral iron once daily since June 2023 and he is tolerating it well.  He thinks his energy is very good now.  He feels normal.  No abnormal bleeding.  No infections or shortness of  breath.    ROS:  A ROS was otherwise neg    I reviewed other history in epic as below.      Past Medical/Surgical History:  Past Medical History:   Diagnosis Date     Diabetes (H)      HLD (hyperlipidemia)      Obesity      Sleep apnea     does not like to use CPAP     Past Surgical History:   Procedure Laterality Date     CHOLECYSTECTOMY  03/23/2022     ENDOSCOPIC RETROGRADE CHOLANGIOPANCREATOGRAM N/A 3/22/2022    Procedure: ENDOSCOPIC RETROGRADE CHOLANGIOPANCREATOGRAPHY, BILIARY SPHINCTEROTOMY STONE EXTRACTION;  Surgeon: Farhat Rodriguez MD;  Location: [facility] OR     ESOPHAGOSCOPY, GASTROSCOPY, DUODENOSCOPY (EGD), COMBINED N/A 3/22/2022    Procedure: ESOPHAGOGASTRODUODENOSCOPY, WITH FINE NEEDLE ASPIRATION BIOPSY, WITH ENDOSCOPIC ULTRASOUND GUIDANCE;  Surgeon: Farhat Rodriguez MD;  Location: [facility] OR     LAPAROSCOPIC CHOLECYSTECTOMY N/A 3/23/2022    Procedure: CHOLECYSTECTOMY, LAPAROSCOPIC;  Surgeon: Branden Ponce DO;  Location: [facility] OR     VASECTOMY       WISDOM ST GUIDEWIRE       Allergies:  Allergies as of 09/20/2023     (No Known Allergies)     Current Medications:  Current Outpatient Medications   Medication Sig Dispense Refill     alcohol swab prep pads Use to swab area of injection/shelby as directed. Check blood glucose twice daily 100 each 6     aspirin (ASA) 81 MG EC tablet Take 1 tablet (81 mg) by mouth daily 90 tablet 3     atorvastatin (LIPITOR) 40 MG tablet Take 1 tablet (40 mg) by mouth daily (heart protection) 90 tablet 1     blood glucose (NO BRAND SPECIFIED) test strip Use to test blood sugar 1 times daily as needed or as directed. 100 strip 3     fluconazole (DIFLUCAN) 150 MG tablet        lisinopril (ZESTRIL) 20 MG tablet TAKE 1 TABLET(20 MG) BY MOUTH DAILY 90 tablet 0     meclizine (ANTIVERT) 25 MG tablet Take 1 tablet (25 mg) by mouth 3 times daily as needed for dizziness or nausea 25 tablet 0     metFORMIN (GLUCOPHAGE XR) 500 MG 24 hr tablet Take 2 tablets (1,000 mg)  "by mouth 2 times daily (with meals) 360 tablet 1     omeprazole (PRILOSEC) 20 MG DR capsule Take 1 capsule (20 mg) by mouth daily as needed (heart burn) 90 capsule 1      Family History:  Family History   Problem Relation Age of Onset     Hypertension Mother      Heart Failure Mother 36     Diabetes Father         Type 1     Hypertension Father      Cerebrovascular Disease Father 88     Thyroid Cancer Father      Thyroid Cancer Brother         Papillary     Diabetes Paternal Aunt         Type 1     No family history of bleeding or clotting disorder.  No known history of thalassemia.  Brother had thyroid cancer.  2 sons- both are healthy    Social History:  Social History     Socioeconomic History     Marital status:      Spouse name: Not on file     Number of children: Not on file     Years of education: Not on file     Highest education level: Not on file   Occupational History     Not on file   Tobacco Use     Smoking status: Never     Smokeless tobacco: Never   Vaping Use     Vaping Use: Never used   Substance and Sexual Activity     Alcohol use: Yes     Drug use: No     Sexual activity: Yes     Partners: Female   Other Topics Concern     Parent/sibling w/ CABG, MI or angioplasty before 65F 55M? Yes     Comment: mother  at age 36 from heart condition    Social History Narrative     Not on file     Social Determinants of Health     Financial Resource Strain: Not on file   Food Insecurity: Not on file   Transportation Needs: Not on file   Physical Activity: Not on file   Stress: Not on file   Social Connections: Not on file   Interpersonal Safety: Not on file   Housing Stability: Not on file   no smoking. Occasional etoh. Lives with wife.   He is originally from [facility]      Physical Exam:  /70 (BP Location: Left arm, Patient Position: Chair, Cuff Size: Adult Regular)   Pulse 78   Ht 1.78 m (5' 10.08\")   Wt 91.2 kg (201 lb)   SpO2 100%   BMI 28.77 kg/m     Wt Readings from Last 4 " Encounters:   09/20/23 91.2 kg (201 lb)   05/31/23 90.3 kg (199 lb)   04/17/23 88.9 kg (196 lb)   12/12/22 92.9 kg (204 lb 12.8 oz)       Constitutional.  Looks well and in no apparent distress.   Eyes.  Without eye redness or apparent jaundice.   Respiratory.  Non labored breathing. Speaking in full sentences.    Skin.  No concerning skin rashes on the skin visualized.   Neurological.  Is alert and oriented.  Psychiatric.  Mood and affect seem appropriate.      The rest of a comprehensive physical examination was not performed today.      Laboratory/Imaging Studies      Reviewed  9/20/2023    Ferritin 82.  Iron 80.  TIBC 382.  Iron saturation index 21%.  Total bilirubin 1.7.  CBC shows WBC 6.9.  Hemoglobin 13.  Platelets count is not available.  MCV 63.  RBC count 6.74.    5/31/2023  Hemoglobin electrophoresis showed hemoglobin A 93.8%.  Hemoglobin A 2 5.7%.    Ferritin 8.  Iron 120.  TIBC 450.  Iron saturation index 27.      4/20/2023  CBC shows WBC 6.4.  Hemoglobin 12.3.  Platelets 275.  RBC count 6.78.  MCV 61.  Chemistry unremarkable except bilirubin 1.9.  TSH 2.24    4/26/2023- Brain MRI  IMPRESSION:   1.  No acute intracranial finding. No evidence for recent ischemia,  intracranial hemorrhage, or mass.  2.  A few T2 hyperintense foci in the white matter are nonspecific but  most commonly reflect gliosis related to prior ischemic or  inflammatory insult. They have been reported in the setting of  Migraines.    4/26/2023- MRA Brain  IMPRESSION:   1.  Negative MR angiogram of the brain. No evidence for aneurysm,  proximal vessel occlusion, high-grade intracranial stenosis or high  flow vascular lesion.    12/16/2022- Abdominal US  IMPRESSION:   Improved, but persistent hepatic steatosis. Hepatomegaly has resolved.      ASSESSMENT/PLAN:    Iron deficiency anemia.  With the start of oral iron, iron studies are now normal.  His anemia is now only mild.    This could be attributed to beta thalassemia trait.    I  would recommend a GI work-up for iron deficiency anemia.  Continue oral iron in the meantime and monitor iron studies/CBC serially.    Beat thalassemia minor/trait.  Beta thalassemia minor/trait is a carrier condition in which an individual is heterozygous for a beta+ or beta0 thalassemia mutation. Beta thalassemia minor is often an asymptomatic carrier state. Individuals with beta thalassemia minor may have mild anemia; most are asymptomatic but exhibit marked microcytosis that can be mistaken for iron deficiency. This is the case for him.  No specific treatment is recommended for this.  I would recommend genetic counselor evaluation because this is a Heriditary condition.    Going forward he will follow with PCP and see me on an as-needed basis.    All questions answered.  He is agreeable and comfortable with the plan.    Carin Huntley MD           Again, thank you for allowing me to participate in the care of your patient.        Sincerely,        Carin Huntley MD

## 2023-09-20 NOTE — NURSING NOTE
"Oncology Rooming Note    September 20, 2023 10:46 AM   Farhat Tejada is a 63 year old male who presents for:    Chief Complaint   Patient presents with    Oncology Clinic Visit     Follow up     Initial Vitals: /70 (BP Location: Left arm, Patient Position: Chair, Cuff Size: Adult Regular)   Pulse 78   Ht 1.78 m (5' 10.08\")   Wt 91.2 kg (201 lb)   SpO2 100%   BMI 28.77 kg/m   Estimated body mass index is 28.77 kg/m  as calculated from the following:    Height as of this encounter: 1.78 m (5' 10.08\").    Weight as of this encounter: 91.2 kg (201 lb). Body surface area is 2.12 meters squared.  No Pain (0) Comment: Data Unavailable   No LMP for male patient.  Allergies reviewed: Yes  Medications reviewed: Yes    Medications: Medication refills not needed today.  Pharm[facility] entered into [employer]: [id number] - [place], [place] - [phone] AT [address] OF [facility] [address]    Clinical concerns: NO  Dr. Huntley was NOT notified.      Marsha Fuentes CMA              "

## 2023-09-23 ENCOUNTER — HEALTH MAINTENANCE LETTER (OUTPATIENT)
Age: 63
End: 2023-09-23

## 2023-09-26 ENCOUNTER — TELEPHONE (OUTPATIENT)
Dept: CONSULT | Facility: CLINIC | Age: 63
End: 2023-09-26
Payer: COMMERCIAL

## 2023-09-26 NOTE — TELEPHONE ENCOUNTER
Spoke with patient regarding scheduling appointment with a Genetic Counselor. Patient declines to schedule appointment at this time, but will call back if/when he decides to proceed with scheduling. No further questions at this time.

## 2023-09-30 DIAGNOSIS — I10 HYPERTENSION GOAL BP (BLOOD PRESSURE) < 140/90: ICD-10-CM

## 2023-09-30 DIAGNOSIS — E11.65 TYPE 2 DIABETES MELLITUS WITH HYPERGLYCEMIA, WITHOUT LONG-TERM CURRENT USE OF INSULIN (H): ICD-10-CM

## 2023-10-02 RX ORDER — LISINOPRIL 20 MG/1
TABLET ORAL
Qty: 90 TABLET | Refills: 0 | Status: SHIPPED | OUTPATIENT
Start: 2023-10-02 | End: 2023-12-13

## 2023-10-18 ENCOUNTER — OFFICE VISIT (OUTPATIENT)
Dept: GASTROENTEROLOGY | Facility: CLINIC | Age: 63
End: 2023-10-18
Attending: INTERNAL MEDICINE
Payer: COMMERCIAL

## 2023-10-18 VITALS
OXYGEN SATURATION: 99 % | WEIGHT: 201.5 LBS | SYSTOLIC BLOOD PRESSURE: 124 MMHG | DIASTOLIC BLOOD PRESSURE: 85 MMHG | BODY MASS INDEX: 28.85 KG/M2 | HEART RATE: 79 BPM | HEIGHT: 70 IN

## 2023-10-18 DIAGNOSIS — D50.9 IRON DEFICIENCY ANEMIA, UNSPECIFIED IRON DEFICIENCY ANEMIA TYPE: ICD-10-CM

## 2023-10-18 PROCEDURE — 99214 OFFICE O/P EST MOD 30 MIN: CPT | Performed by: PHYSICIAN ASSISTANT

## 2023-10-18 ASSESSMENT — PAIN SCALES - GENERAL: PAINLEVEL: NO PAIN (0)

## 2023-10-18 NOTE — PROGRESS NOTES
GI CLINIC VISIT    CC/REFERRING MD:  Carin Huntley  REASON FOR CONSULTATION: MYA    ASSESSMENT/PLAN:  62 y/o M presents for evaluation of iron deficiency anemia.     #Iron Deficiency Anemia  He has been following with hematology since may 2023 or evaluation of anemia - he was found to have iron deficiency anemia and has been supplementing with oral iron. He was referred to GI for further evaluation of MYA. He has previously had a colonoscopy in 2021, which showed polyps and and ERCP in 2022 which showed a grossly normal esophagus. A repeat colonoscopy and EGD is warranted at this time for further evaluation of his MYA, if these are unrevealing would pursue VCE. He does have a hx of reflux and has been taking omeprazole 20mg daily for about 6 months, he will continue this - has no reflux symptoms currently.  --schedule EGD and colonoscopy.  --continue oral iron supplementation.  --avoid NSAIDS  --future considerations: If EGD and C-scope are unrevealing, would obtain a VCE.      RTC 3 months    Thank you for this consultation.  It was a pleasure to participate in the care of this patient; please contact us with any further questions.       31 minutes spent on the date of the encounter doing chart review, review of outside records, review of test results, patient visit, and documentation, and discussion with family.    This note was created with voice recognition software, and while reviewed for accuracy, typos may remain.    Sulaiman Boss PA-C  Division of Gastroenterology, Hepatology and Nutrition  [facility]  62 y/o M presents for evaluation of iron deficiency anemia.     Patient was referred by oncology for evaluation of MYA- since the  end of 2022 he has been feeling more tired than usual admitted to mild dyspnea on exertion.  Prior to that he was fully functional with good energy and had no symptoms.  During this time he has had negative cardiac work-up.   MRI/MRA of the brain was unremarkable. He has had mild anemia with marked microcytosis since 2012. He has been taking oral iron since June 2023, and has been feeling better from an energy standpoint, he has been tolerating this well. Most recent Hgb on 9/20/23 was 13 - iron studies were normal as well.    Denies abdominal pain. He does have a hx of acid reflux, which he describes as a burning pain in his chest, generally occurs after eating -- he no longer has symptoms, as he takes omeprazole 20mg as needed. He will generally take this daily over the last 6 months, and was recommended to take this daily by his PCP as he had a flare up of reflux on a cruise. He had an EGD prior to 2002, cannot recall anything alarming. Denies nausea, vomiting, or hematemesis. He does take 81mg aspirin daily.    In regards to his bowel movements, he generally has a bowel movement daily, generally they are formed, but can have a looser stool once every couple of weeks. Patient is on metformin. Denies any BRBPR or black/tarry stools.      Denies NSAIDs or Tylenol. Denies use of OTC herbal supplements/weight loss products.      He drinks 1-2 liquor drinks a week.  Denies tobacco products. No recreational drug use.     No family history of GI related malignancy (esophageal, gastric, pancreatic, liver or colon) or family history of IBD/celiac disease.     ROS:    No fevers or chills  No weight loss  No shortness of breath or wheezing  No chest pain or pressure  No odynophagia or dysphagia  No BRBPR, hematochezia, melena  No anxiety or depression    PROBLEM LIST  Patient Active Problem List    Diagnosis Date Noted    Hypertension goal BP (blood pressure) < 140/90 03/20/2023     Priority: Medium    Fatty liver disease, nonalcoholic 11/09/2022     Priority: Medium    Gastroesophageal reflux disease with esophagitis, unspecified whether hemorrhage 11/09/2022     Priority: Medium    Diabetic polyneuropathy associated with type 2 diabetes  mellitus (H) 06/08/2022     Priority: Medium    Tachycardia 06/08/2022     Priority: Medium    Diabetes mellitus, type 2 (H) 03/30/2022     Priority: Medium    Family history of type 1 diabetes mellitus 03/30/2022     Priority: Medium    Abnormal LFTs 03/21/2022     Priority: Medium    Cervical radiculopathy 04/06/2021     Priority: Medium    Digital mucinous cyst of toe of right foot 01/10/2020     Priority: Medium    Primary osteoarthritis of both knees 01/10/2020     Priority: Medium    Gastroesophageal reflux disease without esophagitis 01/10/2020     Priority: Medium    Mild obstructive sleep apnea 11/05/2018     Priority: Medium     Home Sleep Apnea Testing - 10/29/18: 215 lbs 0 oz: AHI 17.9/hr. Supine AHI 17.9/hr. Oxygen Pavel of 80%.  Baseline 93.7%.  Sp02 =< 88% for 15.6 minutes.        Class 1 obesity due to excess calories with body mass index (BMI) of 31.0 to 31.9 in adult, unspecified whether serious comorbidity present 10/15/2018     Priority: Medium    Elevated liver enzymes 09/07/2018     Priority: Medium    Hyperlipidemia LDL goal <160 09/07/2018     Priority: Medium    CARDIOVASCULAR SCREENING; LDL GOAL LESS THAN 160 08/27/2018     Priority: Medium    Advanced directives, counseling/discussion 06/20/2017     Priority: Medium     Advance Care Planning 6/20/2017: ACP Review of Chart / Resources Provided:  Reviewed chart for advance care plan.  Farhat Tejada has an advance care plan which needs to be updated. Patient states presence of new/updated ACP document. Copy requested  Added by Leticia Almaraz           Thalassemia 11/13/2014     Priority: Medium       PERTINENT PAST MEDICAL HISTORY:    Past Medical History:   Diagnosis Date    Diabetes (H)     HLD (hyperlipidemia)     Obesity     Sleep apnea     does not like to use CPAP       PREVIOUS SURGERIES:    Past Surgical History:   Procedure Laterality Date    CHOLECYSTECTOMY  03/23/2022    ENDOSCOPIC RETROGRADE CHOLANGIOPANCREATOGRAM N/A 3/22/2022     Procedure: ENDOSCOPIC RETROGRADE CHOLANGIOPANCREATOGRAPHY, BILIARY SPHINCTEROTOMY STONE EXTRACTION;  Surgeon: Farhat Rodriguez MD;  Location: [facility] OR    ESOPHAGOSCOPY, GASTROSCOPY, DUODENOSCOPY (EGD), COMBINED N/A 3/22/2022    Procedure: ESOPHAGOGASTRODUODENOSCOPY, WITH FINE NEEDLE ASPIRATION BIOPSY, WITH ENDOSCOPIC ULTRASOUND GUIDANCE;  Surgeon: Farhat Rodriguez MD;  Location: [facility] OR    LAPAROSCOPIC CHOLECYSTECTOMY N/A 3/23/2022    Procedure: CHOLECYSTECTOMY, LAPAROSCOPIC;  Surgeon: Branden Ponce DO;  Location: [facility] OR    VASECTOMY      WISDOM ST GUIDEWIRE         PREVIOUS ENDOSCOPY:  Colonoscopy (2021): - Non-bleeding internal hemorrhoids.                        - Two 5 mm polyps in the sigmoid colon and in the mid                        ascending colon, removed with a jumbo cold forceps.                        Resected and retrieved.                        - The examined portion of the ileum was normal.   Final Diagnosis       A. Mid ascending colon, polyp, biopsy - Tubular adenoma. Negative for high-grade dysplasia.     B. Colon, polyp at 25 cm, biopsy - Hyperplastic polyp.           ALLERGIES:   No Known Allergies    PERTINENT MEDICATIONS:    Current Outpatient Medications:     alcohol swab prep pads, Use to swab area of injection/shelby as directed. Check blood glucose twice daily, Disp: 100 each, Rfl: 6    aspirin (ASA) 81 MG EC tablet, Take 1 tablet (81 mg) by mouth daily, Disp: 90 tablet, Rfl: 3    atorvastatin (LIPITOR) 40 MG tablet, Take 1 tablet (40 mg) by mouth daily (heart protection), Disp: 90 tablet, Rfl: 1    blood glucose (NO BRAND SPECIFIED) test strip, Use to test blood sugar 1 times daily as needed or as directed., Disp: 100 strip, Rfl: 3    fluconazole (DIFLUCAN) 150 MG tablet, , Disp: , Rfl:     lisinopril (ZESTRIL) 20 MG tablet, TAKE 1 TABLET(20 MG) BY MOUTH DAILY, Disp: 90 tablet, Rfl: 0    meclizine (ANTIVERT) 25 MG tablet, Take 1 tablet (25 mg) by mouth 3  times daily as needed for dizziness or nausea, Disp: 25 tablet, Rfl: 0    metFORMIN (GLUCOPHAGE XR) 500 MG 24 hr tablet, Take 2 tablets (1,000 mg) by mouth 2 times daily (with meals), Disp: 360 tablet, Rfl: 1    omeprazole (PRILOSEC) 20 MG DR capsule, Take 1 capsule (20 mg) by mouth daily as needed (heart burn), Disp: 90 capsule, Rfl: 1    SOCIAL HISTORY:    Social History     Socioeconomic History    Marital status:      Spouse name: Not on file    Number of children: Not on file    Years of education: Not on file    Highest education level: Not on file   Occupational History    Not on file   Tobacco Use    Smoking status: Never    Smokeless tobacco: Never   Vaping Use    Vaping Use: Never used   Substance and Sexual Activity    Alcohol use: Yes    Drug use: No    Sexual activity: Yes     Partners: Female   Other Topics Concern    Parent/sibling w/ CABG, MI or angioplasty before 65F 55M? Yes     Comment: mother  at age 36 from heart condition    Social History Narrative    Not on file     Social Determinants of Health     Financial Resource Strain: Not on file   Food Insecurity: Not on file   Transportation Needs: Not on file   Physical Activity: Not on file   Stress: Not on file   Social Connections: Not on file   Interpersonal Safety: Not on file   Housing Stability: Not on file       FAMILY HISTORY:  FH of CRC: None.  FH of IBD: None.  Family History   Problem Relation Age of Onset    Hypertension Mother     Heart Failure Mother 36    Diabetes Father         Type 1    Hypertension Father     Cerebrovascular Disease Father 88    Thyroid Cancer Father     Thyroid Cancer Brother         Papillary    Diabetes Paternal Aunt         Type 1       Past/family/social history reviewed and no changes    PHYSICAL EXAMINATION:  Constitutional: aaox3, cooperative, pleasant, not dyspneic/diaphoretic, no acute distress  Vitals reviewed: BP (!) 146/100 (BP Location: Left arm, Patient Position: Sitting, Cuff Size:  "Adult Regular)   Pulse 79   Ht 1.778 m (5' 10\")   Wt 91.4 kg (201 lb 8 oz)   SpO2 99%   BMI 28.91 kg/m    Wt:   Wt Readings from Last 2 Encounters:   09/20/23 91.2 kg (201 lb)   05/31/23 90.3 kg (199 lb)      Eyes: Sclera anicteric/injected  Respiratory: Unlabored breathing  Skin: warm, perfused, no jaundice  Psych: Normal affect  MSK: Normal gait      PERTINENT STUDIES:    Lab on 09/20/2023   Component Date Value Ref Range Status    Iron 09/20/2023 80  61 - 157 ug/dL Final    Iron Binding Capacity 09/20/2023 382  240 - 430 ug/dL Final    Iron Sat Index 09/20/2023 21  15 - 46 % Final    Ferritin 09/20/2023 82  31 - 409 ng/mL Final    Bilirubin Direct 09/20/2023 0.43 (H)  0.00 - 0.30 mg/dL Final    Bilirubin Total 09/20/2023 1.7 (H)  <=1.2 mg/dL Final    WBC Count 09/20/2023 6.9  4.0 - 11.0 10e3/uL Final    RBC Count 09/20/2023 6.74 (H)  4.40 - 5.90 10e6/uL Final    Hemoglobin 09/20/2023 13.0 (L)  13.3 - 17.7 g/dL Final    Hematocrit 09/20/2023 42.2  40.0 - 53.0 % Final    MCV 09/20/2023 63 (L)  78 - 100 fL Final    MCH 09/20/2023 19.3 (L)  26.5 - 33.0 pg Final    MCHC 09/20/2023 30.8 (L)  31.5 - 36.5 g/dL Final    RDW 09/20/2023 17.2 (H)  10.0 - 15.0 % Final    Platelet Count 09/20/2023 259  150 - 450 10e3/uL Final    % Neutrophils 09/20/2023 63  % Final    % Lymphocytes 09/20/2023 28  % Final    % Monocytes 09/20/2023 7  % Final    % Eosinophils 09/20/2023 2  % Final    % Basophils 09/20/2023 0  % Final    % Immature Granulocytes 09/20/2023 0  % Final    NRBCs per 100 WBC 09/20/2023 0  <1 /100 Final    Absolute Neutrophils 09/20/2023 4.3  1.6 - 8.3 10e3/uL Final    Absolute Lymphocytes 09/20/2023 2.0  0.8 - 5.3 10e3/uL Final    Absolute Monocytes 09/20/2023 0.5  0.0 - 1.3 10e3/uL Final    Absolute Eosinophils 09/20/2023 0.1  0.0 - 0.7 10e3/uL Final    Absolute Basophils 09/20/2023 0.0  0.0 - 0.2 10e3/uL Final    Absolute Immature Granulocytes 09/20/2023 0.0  <=0.4 10e3/uL Final    Absolute NRBCs 09/20/2023 0.0 "  10e3/uL Final    Hold Specimen 09/20/2023 [facility]   Final    Platelet Assessment 09/20/2023 Automated Count Confirmed. Platelet morphology is normal.  Automated Count Confirmed. Platelet morphology is normal. Final    Roshan Cells 09/20/2023 Slight (A)  None Seen Final    Elliptocytes 09/20/2023 Moderate (A)  None Seen Final    RBC Fragments 09/20/2023 Slight (A)  None Seen Final    Teardrop Cells 09/20/2023 Slight (A)  None Seen Final    RBC Morphology 09/20/2023 Confirmed RBC Indices   Final

## 2023-10-18 NOTE — LETTER
10/18/2023         RE: Farhat Tejada  [address] [facility] [id number]        Dear Colleague,    Thank you for referring your patient, Farhat Tejada, to the [facility]. Please see a copy of my visit note below.    GI CLINIC VISIT    CC/REFERRING MD:  Carin Huntley  REASON FOR CONSULTATION: MYA    ASSESSMENT/PLAN:  64 y/o M presents for evaluation of iron deficiency anemia.     #Iron Deficiency Anemia  He has been following with hematology since may 2023 or evaluation of anemia - he was found to have iron deficiency anemia and has been supplementing with oral iron. He was referred to GI for further evaluation of MYA. He has previously had a colonoscopy in 2021, which showed polyps and and ERCP in 2022 which showed a grossly normal esophagus. A repeat colonoscopy and EGD is warranted at this time for further evaluation of his MYA, if these are unrevealing would pursue VCE. He does have a hx of reflux and has been taking omeprazole 20mg daily for about 6 months, he will continue this - has no reflux symptoms currently.  --schedule EGD and colonoscopy.  --continue oral iron supplementation.  --avoid NSAIDS  --future considerations: If EGD and C-scope are unrevealing, would obtain a VCE.      RTC 3 months    Thank you for this consultation.  It was a pleasure to participate in the care of this patient; please contact us with any further questions.       31 minutes spent on the date of the encounter doing chart review, review of outside records, review of test results, patient visit, and documentation, and discussion with family.    This note was created with voice recognition software, and while reviewed for accuracy, typos may remain.    Sulaiman Boss PA-C  Division of Gastroenterology, Hepatology and Nutrition  [facility]  64 y/o M presents for evaluation of iron deficiency anemia.     Patient was referred by oncology  for evaluation of MYA- since the  end of 2022 he has been feeling more tired than usual admitted to mild dyspnea on exertion.  Prior to that he was fully functional with good energy and had no symptoms.  During this time he has had negative cardiac work-up.  MRI/MRA of the brain was unremarkable. He has had mild anemia with marked microcytosis since 2012. He has been taking oral iron since June 2023, and has been feeling better from an energy standpoint, he has been tolerating this well. Most recent Hgb on 9/20/23 was 13 - iron studies were normal as well.    Denies abdominal pain. He does have a hx of acid reflux, which he describes as a burning pain in his chest, generally occurs after eating -- he no longer has symptoms, as he takes omeprazole 20mg as needed. He will generally take this daily over the last 6 months, and was recommended to take this daily by his PCP as he had a flare up of reflux on a cruise. He had an EGD prior to 2002, cannot recall anything alarming. Denies nausea, vomiting, or hematemesis. He does take 81mg aspirin daily.    In regards to his bowel movements, he generally has a bowel movement daily, generally they are formed, but can have a looser stool once every couple of weeks. Patient is on metformin. Denies any BRBPR or black/tarry stools.      Denies NSAIDs or Tylenol. Denies use of OTC herbal supplements/weight loss products.      He drinks 1-2 liquor drinks a week.  Denies tobacco products. No recreational drug use.     No family history of GI related malignancy (esophageal, gastric, pancreatic, liver or colon) or family history of IBD/celiac disease.     ROS:    No fevers or chills  No weight loss  No shortness of breath or wheezing  No chest pain or pressure  No odynophagia or dysphagia  No BRBPR, hematochezia, melena  No anxiety or depression    PROBLEM LIST  Patient Active Problem List    Diagnosis Date Noted     Hypertension goal BP (blood pressure) < 140/90 03/20/2023      Priority: Medium     Fatty liver disease, nonalcoholic 11/09/2022     Priority: Medium     Gastroesophageal reflux disease with esophagitis, unspecified whether hemorrhage 11/09/2022     Priority: Medium     Diabetic polyneuropathy associated with type 2 diabetes mellitus (H) 06/08/2022     Priority: Medium     Tachycardia 06/08/2022     Priority: Medium     Diabetes mellitus, type 2 (H) 03/30/2022     Priority: Medium     Family history of type 1 diabetes mellitus 03/30/2022     Priority: Medium     Abnormal LFTs 03/21/2022     Priority: Medium     Cervical radiculopathy 04/06/2021     Priority: Medium     Digital mucinous cyst of toe of right foot 01/10/2020     Priority: Medium     Primary osteoarthritis of both knees 01/10/2020     Priority: Medium     Gastroesophageal reflux disease without esophagitis 01/10/2020     Priority: Medium     Mild obstructive sleep apnea 11/05/2018     Priority: Medium     Home Sleep Apnea Testing - 10/29/18: 215 lbs 0 oz: AHI 17.9/hr. Supine AHI 17.9/hr. Oxygen Pavel of 80%.  Baseline 93.7%.  Sp02 =< 88% for 15.6 minutes.         Class 1 obesity due to excess calories with body mass index (BMI) of 31.0 to 31.9 in adult, unspecified whether serious comorbidity present 10/15/2018     Priority: Medium     Elevated liver enzymes 09/07/2018     Priority: Medium     Hyperlipidemia LDL goal <160 09/07/2018     Priority: Medium     CARDIOVASCULAR SCREENING; LDL GOAL LESS THAN 160 08/27/2018     Priority: Medium     Advanced directives, counseling/discussion 06/20/2017     Priority: Medium     Advance Care Planning 6/20/2017: ACP Review of Chart / Resources Provided:  Reviewed chart for advance care plan.  Farhat Tejada has an advance care plan which needs to be updated. Patient states presence of new/updated ACP document. Copy requested  Added by Leticia Almaraz            Thalassemia 11/13/2014     Priority: Medium       PERTINENT PAST MEDICAL HISTORY:    Past Medical History:    Diagnosis Date     Diabetes (H)      HLD (hyperlipidemia)      Obesity      Sleep apnea     does not like to use CPAP       PREVIOUS SURGERIES:    Past Surgical History:   Procedure Laterality Date     CHOLECYSTECTOMY  03/23/2022     ENDOSCOPIC RETROGRADE CHOLANGIOPANCREATOGRAM N/A 3/22/2022    Procedure: ENDOSCOPIC RETROGRADE CHOLANGIOPANCREATOGRAPHY, BILIARY SPHINCTEROTOMY STONE EXTRACTION;  Surgeon: Farhat Rodriguez MD;  Location: [facility] OR     ESOPHAGOSCOPY, GASTROSCOPY, DUODENOSCOPY (EGD), COMBINED N/A 3/22/2022    Procedure: ESOPHAGOGASTRODUODENOSCOPY, WITH FINE NEEDLE ASPIRATION BIOPSY, WITH ENDOSCOPIC ULTRASOUND GUIDANCE;  Surgeon: Farhat Rodriguez MD;  Location: [facility] OR     LAPAROSCOPIC CHOLECYSTECTOMY N/A 3/23/2022    Procedure: CHOLECYSTECTOMY, LAPAROSCOPIC;  Surgeon: Branden Ponce DO;  Location: [facility] OR     VASECTOMY       WISDOM ST GUIDEWIRE         PREVIOUS ENDOSCOPY:  Colonoscopy (2021): - Non-bleeding internal hemorrhoids.                        - Two 5 mm polyps in the sigmoid colon and in the mid                        ascending colon, removed with a jumbo cold forceps.                        Resected and retrieved.                        - The examined portion of the ileum was normal.   Final Diagnosis       A. Mid ascending colon, polyp, biopsy - Tubular adenoma. Negative for high-grade dysplasia.     B. Colon, polyp at 25 cm, biopsy - Hyperplastic polyp.           ALLERGIES:   No Known Allergies    PERTINENT MEDICATIONS:    Current Outpatient Medications:      alcohol swab prep pads, Use to swab area of injection/shelby as directed. Check blood glucose twice daily, Disp: 100 each, Rfl: 6     aspirin (ASA) 81 MG EC tablet, Take 1 tablet (81 mg) by mouth daily, Disp: 90 tablet, Rfl: 3     atorvastatin (LIPITOR) 40 MG tablet, Take 1 tablet (40 mg) by mouth daily (heart protection), Disp: 90 tablet, Rfl: 1     blood glucose (NO BRAND SPECIFIED) test strip, Use to test  blood sugar 1 times daily as needed or as directed., Disp: 100 strip, Rfl: 3     fluconazole (DIFLUCAN) 150 MG tablet, , Disp: , Rfl:      lisinopril (ZESTRIL) 20 MG tablet, TAKE 1 TABLET(20 MG) BY MOUTH DAILY, Disp: 90 tablet, Rfl: 0     meclizine (ANTIVERT) 25 MG tablet, Take 1 tablet (25 mg) by mouth 3 times daily as needed for dizziness or nausea, Disp: 25 tablet, Rfl: 0     metFORMIN (GLUCOPHAGE XR) 500 MG 24 hr tablet, Take 2 tablets (1,000 mg) by mouth 2 times daily (with meals), Disp: 360 tablet, Rfl: 1     omeprazole (PRILOSEC) 20 MG DR capsule, Take 1 capsule (20 mg) by mouth daily as needed (heart burn), Disp: 90 capsule, Rfl: 1    SOCIAL HISTORY:    Social History     Socioeconomic History     Marital status:      Spouse name: Not on file     Number of children: Not on file     Years of education: Not on file     Highest education level: Not on file   Occupational History     Not on file   Tobacco Use     Smoking status: Never     Smokeless tobacco: Never   Vaping Use     Vaping Use: Never used   Substance and Sexual Activity     Alcohol use: Yes     Drug use: No     Sexual activity: Yes     Partners: Female   Other Topics Concern     Parent/sibling w/ CABG, MI or angioplasty before 65F 55M? Yes     Comment: mother  at age 36 from heart condition    Social History Narrative     Not on file     Social Determinants of Health     Financial Resource Strain: Not on file   Food Insecurity: Not on file   Transportation Needs: Not on file   Physical Activity: Not on file   Stress: Not on file   Social Connections: Not on file   Interpersonal Safety: Not on file   Housing Stability: Not on file       FAMILY HISTORY:  FH of CRC: None.  FH of IBD: None.  Family History   Problem Relation Age of Onset     Hypertension Mother      Heart Failure Mother 36     Diabetes Father         Type 1     Hypertension Father      Cerebrovascular Disease Father 88     Thyroid Cancer Father      Thyroid Cancer  "Brother         Papillary     Diabetes Paternal Aunt         Type 1       Past/family/social history reviewed and no changes    PHYSICAL EXAMINATION:  Constitutional: aaox3, cooperative, pleasant, not dyspneic/diaphoretic, no acute distress  Vitals reviewed: BP (!) 146/100 (BP Location: Left arm, Patient Position: Sitting, Cuff Size: Adult Regular)   Pulse 79   Ht 1.778 m (5' 10\")   Wt 91.4 kg (201 lb 8 oz)   SpO2 99%   BMI 28.91 kg/m    Wt:   Wt Readings from Last 2 Encounters:   09/20/23 91.2 kg (201 lb)   05/31/23 90.3 kg (199 lb)      Eyes: Sclera anicteric/injected  Respiratory: Unlabored breathing  Skin: warm, perfused, no jaundice  Psych: Normal affect  MSK: Normal gait      PERTINENT STUDIES:    Lab on 09/20/2023   Component Date Value Ref Range Status     Iron 09/20/2023 80  61 - 157 ug/dL Final     Iron Binding Capacity 09/20/2023 382  240 - 430 ug/dL Final     Iron Sat Index 09/20/2023 21  15 - 46 % Final     Ferritin 09/20/2023 82  31 - 409 ng/mL Final     Bilirubin Direct 09/20/2023 0.43 (H)  0.00 - 0.30 mg/dL Final     Bilirubin Total 09/20/2023 1.7 (H)  <=1.2 mg/dL Final     WBC Count 09/20/2023 6.9  4.0 - 11.0 10e3/uL Final     RBC Count 09/20/2023 6.74 (H)  4.40 - 5.90 10e6/uL Final     Hemoglobin 09/20/2023 13.0 (L)  13.3 - 17.7 g/dL Final     Hematocrit 09/20/2023 42.2  40.0 - 53.0 % Final     MCV 09/20/2023 63 (L)  78 - 100 fL Final     MCH 09/20/2023 19.3 (L)  26.5 - 33.0 pg Final     MCHC 09/20/2023 30.8 (L)  31.5 - 36.5 g/dL Final     RDW 09/20/2023 17.2 (H)  10.0 - 15.0 % Final     Platelet Count 09/20/2023 259  150 - 450 10e3/uL Final     % Neutrophils 09/20/2023 63  % Final     % Lymphocytes 09/20/2023 28  % Final     % Monocytes 09/20/2023 7  % Final     % Eosinophils 09/20/2023 2  % Final     % Basophils 09/20/2023 0  % Final     % Immature Granulocytes 09/20/2023 0  % Final     NRBCs per 100 WBC 09/20/2023 0  <1 /100 Final     Absolute Neutrophils 09/20/2023 4.3  1.6 - 8.3 10e3/uL " Final     Absolute Lymphocytes 09/20/2023 2.0  0.8 - 5.3 10e3/uL Final     Absolute Monocytes 09/20/2023 0.5  0.0 - 1.3 10e3/uL Final     Absolute Eosinophils 09/20/2023 0.1  0.0 - 0.7 10e3/uL Final     Absolute Basophils 09/20/2023 0.0  0.0 - 0.2 10e3/uL Final     Absolute Immature Granulocytes 09/20/2023 0.0  <=0.4 10e3/uL Final     Absolute NRBCs 09/20/2023 0.0  10e3/uL Final     Hold Specimen 09/20/2023 [facility]   Final     Platelet Assessment 09/20/2023 Automated Count Confirmed. Platelet morphology is normal.  Automated Count Confirmed. Platelet morphology is normal. Final     Deary Cells 09/20/2023 Slight (A)  None Seen Final     Elliptocytes 09/20/2023 Moderate (A)  None Seen Final     RBC Fragments 09/20/2023 Slight (A)  None Seen Final     Teardrop Cells 09/20/2023 Slight (A)  None Seen Final     RBC Morphology 09/20/2023 Confirmed RBC Indices   Final       Again, thank you for allowing me to participate in the care of your patient.        Sincerely,        Sulaiman Boss PA-C

## 2023-10-18 NOTE — PATIENT INSTRUCTIONS
It was a pleasure taking care of you today.  I've included a brief summary of our discussion and care plan from today's visit below.  Please review this information with your primary care provider.  ______________________________________________________________________    My recommendations are summarized as follows:  --please schedule your upper endoscopy and colonoscopy --continue oral iron supplementation.    -- please see scheduling information provided below     Return to GI Clinic in 3 months to review your progress.    ______________________________________________________________________    How do I schedule labs, imaging studies, or procedures that were ordered in clinic today?     Labs: To schedule lab appointment at the [facility], use my chart or call [phone]. If you have a [facility] lab closer to home where you are regularly seen you can give them a call.     Procedures: If a colonoscopy, upper endoscopy, breath test, esophageal manometry, or pH impedence was ordered today, our endoscopy team will call you to schedule this. If you have not heard from our endoscopy team within a week, please call [phone] to schedule.     Imaging Studies: If you were scheduled for a CT scan, X-ray, MRI, ultrasound, HIDA scan or other imaging study, please call [phone] to have this scheduled.     Referral: If a referral to another specialty was ordered, expect a phone call or follow instructions above. If you have not heard from anyone regarding your referral in a week, please call our clinic to check the status.     Who do I call with any questions after my visit?  Please be in touch if there are any further questions that arise following today's visit.  There are multiple ways to contact your gastroenterology care team.      During business hours, you may reach a Gastroenterology nurse at [phone]    To schedule or reschedule an appointment, please  call [phone].     You can always send a secure message through IntelliFlo.  IntelliFlo messages are answered by your nurse or doctor typically within 24 hours.  Please allow extra time on weekends and holidays.      For urgent/emergent questions after business hours, you may reach the on-call GI Fellow by contacting the [facility]  at [phone].     How will I get the results of any tests ordered?    You will receive all of your results.  If you have signed up for [employer]ience[employer], any tests ordered at your visit will be available to you after your provider reviews them.  Typically this takes 1-2 weeks.  If there are urgent results that require a change in your care plan, your provider or nurse will call you to discuss the next steps.      What is IntelliFlo?  IntelliFlo is a secure way for you to access all of your healthcare records from the [facility].  It is a web based computer program, so you can sign on to it from any location.  It also allows you to send secure messages to your care team.  I recommend signing up for IntelliFlo access if you have not already done so and are comfortable with using a computer.      How to I schedule a follow-up visit?  If you did not schedule a follow-up visit today, please call [phone] to schedule a follow-up office visit.      Sincerely,    Sulaiman Boss PA-C  Division of Gastroenterology, Hepatology & Nutrition  [facility]

## 2023-10-18 NOTE — NURSING NOTE
"Chief Complaint   Patient presents with    New Patient     New consult for iron deficiency anemia.     He requests these members of his care team be copied on today's visit information:  PCP: Helena Magaña    Referring Provider:  Carin Huntley MD  [address] [place] [id number]    Vitals:    10/18/23 1101   BP: (!) 146/100   BP Location: Left arm   Patient Position: Sitting   Cuff Size: Adult Regular   Pulse: 79   SpO2: 99%   Weight: 91.4 kg (201 lb 8 oz)   Height: 1.778 m (5' 10\")     Body mass index is 28.91 kg/m .    Medications were reconciled.        Geeta Meadows CMA    "

## 2023-10-27 ENCOUNTER — TRANSFERRED RECORDS (OUTPATIENT)
Dept: HEALTH INFORMATION MANAGEMENT | Facility: CLINIC | Age: 63
End: 2023-10-27
Payer: COMMERCIAL

## 2023-10-27 LAB — RETINOPATHY: NEGATIVE

## 2023-10-30 DIAGNOSIS — K21.00 GASTROESOPHAGEAL REFLUX DISEASE WITH ESOPHAGITIS, UNSPECIFIED WHETHER HEMORRHAGE: ICD-10-CM

## 2023-10-30 DIAGNOSIS — E11.65 TYPE 2 DIABETES MELLITUS WITH HYPERGLYCEMIA, WITHOUT LONG-TERM CURRENT USE OF INSULIN (H): ICD-10-CM

## 2023-10-30 RX ORDER — METFORMIN HCL 500 MG
1000 TABLET, EXTENDED RELEASE 24 HR ORAL 2 TIMES DAILY WITH MEALS
Qty: 360 TABLET | Refills: 0 | Status: SHIPPED | OUTPATIENT
Start: 2023-10-30 | End: 2023-12-13

## 2023-10-30 RX ORDER — ASPIRIN 81 MG/1
81 TABLET, COATED ORAL DAILY
Qty: 90 TABLET | Refills: 0 | Status: SHIPPED | OUTPATIENT
Start: 2023-10-30 | End: 2023-12-13

## 2023-10-30 RX ORDER — ATORVASTATIN CALCIUM 40 MG/1
40 TABLET, FILM COATED ORAL DAILY
Qty: 90 TABLET | Refills: 0 | Status: SHIPPED | OUTPATIENT
Start: 2023-10-30 | End: 2023-12-13

## 2023-11-02 ENCOUNTER — TELEPHONE (OUTPATIENT)
Dept: FAMILY MEDICINE | Facility: CLINIC | Age: 63
End: 2023-11-02
Payer: COMMERCIAL

## 2023-11-06 ENCOUNTER — TELEPHONE (OUTPATIENT)
Dept: GASTROENTEROLOGY | Facility: CLINIC | Age: 63
End: 2023-11-06

## 2023-11-06 NOTE — TELEPHONE ENCOUNTER
"Endoscopy Scheduling Screen    Have you had a positive Covid test in the last 14 days?  No    Are you active on MyChart?   Yes    What insurance is in the chart?  Other:  BCBS    Ordering/Referring Provider:     VIKTORIYA NARAYANAN      (If ordering provider performs procedure, schedule with ordering provider unless otherwise instructed. )    BMI: Estimated body mass index is 28.91 kg/m  as calculated from the following:    Height as of 10/18/23: 1.778 m (5' 10\").    Weight as of 10/18/23: 91.4 kg (201 lb 8 oz).     Sedation Ordered  moderate sedation.   If patient BMI > 50 do not schedule in [facility].    If patient BMI > 45 do not schedule at [facility].    Are you taking methadone or Suboxone?  No    Are you taking any prescription medications for pain 3 or more times per week?   No    Do you have a history of malignant hyperthermia or adverse reaction to anesthesia?  No    (Females) Are you currently pregnant?   No     Have you been diagnosed or told you have pulmonary hypertension?   No    Do you have an LVAD?  No    Have you been told you have moderate to severe sleep apnea?  No    Have you been told you have COPD, asthma, or any other lung disease?  No    Do you have any heart conditions?  No     Have you ever had an organ transplant?   No    Have you ever had or are you awaiting a heart or lung transplant?   No    Have you had a stroke or transient ischemic attack (TIA aka \"mini stroke\" in the last 6 months?   No    Have you been diagnosed with or been told you have cirrhosis of the liver?   No    Are you currently on dialysis?   No    Do you need assistance transferring?   No    BMI: Estimated body mass index is 28.91 kg/m  as calculated from the following:    Height as of 10/18/23: 1.778 m (5' 10\").    Weight as of 10/18/23: 91.4 kg (201 lb 8 oz).     Is patients BMI > 40 and scheduling location [facility]?  No    Do you take an injectable medication for weight loss or diabetes (excluding insulin)?  No    Do you take the medication " Naltrexone?  No    Do you take blood thinners?  No       Prep   Are you currently on dialysis or do you have chronic kidney disease?  No    Do you have a diagnosis of diabetes?  Yes (Golytely Prep)    Do you have a diagnosis of cystic fibrosis (CF)?  No    On a regular basis do you go 3 -5 days between bowel movements?  No    BMI > 40?  No    Preferred Pharmacy:    [id number] - [place], [place] - [address] [facility] [facility] [place] [address] [address] [address]  Phone: [phone] Fax: [phone]      Final Scheduling Details   Colonoscopy prep sent?  Standard Golytely    Procedure scheduled  Colonoscopy / Upper endoscopy (EGD)    Surgeon:  MEGAN     Date of procedure:  12/20/2023     Pre-OP / PAC:   No - Not required for this site.    Location  [facility]  NEXT AVAILABLE    Sedation   MAC/Deep Sedation  PER LOCATION      Patient Reminders:   You will receive a call from a Nurse to review instructions and health history.  This assessment must be completed prior to your procedure.  Failure to complete the Nurse assessment may result in the procedure being cancelled.      On the day of your procedure, please designate an adult(s) who can drive you home stay with you for the next 24 hours. The medicines used in the exam will make you sleepy. You will not be able to drive.      You cannot take public transportation, ride share services, or non-medical taxi service without a responsible caregiver.  Medical transport services are allowed with the requirement that a responsible caregiver will receive you at your destination.  We require that drivers and caregivers are confirmed prior to your procedure.

## 2023-12-11 DIAGNOSIS — E11.65 TYPE 2 DIABETES MELLITUS WITH HYPERGLYCEMIA, WITHOUT LONG-TERM CURRENT USE OF INSULIN (H): Primary | ICD-10-CM

## 2023-12-13 ENCOUNTER — OFFICE VISIT (OUTPATIENT)
Dept: FAMILY MEDICINE | Facility: CLINIC | Age: 63
End: 2023-12-13
Payer: COMMERCIAL

## 2023-12-13 VITALS
OXYGEN SATURATION: 97 % | TEMPERATURE: 97.8 F | HEART RATE: 76 BPM | DIASTOLIC BLOOD PRESSURE: 76 MMHG | RESPIRATION RATE: 16 BRPM | SYSTOLIC BLOOD PRESSURE: 130 MMHG | WEIGHT: 199 LBS | HEIGHT: 70 IN | BODY MASS INDEX: 28.49 KG/M2

## 2023-12-13 DIAGNOSIS — R35.0 URINARY FREQUENCY: ICD-10-CM

## 2023-12-13 DIAGNOSIS — N52.9 ERECTILE DYSFUNCTION, UNSPECIFIED ERECTILE DYSFUNCTION TYPE: ICD-10-CM

## 2023-12-13 DIAGNOSIS — R97.20 ELEVATED PROSTATE SPECIFIC ANTIGEN (PSA): ICD-10-CM

## 2023-12-13 DIAGNOSIS — I10 HYPERTENSION GOAL BP (BLOOD PRESSURE) < 140/90: ICD-10-CM

## 2023-12-13 DIAGNOSIS — G47.33 MILD OBSTRUCTIVE SLEEP APNEA: ICD-10-CM

## 2023-12-13 DIAGNOSIS — E11.65 TYPE 2 DIABETES MELLITUS WITH HYPERGLYCEMIA, WITHOUT LONG-TERM CURRENT USE OF INSULIN (H): Primary | ICD-10-CM

## 2023-12-13 LAB
ANION GAP SERPL CALCULATED.3IONS-SCNC: 13 MMOL/L (ref 7–15)
BUN SERPL-MCNC: 15.3 MG/DL (ref 8–23)
CALCIUM SERPL-MCNC: 11 MG/DL (ref 8.8–10.2)
CHLORIDE SERPL-SCNC: 102 MMOL/L (ref 98–107)
CREAT SERPL-MCNC: 1.1 MG/DL (ref 0.67–1.17)
CREAT UR-MCNC: 104 MG/DL
DEPRECATED HCO3 PLAS-SCNC: 24 MMOL/L (ref 22–29)
EGFRCR SERPLBLD CKD-EPI 2021: 75 ML/MIN/1.73M2
GLUCOSE SERPL-MCNC: 110 MG/DL (ref 70–99)
HBA1C MFR BLD: 5.9 % (ref 0–5.6)
MICROALBUMIN UR-MCNC: <12 MG/L
MICROALBUMIN/CREAT UR: NORMAL MG/G{CREAT}
POTASSIUM SERPL-SCNC: 5.2 MMOL/L (ref 3.4–5.3)
SODIUM SERPL-SCNC: 139 MMOL/L (ref 135–145)

## 2023-12-13 PROCEDURE — 83036 HEMOGLOBIN GLYCOSYLATED A1C: CPT | Performed by: PHYSICIAN ASSISTANT

## 2023-12-13 PROCEDURE — 99215 OFFICE O/P EST HI 40 MIN: CPT | Performed by: PHYSICIAN ASSISTANT

## 2023-12-13 PROCEDURE — G0103 PSA SCREENING: HCPCS | Performed by: PHYSICIAN ASSISTANT

## 2023-12-13 PROCEDURE — 80048 BASIC METABOLIC PNL TOTAL CA: CPT | Performed by: PHYSICIAN ASSISTANT

## 2023-12-13 PROCEDURE — 82043 UR ALBUMIN QUANTITATIVE: CPT | Performed by: PHYSICIAN ASSISTANT

## 2023-12-13 PROCEDURE — 36415 COLL VENOUS BLD VENIPUNCTURE: CPT | Performed by: PHYSICIAN ASSISTANT

## 2023-12-13 PROCEDURE — 82570 ASSAY OF URINE CREATININE: CPT | Performed by: PHYSICIAN ASSISTANT

## 2023-12-13 RX ORDER — LISINOPRIL 20 MG/1
20 TABLET ORAL DAILY
Qty: 90 TABLET | Refills: 1 | Status: SHIPPED | OUTPATIENT
Start: 2023-12-13 | End: 2024-07-10

## 2023-12-13 RX ORDER — METFORMIN HCL 500 MG
1000 TABLET, EXTENDED RELEASE 24 HR ORAL 2 TIMES DAILY WITH MEALS
Qty: 360 TABLET | Refills: 1 | Status: SHIPPED | OUTPATIENT
Start: 2023-12-13 | End: 2024-07-10

## 2023-12-13 RX ORDER — ASPIRIN 81 MG/1
81 TABLET ORAL DAILY
Qty: 90 TABLET | Refills: 1 | Status: SHIPPED | OUTPATIENT
Start: 2023-12-13 | End: 2024-05-28

## 2023-12-13 RX ORDER — SILDENAFIL CITRATE 20 MG/1
TABLET ORAL
Qty: 20 TABLET | Refills: 1 | Status: SHIPPED | OUTPATIENT
Start: 2023-12-13 | End: 2024-05-28

## 2023-12-13 RX ORDER — ATORVASTATIN CALCIUM 40 MG/1
40 TABLET, FILM COATED ORAL DAILY
Qty: 90 TABLET | Refills: 1 | Status: SHIPPED | OUTPATIENT
Start: 2023-12-13 | End: 2024-07-08

## 2023-12-13 NOTE — PATIENT INSTRUCTIONS
Cut back on Metformin from 2 tabs twice per day to 1 tab in am and 2 tabs in evening.     Hold metformin the evening before and morning of EGD.   Have apple juice on hand if sugars go low.       I recommend an RSV shot at the pharmacy.        Revatio: Take 1 tab 30 minutes prior to intercourse no more than once daily. May increase at subsequent doses by 1 tab with each subsequent ineffective dose up to a maximum dosage of 5 tablets.     Monitor urinary symptoms at night.  If they become more frequent/regular, reach out.  If you ever notice blood in the urine, please reach out.     I suggest scheduling a sleep study (ok to do when you return)

## 2023-12-13 NOTE — PROGRESS NOTES
Assessment & Plan     Type 2 diabetes mellitus with hyperglycemia, without long-term current use of insulin (H)  Doing well and A1c controlled.  He is interested in cutting back on medications if possible.  We can cut down from 2000 mg daily of metformin to 1500 mg daily.  I do not suggest stopping the metformin and it is a good medication to help continue to support the pancreas and slow the progression of diabetes.      - Hemoglobin A1c  - Basic metabolic panel  (Ca, Cl, CO2, Creat, Gluc, K, Na, BUN)  - Albumin Random Urine Quantitative with Creat Ratio  - atorvastatin (LIPITOR) 40 MG tablet; Take 1 tablet (40 mg) by mouth daily  - aspirin (ASPIRIN LOW DOSE) 81 MG EC tablet; Take 1 tablet (81 mg) by mouth daily  - lisinopril (ZESTRIL) 20 MG tablet; Take 1 tablet (20 mg) by mouth daily  - metFORMIN (GLUCOPHAGE XR) 500 MG 24 hr tablet; Take 2 tablets (1,000 mg) by mouth 2 times daily (with meals)    Hypertension goal BP (blood pressure) < 140/90  BP stable.   - lisinopril (ZESTRIL) 20 MG tablet; Take 1 tablet (20 mg) by mouth daily    Urinary frequency  He c/o peeing a few times per night (however it sounds like he is peeing more because he is awake so he decides to empty his bladder.  We does c/o symptoms of ED.    We discussed BPH and prostate cancer, signs and symptoms that may indicate more testing is needed.  We'll first start with a PSA and if normal, monitor symptoms.  If PSA elevated, will consider further testing with a possible prostate MRI.    We discussed other possible causes that could be waking him up, such as sleep apnea.   - PSA, screen    Mild obstructive sleep apnea  He has lost a significant amount of weight and sleep symptoms have improved compared to 5 years ago.  He is not currently using any device and only used the CPAP for a few weeks (as it was very uncomfortable).  I suggest we re-evaluate for degree of sleep apnea, this may be what is waking him up.   - Adult Sleep Eval & Management  Haley Referral; Future    Erectile dysfunction, unspecified erectile dysfunction type      The patient desires medication (phosphodiesterase type 5 inhibitor such as viagara, cialis, revatio) to treat his erectile dysfunction. History and physical exam has not disclosed any obvious treatable cause of this complaint. He is informed that these medications are usually not covered by insurance. It is available on a fee-for-service cost basis, and is relatively expensive.      Revatio: Take 1 tab 30 minutes prior to intercourse no more than once daily. May increase at subsequent doses by 1 tab with each subsequent ineffective dose up to a maximum dosage of 5 tablets.     The side effects of possible headache, flushing, dyspepsia and transient changes in vision have been explained.     The patient is not taking nitrates, and denies he has access to nitrates in any form at any time. I have counseled him that taking ED medications with nitrates of any form can cause death. Additionally, phosphodiesterase type 5 inhibitor concentrations can be increased by the following: cimetidine, erythromycin, itraconazole or ketoconazole. This patient does not take these drugs, but I have counseled him to avoid ED medications if he does take any of these.    We have also discussed the fact that there have been some deaths in patients after taking ED medications, felt due to the exertion of intercourse rather than the drug itself. The patient is aware of this, and accepts whatever unknown degree of risk there is in this aspect.      - sildenafil (REVATIO) 20 MG tablet; Take 20 mg by mouth daily as needed 30 min to 4 hours before intercourse. Never use with nitroglycerin, terazosin or doxazosin      55 minutes spent by me on the date of the encounter doing chart review, history and exam, documentation and further activities per the note       BMI:   Estimated body mass index is 28.55 kg/m  as calculated from the following:    Height as  "of this encounter: 1.778 m (5' 10\").    Weight as of this encounter: 90.3 kg (199 lb).   Weight management plan: Discussed healthy diet and exercise guidelines    FUTURE APPOINTMENTS:       - Follow-up visit in 6 months.    GRECIA Davila [facility] ASHLEE Dougherty is a 63 year old, presenting for the following health issues:  Diabetes, Hypertension, and Lipids      Patient with history of diabetes, hypertension and hyperlipidemia arrived for medication check.     **Labs collected upon arrival, waiting for results.     History of Present Illness       Diabetes:   He presents for follow up of diabetes.  He is checking home blood glucose a few times a week.   He checks blood glucose before meals.  Blood glucose is never over 200 and never under 70. He is aware of hypoglycemia symptoms including dizziness, weakness and lethargy.    He has no concerns regarding his diabetes at this time.  He is having numbness in feet and burning in feet.            He eats 2-3 servings of fruits and vegetables daily.He consumes 0 sweetened beverage(s) daily.He exercises with enough effort to increase his heart rate 10 to 19 minutes per day.  He exercises with enough effort to increase his heart rate 3 or less days per week.   He is taking medications regularly.         Hyperlipidemia Follow-Up    Are you regularly taking any medication or supplement to lower your cholesterol?   Yes- Atorvastatin   Are you having muscle aches or other side effects that you think could be caused by your cholesterol lowering medication?  No    Hypertension Follow-up    Do you check your blood pressure regularly outside of the clinic? Yes   Are you following a low salt diet? No  Are your blood pressures ever more than 140 on the top number (systolic) OR more   than 90 on the bottom number (diastolic), for example 140/90? Yes - every once in a while pt will have higher readings      Sometimes will have urinary frequency " "at night.  Last night was up every hour to urinate.   Up more than 3x/night approximately 3 times per night.    No blood in urine.   No family history of prostate cancer.      Wakes up throughout the night.  Will sometimes snore at night. Had a sleep study 5-6 years ago and diagnosed with moderate ALEXI and did not like the CPAP at the time.  No longer using that.    Snoring has improved in the past         BP Readings from Last 2 Encounters:   12/13/23 130/76   10/18/23 124/85     Hemoglobin A1C (%)   Date Value   12/13/2023 5.9 (H)   04/20/2023 6.0 (H)   08/23/2019 6.0 (H)   02/11/2019 6.1 (H)     LDL Cholesterol Calculated (mg/dL)   Date Value   04/20/2023 31   03/30/2022 119 (H)   01/10/2020 107 (H)   09/05/2018 129 (H)           Review of Systems   Constitutional, HEENT, cardiovascular, pulmonary, GI, , musculoskeletal, neuro, skin, endocrine and psych systems are negative, except as otherwise noted.      Objective    /76   Pulse 76   Temp 97.8  F (36.6  C) (Tympanic)   Resp 16   Ht 1.778 m (5' 10\")   Wt 90.3 kg (199 lb)   SpO2 97%   BMI 28.55 kg/m    Body mass index is 28.55 kg/m .  Physical Exam   GENERAL: healthy, alert and no distress  NECK: no adenopathy, no asymmetry, masses, or scars and thyroid normal to palpation  RESP: lungs clear to auscultation - no rales, rhonchi or wheezes  CV: regular rate and rhythm, normal S1 S2, no S3 or S4, no murmur, click or rub, no peripheral edema and peripheral pulses strong  ABDOMEN: soft, nontender, no hepatosplenomegaly, no masses and bowel sounds normal  MS: no gross musculoskeletal defects noted, no edema  Diabetic foot exam: normal DP and PT pulses, no trophic changes or ulcerative lesions, and normal sensory exam    Results for orders placed or performed in visit on 12/13/23 (from the past 24 hour(s))   Hemoglobin A1c   Result Value Ref Range    Hemoglobin A1C 5.9 (H) 0.0 - 5.6 %                 "

## 2023-12-14 LAB — PSA SERPL DL<=0.01 NG/ML-MCNC: 13.2 NG/ML (ref 0–4.5)

## 2023-12-19 ENCOUNTER — ANESTHESIA EVENT (OUTPATIENT)
Dept: GASTROENTEROLOGY | Facility: CLINIC | Age: 63
End: 2023-12-19
Payer: COMMERCIAL

## 2023-12-19 NOTE — H&P
[facility] Anesthesia Pre-op History and Physical    Farhat Tejada MRN# [id number]   Age: 63 year old YOB: 1960      Date of Surgery: 12/20/2023 Location [facility]      Date of Exam 12/20/2023 Facility (In hospital)       Home clinic: [facility]  Primary care provider: Helena Magaña         Chief Complaint and/or Reason for Procedure:   No chief complaint on file.  EGD  Colonoscopy. Anemia. Recent Hgb 13.  Exam 2016       Active problem list:     Patient Active Problem List    Diagnosis Date Noted    Erectile dysfunction, unspecified erectile dysfunction type 12/13/2023     Priority: Medium    Hypertension goal BP (blood pressure) < 140/90 03/20/2023     Priority: Medium    Fatty liver disease, nonalcoholic 11/09/2022     Priority: Medium    Gastroesophageal reflux disease with esophagitis, unspecified whether hemorrhage 11/09/2022     Priority: Medium    Diabetic polyneuropathy associated with type 2 diabetes mellitus (H) 06/08/2022     Priority: Medium    Tachycardia 06/08/2022     Priority: Medium    Diabetes mellitus, type 2 (H) 03/30/2022     Priority: Medium    Family history of type 1 diabetes mellitus 03/30/2022     Priority: Medium    Abnormal LFTs 03/21/2022     Priority: Medium    Cervical radiculopathy 04/06/2021     Priority: Medium    Digital mucinous cyst of toe of right foot 01/10/2020     Priority: Medium    Primary osteoarthritis of both knees 01/10/2020     Priority: Medium    Gastroesophageal reflux disease without esophagitis 01/10/2020     Priority: Medium    Mild obstructive sleep apnea 11/05/2018     Priority: Medium     Home Sleep Apnea Testing - 10/29/18: 215 lbs 0 oz: AHI 17.9/hr. Supine AHI 17.9/hr. Oxygen Pavel of 80%.  Baseline 93.7%.  Sp02 =< 88% for 15.6 minutes.        Class 1 obesity due to excess calories with body mass index (BMI) of 31.0 to 31.9 in adult, unspecified whether serious comorbidity  present 10/15/2018     Priority: Medium    Elevated liver enzymes 09/07/2018     Priority: Medium    Hyperlipidemia LDL goal <160 09/07/2018     Priority: Medium    CARDIOVASCULAR SCREENING; LDL GOAL LESS THAN 160 08/27/2018     Priority: Medium    Advanced directives, counseling/discussion 06/20/2017     Priority: Medium     Advance Care Planning 6/20/2017: ACP Review of Chart / Resources Provided:  Reviewed chart for advance care plan.  Farhat Tejada has an advance care plan which needs to be updated. Patient states presence of new/updated ACP document. Copy requested  Added by Leticia Almaraz           Thalassemia 11/13/2014     Priority: Medium            Medications (include herbals and vitamins):   Any Plavix use in the last 7 days? No     No current facility-administered medications for this encounter.     Current Outpatient Medications   Medication Sig    alcohol swab prep pads Use to swab area of injection/shelby as directed. Check blood glucose twice daily    aspirin (ASPIRIN LOW DOSE) 81 MG EC tablet Take 1 tablet (81 mg) by mouth daily    atorvastatin (LIPITOR) 40 MG tablet Take 1 tablet (40 mg) by mouth daily    bisacodyl (DULCOLAX) 5 MG EC tablet Take 2 tablets at 3 pm the day before your procedure. If your procedure is before 11 am, take 2 additional tablets at 11 pm. If your procedure is after 11 am, take 2 additional tablets at 6 am. For additional instructions refer to your colonoscopy prep instructions.    blood glucose (NO BRAND SPECIFIED) test strip Use to test blood sugar 1 times daily as needed or as directed.    fluconazole (DIFLUCAN) 150 MG tablet     lisinopril (ZESTRIL) 20 MG tablet Take 1 tablet (20 mg) by mouth daily    meclizine (ANTIVERT) 25 MG tablet Take 1 tablet (25 mg) by mouth 3 times daily as needed for dizziness or nausea    metFORMIN (GLUCOPHAGE XR) 500 MG 24 hr tablet Take 2 tablets (1,000 mg) by mouth 2 times daily (with meals)    omeprazole (PRILOSEC) 20 MG DR capsule TAKE 1  CAPSULE(20 MG) BY MOUTH DAILY AS NEEDED FOR HEART BURN    polyethylene glycol (GOLYTELY) 236 g suspension The night before the exam at 6 pm drink an 8-ounce glass every 15 minutes until the jug is half empty. If you arrive before 11 AM: Drink the other half of the Golytely jug at 11 PM night before procedure. If you arrive after 11 AM: Drink the other half of the Golytely jug at 6 AM day of procedure. For additional instructions refer to your colonoscopy prep instructions.    sildenafil (REVATIO) 20 MG tablet Take 20 mg by mouth daily as needed 30 min to 4 hours before intercourse. Never use with nitroglycerin, terazosin or doxazosin             Allergies:    No Known Allergies  Allergy to Latex? No  Allergy to tape?   No  Intolerances:             Physical Exam:   All vitals have been reviewed  No data found.  No intake/output data recorded.  Lungs:   No increased work of breathing, good air exchange, clear to auscultation bilaterally, no crackles or wheezing     Cardiovascular:   Normal apical impulse, regular rate and rhythm, normal S1 and S2, no S3 or S4, and no murmur noted             Lab / Radiology Results:            Anesthetic risk and/or ASA classification:       Nabor Barker MD

## 2023-12-20 ENCOUNTER — ANESTHESIA (OUTPATIENT)
Dept: GASTROENTEROLOGY | Facility: CLINIC | Age: 63
End: 2023-12-20
Payer: COMMERCIAL

## 2023-12-20 ENCOUNTER — HOSPITAL ENCOUNTER (OUTPATIENT)
Facility: CLINIC | Age: 63
Discharge: HOME | End: 2023-12-20
Attending: INTERNAL MEDICINE | Admitting: INTERNAL MEDICINE
Payer: COMMERCIAL

## 2023-12-20 VITALS
TEMPERATURE: 97.4 F | DIASTOLIC BLOOD PRESSURE: 88 MMHG | HEART RATE: 72 BPM | OXYGEN SATURATION: 98 % | RESPIRATION RATE: 16 BRPM | SYSTOLIC BLOOD PRESSURE: 123 MMHG

## 2023-12-20 LAB
COLONOSCOPY: NORMAL
UPPER GI ENDOSCOPY: NORMAL

## 2023-12-20 PROCEDURE — 88342 IMHCHEM/IMCYTCHM 1ST ANTB: CPT | Mod: 26

## 2023-12-20 PROCEDURE — 250N000009 HC RX 250: Performed by: NURSE ANESTHETIST, CERTIFIED REGISTERED

## 2023-12-20 PROCEDURE — 88342 IMHCHEM/IMCYTCHM 1ST ANTB: CPT | Mod: TC | Performed by: INTERNAL MEDICINE

## 2023-12-20 PROCEDURE — 250N000011 HC RX IP 250 OP 636: Performed by: NURSE ANESTHETIST, CERTIFIED REGISTERED

## 2023-12-20 PROCEDURE — 88305 TISSUE EXAM BY PATHOLOGIST: CPT | Mod: 26

## 2023-12-20 PROCEDURE — 370N000017 HC ANESTHESIA TECHNICAL FEE, PER MIN: Performed by: INTERNAL MEDICINE

## 2023-12-20 PROCEDURE — 258N000003 HC RX IP 258 OP 636: Performed by: NURSE ANESTHETIST, CERTIFIED REGISTERED

## 2023-12-20 PROCEDURE — 45380 COLONOSCOPY AND BIOPSY: CPT | Performed by: INTERNAL MEDICINE

## 2023-12-20 PROCEDURE — 43239 EGD BIOPSY SINGLE/MULTIPLE: CPT | Performed by: INTERNAL MEDICINE

## 2023-12-20 PROCEDURE — 45385 COLONOSCOPY W/LESION REMOVAL: CPT | Performed by: INTERNAL MEDICINE

## 2023-12-20 RX ORDER — SODIUM CHLORIDE, SODIUM LACTATE, POTASSIUM CHLORIDE, CALCIUM CHLORIDE 600; 310; 30; 20 MG/100ML; MG/100ML; MG/100ML; MG/100ML
INJECTION, SOLUTION INTRAVENOUS CONTINUOUS
Status: DISCONTINUED | OUTPATIENT
Start: 2023-12-20 | End: 2023-12-20 | Stop reason: HOSPADM

## 2023-12-20 RX ORDER — PROPOFOL 10 MG/ML
INJECTION, EMULSION INTRAVENOUS PRN
Status: DISCONTINUED | OUTPATIENT
Start: 2023-12-20 | End: 2023-12-20

## 2023-12-20 RX ORDER — LIDOCAINE HYDROCHLORIDE 20 MG/ML
INJECTION, SOLUTION INFILTRATION; PERINEURAL PRN
Status: DISCONTINUED | OUTPATIENT
Start: 2023-12-20 | End: 2023-12-20

## 2023-12-20 RX ORDER — PROPOFOL 10 MG/ML
INJECTION, EMULSION INTRAVENOUS CONTINUOUS PRN
Status: DISCONTINUED | OUTPATIENT
Start: 2023-12-20 | End: 2023-12-20

## 2023-12-20 RX ORDER — LIDOCAINE 40 MG/G
CREAM TOPICAL
Status: DISCONTINUED | OUTPATIENT
Start: 2023-12-20 | End: 2023-12-20 | Stop reason: HOSPADM

## 2023-12-20 RX ADMIN — PROPOFOL 200 MCG/KG/MIN: 10 INJECTION, EMULSION INTRAVENOUS at 11:27

## 2023-12-20 RX ADMIN — LIDOCAINE HYDROCHLORIDE 50 MG: 20 INJECTION, SOLUTION INFILTRATION; PERINEURAL at 11:26

## 2023-12-20 RX ADMIN — SODIUM CHLORIDE, POTASSIUM CHLORIDE, SODIUM LACTATE AND CALCIUM CHLORIDE: 600; 310; 30; 20 INJECTION, SOLUTION INTRAVENOUS at 11:12

## 2023-12-20 RX ADMIN — LIDOCAINE HYDROCHLORIDE 0.1 ML: 10 INJECTION, SOLUTION EPIDURAL; INFILTRATION; INTRACAUDAL; PERINEURAL at 11:13

## 2023-12-20 RX ADMIN — PROPOFOL 130 MG: 10 INJECTION, EMULSION INTRAVENOUS at 11:27

## 2023-12-20 ASSESSMENT — ACTIVITIES OF DAILY LIVING (ADL): ADLS_ACUITY_SCORE: 35

## 2023-12-20 NOTE — ANESTHESIA CARE TRANSFER NOTE
Patient: Farhat Tejada    Procedure: Procedure(s):  ESOPHAGOGASTRODUODENOSCOPY, WITH BIOPSY  COLONOSCOPY, WITH POLYPECTOMY       Diagnosis: Iron deficiency anemia, unspecified iron deficiency anemia type [D50.9]  Diagnosis Additional Information: No value filed.    Anesthesia Type:   MAC     Note:    Oropharynx: oropharynx clear of all foreign objects  Level of Consciousness: drowsy  Oxygen Supplementation: room air    Independent Airway: airway patency satisfactory and stable  Dentition: dentition unchanged  Vital Signs Stable: post-procedure vital signs reviewed and stable  Report to RN Given: handoff report given  Patient transferred to: Phase II    Handoff Report: Identifed the Patient, Identified the Reponsible Provider, Reviewed the pertinent medical history, Discussed the surgical course, Reviewed Intra-OP anesthesia mangement and issues during anesthesia, Set expectations for post-procedure period and Allowed opportunity for questions and acknowledgement of understanding    Vitals:  Vitals Value Taken Time   /81 12/20/23 1159   Temp     Pulse 69 12/20/23 1159   Resp 14 12/20/23 1159   SpO2 100 % 12/20/23 1200   Vitals shown include unfiled device data.    Electronically Signed By: KEENAN Hidalgo CRNA  December 20, 2023  12:01 PM

## 2023-12-20 NOTE — DISCHARGE INSTRUCTIONS
[facility]    Home Care Following Endoscopy          Activity:  You have just undergone an endoscopic procedure usually performed with conscious sedation.  Do not work or operate machinery (including a car) for at least 12 hours.    I encourage you to walk and attempt to pass this air as soon as possible.    Diet:  Return to the diet you were on before your procedure but eat lightly for the first 12-24 hours.  Drink plenty of water.  Resume any regular medications unless otherwise advised by your physician.  Please begin any new medication prescribed as a result of your procedure as directed by your physician.   If you had any biopsy or polyp removed please refrain from aspirin or aspirin products for 2 days.  If on Coumadin please restart as instructed by your physician.   Pain:  You may take Tylenol as needed for pain.  Expected Recovery:  You can expect some mild abdominal fullness and/or discomfort due to the air used to inflate your intestinal tract. It is also normal to have a mild sore throat after upper endoscopy.    Call Your Physician if You Have:  After Upper Endoscopy:  Shoulder, back or chest pain.  Difficulty breathing or swallowing.  Vomiting blood.  After Colonoscopy:  Worsening persisting abdominal pain which is worse with activity.  Fevers (>101 degrees F), chills or shakes.  Passage of continued blood with bowel movements.     Any questions or concerns about your recovery, please call [phone] or after hours [facility] ([phone]) Nurse Advice Line.    Follow-up Care:  You did have polyps/biopsy tissue sample(s) removed.  The polyps/biopsy tissue sample(s) will be sent to pathology.    You should receive letter in your My Chart from Dr. Barker with your results within 1-2 weeks. If you do not participate in My Chart a physical letter will come in the mail in 2-3 weeks.  Please call if you have not received a notification of your results.  If asked to return to clinic  please make an appointment 1 week after your procedure.  Call [phone].

## 2023-12-20 NOTE — ANESTHESIA POSTPROCEDURE EVALUATION
Patient: Farhat Tejada    Procedure: Procedure(s):  ESOPHAGOGASTRODUODENOSCOPY, WITH BIOPSY  COLONOSCOPY, WITH POLYPECTOMY       Anesthesia Type:  MAC    Note:  Disposition: Outpatient   Postop Pain Control: Uneventful            Sign Out: Well controlled pain   PONV: No   Neuro/Psych: Uneventful            Sign Out: Acceptable/Baseline neuro status   Airway/Respiratory: Uneventful            Sign Out: Acceptable/Baseline resp. status   CV/Hemodynamics: Uneventful            Sign Out: Acceptable CV status; No obvious hypovolemia; No obvious fluid overload   Other NRE: NONE   DID A NON-ROUTINE EVENT OCCUR? No           Last vitals:  Vitals Value Taken Time   /88 12/20/23 1220   Temp     Pulse 70 12/20/23 1220   Resp 16 12/20/23 1220   SpO2 100 % 12/20/23 1223   Vitals shown include unfiled device data.    Electronically Signed By: KEENAN Hidalgo CRNA  December 20, 2023  1:03 PM

## 2023-12-20 NOTE — ANESTHESIA PREPROCEDURE EVALUATION
Anesthesia Pre-Procedure Evaluation    Patient: Farhat Tejada   MRN: [id number] : 1960        Procedure : Procedure(s):  Esophagoscopy, gastroscopy, duodenoscopy (EGD), combined  Colonoscopy          Past Medical History:   Diagnosis Date    Diabetes (H)     HLD (hyperlipidemia)     Obesity     Sleep apnea     does not like to use CPAP      Past Surgical History:   Procedure Laterality Date    CHOLECYSTECTOMY  2022    ENDOSCOPIC RETROGRADE CHOLANGIOPANCREATOGRAM N/A 3/22/2022    Procedure: ENDOSCOPIC RETROGRADE CHOLANGIOPANCREATOGRAPHY, BILIARY SPHINCTEROTOMY STONE EXTRACTION;  Surgeon: Farhat Rodriguez MD;  Location: [facility] OR    ESOPHAGOSCOPY, GASTROSCOPY, DUODENOSCOPY (EGD), COMBINED N/A 3/22/2022    Procedure: ESOPHAGOGASTRODUODENOSCOPY, WITH FINE NEEDLE ASPIRATION BIOPSY, WITH ENDOSCOPIC ULTRASOUND GUIDANCE;  Surgeon: Farhat Rodriguez MD;  Location: [facility] OR    LAPAROSCOPIC CHOLECYSTECTOMY N/A 3/23/2022    Procedure: CHOLECYSTECTOMY, LAPAROSCOPIC;  Surgeon: Branden Ponce DO;  Location: [facility] OR    VASECTOMY      WISDOM ST GUIDEWIRE        No Known Allergies   Social History     Tobacco Use    Smoking status: Never    Smokeless tobacco: Never   Substance Use Topics    Alcohol use: Yes      Wt Readings from Last 1 Encounters:   23 90.3 kg (199 lb)        Anesthesia Evaluation   Pt has had prior anesthetic. Type: General.    History of anesthetic complications   Sleep apnea, does not use CPAP.    ROS/MED HX  ENT/Pulmonary:     (+) sleep apnea, mild, doesn't use CPAP,                                     Neurologic:     (+)    peripheral neuropathy,                            Cardiovascular:     (+) Dyslipidemia hypertension- -   -  - -                                      METS/Exercise Tolerance:     Hematologic: Comments: Thalassemia    (+)      anemia,          Musculoskeletal: Comment: Cervical radiculopathy  (+)  arthritis,             GI/Hepatic: Comment: Fatty  "liver disease, nonalcoholic    (+) GERD,       bowel prep,  cholecystitis/cholelithiasis,   liver disease,       Renal/Genitourinary:       Endo:     (+)  type II DM,       Diabetic complications: neuropathy.      Obesity,       Psychiatric/Substance Use:  - neg psychiatric ROS     Infectious Disease:  - neg infectious disease ROS     Malignancy:  - neg malignancy ROS     Other:  - neg other ROS        Physical Exam    Airway        Mallampati: II   TM distance: > 3 FB   Neck ROM: full   Mouth opening: > 3 cm    Respiratory Devices and Support         Dental       (+) Minor Abnormalities - some fillings, tiny chips      Cardiovascular   cardiovascular exam normal       Rhythm and rate: regular and normal     Pulmonary   pulmonary exam normal        breath sounds clear to auscultation       OUTSIDE LABS:  CBC:   Lab Results   Component Value Date    WBC 6.9 09/20/2023    WBC 6.5 05/31/2023    HGB 13.0 (L) 09/20/2023    HGB 11.9 (L) 05/31/2023    HCT 42.2 09/20/2023    HCT 38.9 (L) 05/31/2023     09/20/2023     05/31/2023     BMP:   Lab Results   Component Value Date     12/13/2023     04/20/2023    POTASSIUM 5.2 12/13/2023    POTASSIUM 4.3 04/20/2023    CHLORIDE 102 12/13/2023    CHLORIDE 109 04/20/2023    CO2 24 12/13/2023    CO2 27 04/20/2023    BUN 15.3 12/13/2023    BUN 16 04/20/2023    CR 1.10 12/13/2023    CR 0.95 04/20/2023     (H) 12/13/2023     (H) 04/20/2023     COAGS: No results found for: \"PTT\", \"INR\", \"FIBR\"  POC: No results found for: \"BGM\", \"HCG\", \"HCGS\"  HEPATIC:   Lab Results   Component Value Date    ALBUMIN 4.4 04/20/2023    PROTTOTAL 7.6 04/20/2023    ALT 33 04/20/2023    AST 23 04/20/2023    ALKPHOS 58 04/20/2023    BILITOTAL 1.7 (H) 09/20/2023     OTHER:   Lab Results   Component Value Date    A1C 5.9 (H) 12/13/2023    KERRI 11.0 (H) 12/13/2023    PHOS 2.3 (L) 03/24/2022    MAG 2.1 03/24/2022    LIPASE 27 03/23/2022    TSH 2.24 04/20/2023       Anesthesia " "Plan    ASA Status:  3    NPO Status:  NPO Appropriate    Anesthesia Type: MAC.   Induction: Propofol.   Maintenance: TIVA.        Consents    Anesthesia Plan(s) and associated risks, benefits, and realistic alternatives discussed. Questions answered and patient/representative(s) expressed understanding.     - Discussed: Risks, Benefits and Alternatives for BOTH SEDATION and the PROCEDURE were discussed     - Discussed with:  Patient      - Extended Intubation/Ventilatory Support Discussed: No.      - Patient is DNR/DNI Status: No     Use of blood products discussed: No .     Postoperative Care            Comments:    Other Comments: I have discussed all the risks and benefits of the anesthetic with the patient and they wish to proceed.          H&P reviewed: Unable to attach H&P to encounter due to EHR limitations. H&P Update: appropriate H&P reviewed, patient examined. No interval changes since H&P (within 30 days).        KEENAN Hidalgo CRNA    I have reviewed the pertinent notes and labs in the chart from the past 30 days and (re)examined the patient.  Any updates or changes from those notes are reflected in this note.      # Hypercalcemia: Highest Ca = 11 mg/dL in last 30 days, will monitor as appropriate         # Overweight: Estimated body mass index is 28.55 kg/m  as calculated from the following:    Height as of 12/13/23: 1.778 m (5' 10\").    Weight as of 12/13/23: 90.3 kg (199 lb).      "

## 2023-12-20 NOTE — LETTER
January 2, 2024      Farhat Tejada  [address] [id number]        Dear ,    We are writing to inform you of your test results.    The stomach and small  bowel are non-concerning. A repeat exam in 5 yrs with the colon polyp that was removed.     Resulted Orders   Surgical Pathology Exam   Result Value Ref Range    Case Report       Surgical Pathology Report                         Case: [id number]                                  Authorizing Provider:  Nabor Barker MD        Collected:           12/20/2023 11:27 AM          Ordering Location:     [facility]          Received:            12/20/2023 12:23 PM                                 [facility]                                                          Pathologist:           An Rojas MD                                                           Specimens:   A) - Small Intestine, small intestine due to anemia                                                 B) - Stomach, Body, gastric biopsies r/o H. Pylori                                                  C) - Large Intestine, Colon, Transverse, transverse colon polyp                            Addendum       This addendum is issued to include findings of Helicobacter pylori immunoperoxidase stain performed on gastric biopsy (specimen B) for further evaluation, using appropriate controls.  A few H. pylori forms are identified, consistent HELICOBACTER PYLORI-ASSOCIATED CHRONIC ACTIVE GASTRITIS.  All previously reported findings remain unchanged.       Final Diagnosis       A.  Small bowel, biopsy:  - Negative for diagnostic pathologic alteration.    B.  Stomach, biopsy-  - Chronic active gastritis, moderate.  - Negative for intestinal metaplasia, gastric epithelial dysplasia, or malignancy.  - Pending Helicobacter pylori stain (see forthcoming addendum for results).  - Sampling includes: Gastric oxyntic mucosa.    C.  Transverse colon, polyp,  "biopsy/polypectomy:  - Tubular adenoma; negative for high-grade dysplasia or malignancy.       Clinical Information        Iron deficiency anemia.      Gross Description       A(1). Small Intestine, small intestine due to anemia:  The specimen is received in formalin, labeled with the patient's name, medical record number and other identifying information designated \"small intestine biopsy\". It consists of 4 tan soft tissue fragments, 0.3-0.4 cm.  Entirely submitted in 1 cassette.    B(2). Stomach, Body, gastric biopsies r/o H. Pylori:  The specimen is received in formalin, labeled with the patient's name, medical record number and other identifying information designated \"stomach, body biopsy\". It consists of 3 tan soft tissue fragments, 0.2 to 0.4 cm.  Entirely submitted in 1 cassette.    C(3). Large Intestine, Colon, Transverse, transverse colon polyp:  The specimen is received in formalin, labeled with the patient's name, medical record number and other identifying information designated \"transverse colon polyp\". It consists of a single 0.6 cm tan soft tissue fragment.  Inked black, sectioned, and entirely submitted in 1 cassette.  (Liss Messer [facility] Tech)      Microscopic Description       A, B, and C.  Microscopic examination is performed.        Performing Labs       The technical component of this testing was completed at [facility]      Case Images         If you have any questions or concerns, please call the clinic at the number listed above.       Sincerely,      Nabor Barker MD            "

## 2023-12-27 LAB
PATH REPORT.ADDENDUM SPEC: NORMAL
PATH REPORT.COMMENTS IMP SPEC: NORMAL
PATH REPORT.COMMENTS IMP SPEC: NORMAL
PATH REPORT.FINAL DX SPEC: NORMAL
PATH REPORT.GROSS SPEC: NORMAL
PATH REPORT.MICROSCOPIC SPEC OTHER STN: NORMAL
PATH REPORT.RELEVANT HX SPEC: NORMAL
PHOTO IMAGE: NORMAL

## 2024-02-07 ENCOUNTER — MYC REFILL (OUTPATIENT)
Dept: FAMILY MEDICINE | Facility: CLINIC | Age: 64
End: 2024-02-07
Payer: COMMERCIAL

## 2024-02-07 DIAGNOSIS — K21.00 GASTROESOPHAGEAL REFLUX DISEASE WITH ESOPHAGITIS, UNSPECIFIED WHETHER HEMORRHAGE: ICD-10-CM

## 2024-03-05 ENCOUNTER — OFFICE VISIT (OUTPATIENT)
Dept: UROLOGY | Facility: CLINIC | Age: 64
End: 2024-03-05
Attending: PHYSICIAN ASSISTANT
Payer: COMMERCIAL

## 2024-03-05 VITALS
SYSTOLIC BLOOD PRESSURE: 127 MMHG | TEMPERATURE: 96.9 F | OXYGEN SATURATION: 99 % | BODY MASS INDEX: 27.33 KG/M2 | HEIGHT: 72 IN | RESPIRATION RATE: 16 BRPM | WEIGHT: 201.8 LBS | HEART RATE: 79 BPM | DIASTOLIC BLOOD PRESSURE: 91 MMHG

## 2024-03-05 DIAGNOSIS — R97.20 ELEVATED PROSTATE SPECIFIC ANTIGEN (PSA): ICD-10-CM

## 2024-03-05 DIAGNOSIS — R35.0 URINARY FREQUENCY: ICD-10-CM

## 2024-03-05 DIAGNOSIS — N52.9 ERECTILE DYSFUNCTION, UNSPECIFIED ERECTILE DYSFUNCTION TYPE: ICD-10-CM

## 2024-03-05 LAB
ALBUMIN UR-MCNC: NEGATIVE MG/DL
APPEARANCE UR: CLEAR
BACTERIA #/AREA URNS HPF: ABNORMAL /HPF
BILIRUB UR QL STRIP: NEGATIVE
COLOR UR AUTO: YELLOW
GLUCOSE UR STRIP-MCNC: NEGATIVE MG/DL
HGB UR QL STRIP: NEGATIVE
KETONES UR STRIP-MCNC: NEGATIVE MG/DL
LEUKOCYTE ESTERASE UR QL STRIP: NEGATIVE
MUCOUS THREADS #/AREA URNS LPF: PRESENT /LPF
NITRATE UR QL: NEGATIVE
PH UR STRIP: 6.5 [PH] (ref 5–7)
PSA SERPL DL<=0.01 NG/ML-MCNC: 0.88 NG/ML (ref 0–4.5)
RBC #/AREA URNS AUTO: ABNORMAL /HPF
SP GR UR STRIP: 1.01 (ref 1–1.03)
SQUAMOUS #/AREA URNS AUTO: ABNORMAL /LPF
UROBILINOGEN UR STRIP-ACNC: 0.2 E.U./DL
WBC #/AREA URNS AUTO: ABNORMAL /HPF

## 2024-03-05 PROCEDURE — 81001 URINALYSIS AUTO W/SCOPE: CPT | Performed by: STUDENT IN AN ORGANIZED HEALTH CARE EDUCATION/TRAINING PROGRAM

## 2024-03-05 PROCEDURE — 36415 COLL VENOUS BLD VENIPUNCTURE: CPT | Performed by: STUDENT IN AN ORGANIZED HEALTH CARE EDUCATION/TRAINING PROGRAM

## 2024-03-05 PROCEDURE — G0103 PSA SCREENING: HCPCS | Performed by: STUDENT IN AN ORGANIZED HEALTH CARE EDUCATION/TRAINING PROGRAM

## 2024-03-05 PROCEDURE — 99204 OFFICE O/P NEW MOD 45 MIN: CPT | Performed by: STUDENT IN AN ORGANIZED HEALTH CARE EDUCATION/TRAINING PROGRAM

## 2024-03-05 ASSESSMENT — PAIN SCALES - GENERAL: PAINLEVEL: NO PAIN (0)

## 2024-03-05 NOTE — PROGRESS NOTES
UROLOGY OUTPATIENT VISIT      Chief Complaint:   Elevated psa      Synopsis   Farhat Tejada is a 63 year old male seen in consultation from [facility] regarding elevated PSA, erectile dysfunction, urinary frequency.      PMH: hypertension, diabetes, has obstructive sleep apnea  Has history of Beta - Thalassemia    Elevated PSA  - 1/10/2020 - 0.70  - 2023 - 13.30    No family history of prostate cancer    Urinary frequency  AUA symptom score  Incomplete Emptyin  Frequency:  3  Intermittency:  1  Urgency:  0 (not at all)  Weak Stream:  2  Strainin  Sleepin  Total: 10  QoL:Mixed     Erectile dysfunction  - was started on slidenafil 20 mg has not tried yet    IIEF questionnaire -   Erectile function = 10/30  Orgasmic function = 8/10  Sexual Desire = 8/10  [place] Satisfaction = 12/15  Overall satisfaction = 9/10    Prostate size 35 grams    BPH related history:  -- Urinary tract infections (once in )  -- Urinary tract stones  -- Gross hematuria  + Elevated PSA  -- History of Urinary retention  -- Chronic kidney disease  -- Prior BPH procedure  -- Prior Prostate biopsy   -- History of prostate cancer  -- Family history of BPH    Hx vasectomy    Imaging   I personally reviewed the CT scan and by my interpretation it demonstrates a prostate around 35 grams.     Most Recent 3 CBC's:  Recent Labs   Lab Test 23  1039 23  1440 23  0902   WBC 6.9 6.5 6.4   HGB 13.0* 11.9* 12.3*   MCV 63* 61* 61*    261 275       Medical Comorbidities      Past Medical History:   Diagnosis Date    Diabetes (H)     HLD (hyperlipidemia)     Obesity     Sleep apnea     does not like to use CPAP               Medications     Current Outpatient Medications   Medication    aspirin (ASPIRIN LOW DOSE) 81 MG EC tablet    atorvastatin (LIPITOR) 40 MG tablet    Ferrous Sulfate (IRON PO)    lisinopril (ZESTRIL) 20 MG tablet    meclizine (ANTIVERT) 25 MG tablet    metFORMIN (GLUCOPHAGE XR) 500 MG 24 hr  tablet    omeprazole (PRILOSEC) 20 MG DR capsule    sildenafil (REVATIO) 20 MG tablet    alcohol swab prep pads    blood glucose (NO BRAND SPECIFIED) test strip     No current facility-administered medications for this visit.     A digital rectal exam was performed that showed a small prostate with no nodularity.         Assessment/Plan   63 year old year old person with elevated PSA, ED, urinary frequency    Elevated PSA  We discussed causes of elevated PSA which include enlarged prostate, infection or inflammation, prostate cancer, we discussed that the test there is risk of false positives.  I am surprised by the elevation of the PSA from 0.70 couple years ago to 13.  At the time he was having a lot of urinary frequency and potentially he had an infection.  We will start with a PSA recheck and if it still elevated then proceed with MRI of the prostate with or without biopsy.  Will also check a urine sample today.    #2 erectile dysfunction  The IIEF score reveals that he has primarily erectile dysfunction without any orgasmic or sexual desire issues.  He has been started on 20 mg sildenafil already.  I discussed that he should give this a try and if necessary we can increase the dose up to 100 mg daily as needed prior to intercourse.  Suspect this is vasculogenic erectile dysfunction given his history of diabetes and hypertension    #3 urinary frequency  Primarily at night.  improved from the time that he was referred.  Not too bothered by the symptoms that 2 times a night.  We did discuss lifestyle changes such as cutting back fluids about 4 hours before going to bed.  We recommended avoiding bladder irritants such as coffee tea carbonated beverages or spicy foods.      CC:  Helena Magaña    Problems:  4 -- one undiagnosed new problem with uncertain prognosis    Data Reviewed  Independent interpretation of a test performed by another physician/other qualified health care professional (not separately  reported) CT A/P     Level of risk:  2 -- Minimal risk of morbidity

## 2024-04-20 ENCOUNTER — HEALTH MAINTENANCE LETTER (OUTPATIENT)
Age: 64
End: 2024-04-20

## 2024-05-28 ENCOUNTER — MYC REFILL (OUTPATIENT)
Dept: FAMILY MEDICINE | Facility: CLINIC | Age: 64
End: 2024-05-28
Payer: COMMERCIAL

## 2024-05-28 DIAGNOSIS — E11.65 TYPE 2 DIABETES MELLITUS WITH HYPERGLYCEMIA, WITHOUT LONG-TERM CURRENT USE OF INSULIN (H): ICD-10-CM

## 2024-05-28 DIAGNOSIS — N52.9 ERECTILE DYSFUNCTION, UNSPECIFIED ERECTILE DYSFUNCTION TYPE: ICD-10-CM

## 2024-05-28 RX ORDER — ASPIRIN 81 MG/1
81 TABLET ORAL DAILY
Qty: 90 TABLET | Refills: 0 | Status: SHIPPED | OUTPATIENT
Start: 2024-05-28 | End: 2024-07-10

## 2024-05-29 RX ORDER — SILDENAFIL CITRATE 20 MG/1
TABLET ORAL
Qty: 20 TABLET | Refills: 1 | Status: SHIPPED | OUTPATIENT
Start: 2024-05-29 | End: 2024-07-10

## 2024-06-17 PROBLEM — Z71.89 ADVANCED DIRECTIVES, COUNSELING/DISCUSSION: Status: RESOLVED | Noted: 2017-06-20 | Resolved: 2024-06-17

## 2024-06-29 ENCOUNTER — HEALTH MAINTENANCE LETTER (OUTPATIENT)
Age: 64
End: 2024-06-29

## 2024-07-08 ENCOUNTER — MYC REFILL (OUTPATIENT)
Dept: FAMILY MEDICINE | Facility: CLINIC | Age: 64
End: 2024-07-08
Payer: COMMERCIAL

## 2024-07-08 DIAGNOSIS — E11.65 TYPE 2 DIABETES MELLITUS WITH HYPERGLYCEMIA, WITHOUT LONG-TERM CURRENT USE OF INSULIN (H): ICD-10-CM

## 2024-07-08 RX ORDER — ATORVASTATIN CALCIUM 40 MG/1
40 TABLET, FILM COATED ORAL DAILY
Qty: 90 TABLET | Refills: 0 | Status: SHIPPED | OUTPATIENT
Start: 2024-07-08 | End: 2024-07-10

## 2024-07-09 SDOH — HEALTH STABILITY: PHYSICAL HEALTH: ON AVERAGE, HOW MANY DAYS PER WEEK DO YOU ENGAGE IN MODERATE TO STRENUOUS EXERCISE (LIKE A BRISK WALK)?: 3 DAYS

## 2024-07-09 SDOH — HEALTH STABILITY: PHYSICAL HEALTH: ON AVERAGE, HOW MANY MINUTES DO YOU ENGAGE IN EXERCISE AT THIS LEVEL?: 40 MIN

## 2024-07-09 ASSESSMENT — SOCIAL DETERMINANTS OF HEALTH (SDOH): HOW OFTEN DO YOU GET TOGETHER WITH FRIENDS OR RELATIVES?: ONCE A WEEK

## 2024-07-10 ENCOUNTER — OFFICE VISIT (OUTPATIENT)
Dept: FAMILY MEDICINE | Facility: CLINIC | Age: 64
End: 2024-07-10
Payer: COMMERCIAL

## 2024-07-10 ENCOUNTER — ANCILLARY PROCEDURE (OUTPATIENT)
Dept: GENERAL RADIOLOGY | Facility: CLINIC | Age: 64
End: 2024-07-10
Attending: PHYSICIAN ASSISTANT
Payer: COMMERCIAL

## 2024-07-10 VITALS
OXYGEN SATURATION: 98 % | WEIGHT: 202 LBS | DIASTOLIC BLOOD PRESSURE: 82 MMHG | HEART RATE: 69 BPM | TEMPERATURE: 96.9 F | SYSTOLIC BLOOD PRESSURE: 132 MMHG | RESPIRATION RATE: 16 BRPM | BODY MASS INDEX: 27.78 KG/M2

## 2024-07-10 DIAGNOSIS — E11.42 DIABETIC POLYNEUROPATHY ASSOCIATED WITH TYPE 2 DIABETES MELLITUS (H): ICD-10-CM

## 2024-07-10 DIAGNOSIS — H91.93 DECREASED HEARING OF BOTH EARS: ICD-10-CM

## 2024-07-10 DIAGNOSIS — N52.9 ERECTILE DYSFUNCTION, UNSPECIFIED ERECTILE DYSFUNCTION TYPE: ICD-10-CM

## 2024-07-10 DIAGNOSIS — K21.00 GASTROESOPHAGEAL REFLUX DISEASE WITH ESOPHAGITIS, UNSPECIFIED WHETHER HEMORRHAGE: ICD-10-CM

## 2024-07-10 DIAGNOSIS — Z00.00 ROUTINE GENERAL MEDICAL EXAMINATION AT A HEALTH CARE FACILITY: Primary | ICD-10-CM

## 2024-07-10 DIAGNOSIS — M17.0 PRIMARY OSTEOARTHRITIS OF BOTH KNEES: ICD-10-CM

## 2024-07-10 DIAGNOSIS — I10 HYPERTENSION GOAL BP (BLOOD PRESSURE) < 140/90: ICD-10-CM

## 2024-07-10 DIAGNOSIS — R74.8 ELEVATED LIVER ENZYMES: ICD-10-CM

## 2024-07-10 DIAGNOSIS — E11.65 TYPE 2 DIABETES MELLITUS WITH HYPERGLYCEMIA, WITHOUT LONG-TERM CURRENT USE OF INSULIN (H): ICD-10-CM

## 2024-07-10 DIAGNOSIS — D50.9 IRON DEFICIENCY ANEMIA, UNSPECIFIED IRON DEFICIENCY ANEMIA TYPE: ICD-10-CM

## 2024-07-10 LAB
ALBUMIN SERPL BCG-MCNC: 4.7 G/DL (ref 3.5–5.2)
ALP SERPL-CCNC: 60 U/L (ref 40–150)
ALT SERPL W P-5'-P-CCNC: 46 U/L (ref 0–70)
ANION GAP SERPL CALCULATED.3IONS-SCNC: 10 MMOL/L (ref 7–15)
AST SERPL W P-5'-P-CCNC: 42 U/L (ref 0–45)
BILIRUB SERPL-MCNC: 2.4 MG/DL
BUN SERPL-MCNC: 15.3 MG/DL (ref 8–23)
CALCIUM SERPL-MCNC: 10.1 MG/DL (ref 8.8–10.2)
CHLORIDE SERPL-SCNC: 104 MMOL/L (ref 98–107)
CHOLEST SERPL-MCNC: 124 MG/DL
CREAT SERPL-MCNC: 1.08 MG/DL (ref 0.67–1.17)
DEPRECATED HCO3 PLAS-SCNC: 25 MMOL/L (ref 22–29)
EGFRCR SERPLBLD CKD-EPI 2021: 77 ML/MIN/1.73M2
ERYTHROCYTE [DISTWIDTH] IN BLOOD BY AUTOMATED COUNT: 18.5 % (ref 10–15)
FASTING STATUS PATIENT QL REPORTED: YES
FASTING STATUS PATIENT QL REPORTED: YES
FERRITIN SERPL-MCNC: 59 NG/ML (ref 31–409)
GLUCOSE SERPL-MCNC: 116 MG/DL (ref 70–99)
HBA1C MFR BLD: 5.8 % (ref 0–5.6)
HCT VFR BLD AUTO: 41.3 % (ref 40–53)
HDLC SERPL-MCNC: 54 MG/DL
HGB BLD-MCNC: 12.7 G/DL (ref 13.3–17.7)
IRON BINDING CAPACITY (ROCHE): 342 UG/DL (ref 240–430)
IRON SATN MFR SERPL: 27 % (ref 15–46)
IRON SERPL-MCNC: 92 UG/DL (ref 61–157)
LDLC SERPL CALC-MCNC: 52 MG/DL
MCH RBC QN AUTO: 20 PG (ref 26.5–33)
MCHC RBC AUTO-ENTMCNC: 30.8 G/DL (ref 31.5–36.5)
MCV RBC AUTO: 65 FL (ref 78–100)
NONHDLC SERPL-MCNC: 70 MG/DL
PLATELET # BLD AUTO: 265 10E3/UL (ref 150–450)
POTASSIUM SERPL-SCNC: 4.9 MMOL/L (ref 3.4–5.3)
PROT SERPL-MCNC: 7.4 G/DL (ref 6.4–8.3)
RBC # BLD AUTO: 6.34 10E6/UL (ref 4.4–5.9)
SODIUM SERPL-SCNC: 139 MMOL/L (ref 135–145)
TRIGL SERPL-MCNC: 92 MG/DL
VIT B12 SERPL-MCNC: 404 PG/ML (ref 232–1245)
WBC # BLD AUTO: 6.2 10E3/UL (ref 4–11)

## 2024-07-10 PROCEDURE — 83036 HEMOGLOBIN GLYCOSYLATED A1C: CPT | Performed by: PHYSICIAN ASSISTANT

## 2024-07-10 PROCEDURE — 80053 COMPREHEN METABOLIC PANEL: CPT | Performed by: PHYSICIAN ASSISTANT

## 2024-07-10 PROCEDURE — 83540 ASSAY OF IRON: CPT | Performed by: PHYSICIAN ASSISTANT

## 2024-07-10 PROCEDURE — 99396 PREV VISIT EST AGE 40-64: CPT | Performed by: PHYSICIAN ASSISTANT

## 2024-07-10 PROCEDURE — 83550 IRON BINDING TEST: CPT | Performed by: PHYSICIAN ASSISTANT

## 2024-07-10 PROCEDURE — 99214 OFFICE O/P EST MOD 30 MIN: CPT | Mod: 25 | Performed by: PHYSICIAN ASSISTANT

## 2024-07-10 PROCEDURE — 82607 VITAMIN B-12: CPT | Performed by: PHYSICIAN ASSISTANT

## 2024-07-10 PROCEDURE — 82728 ASSAY OF FERRITIN: CPT | Performed by: PHYSICIAN ASSISTANT

## 2024-07-10 PROCEDURE — 80061 LIPID PANEL: CPT | Performed by: PHYSICIAN ASSISTANT

## 2024-07-10 PROCEDURE — 73565 X-RAY EXAM OF KNEES: CPT | Mod: TC | Performed by: RADIOLOGY

## 2024-07-10 PROCEDURE — 85027 COMPLETE CBC AUTOMATED: CPT | Performed by: PHYSICIAN ASSISTANT

## 2024-07-10 PROCEDURE — 36415 COLL VENOUS BLD VENIPUNCTURE: CPT | Performed by: PHYSICIAN ASSISTANT

## 2024-07-10 RX ORDER — SILDENAFIL CITRATE 20 MG/1
TABLET ORAL
Qty: 20 TABLET | Refills: 1 | Status: SHIPPED | OUTPATIENT
Start: 2024-07-10

## 2024-07-10 RX ORDER — LISINOPRIL 20 MG/1
20 TABLET ORAL DAILY
Qty: 90 TABLET | Refills: 1 | Status: SHIPPED | OUTPATIENT
Start: 2024-07-10

## 2024-07-10 RX ORDER — METFORMIN HCL 500 MG
TABLET, EXTENDED RELEASE 24 HR ORAL
Qty: 270 TABLET | Refills: 1 | Status: SHIPPED | OUTPATIENT
Start: 2024-07-10

## 2024-07-10 RX ORDER — ASPIRIN 81 MG/1
81 TABLET ORAL DAILY
Qty: 90 TABLET | Refills: 3 | Status: SHIPPED | OUTPATIENT
Start: 2024-07-10

## 2024-07-10 RX ORDER — ATORVASTATIN CALCIUM 40 MG/1
40 TABLET, FILM COATED ORAL DAILY
Qty: 90 TABLET | Refills: 1 | Status: SHIPPED | OUTPATIENT
Start: 2024-07-10 | End: 2024-10-01

## 2024-07-10 NOTE — PATIENT INSTRUCTIONS
Please go to the pharmacy for your RSV (respiratory syncytial virus)vaccine.  You will need 1 dose of the RSV vaccine, 1 time. The RSV vaccine can prevent lower respiratory tract disease caused by respiratory syncytial virus (RSV). RSV is a common respiratory virus that usually causes mild, cold-like symptoms. RSV can cause illness in people of all ages but may be especially serious for infants and older adults. Adults at highest risk for severe RSV disease include older adults, adults with chronic medical conditions such as heart or lung disease, weakened immune systems, or certain other underlying medical conditions, or who live in nursing homes or long-term care facilities    I also recommend pneumococcal 20 vaccine when you turn 65.     For the knees, I suggest topical arthritis creams (bengay, Australian dreams, tiger balm, etc).  May use ibuprofen as needed (sparingly with food).  Start physical therapy to help strengthen joint support.           Patient Education   Preventive Care Advice   This is general advice given by our system to help you stay healthy. However, your care team may have specific advice just for you. Please talk to your care team about your preventive care needs.  Nutrition  Eat 5 or more servings of fruits and vegetables each day.  Try wheat bread, brown rice and whole grain pasta (instead of white bread, rice, and pasta).  Get enough calcium and vitamin D. Check the label on foods and aim for 100% of the RDA (recommended daily allowance).  Lifestyle  Exercise at least 150 minutes each week  (30 minutes a day, 5 days a week).  Do muscle strengthening activities 2 days a week. These help control your weight and prevent disease.  No smoking.  Wear sunscreen to prevent skin cancer.  Have a dental exam and cleaning every 6 months.  Yearly exams  See your health care team every year to talk about:  Any changes in your health.  Any medicines your care team has prescribed.  Preventive care,  family planning, and ways to prevent chronic diseases.  Shots (vaccines)   HPV shots (up to age 26), if you've never had them before.  Hepatitis B shots (up to age 59), if you've never had them before.  COVID-19 shot: Get this shot when it's due.  Flu shot: Get a flu shot every year.  Tetanus shot: Get a tetanus shot every 10 years.  Pneumococcal, hepatitis A, and RSV shots: Ask your care team if you need these based on your risk.  Shingles shot (for age 50 and up)  General health tests  Diabetes screening:  Starting at age 35, Get screened for diabetes at least every 3 years.  If you are younger than age 35, ask your care team if you should be screened for diabetes.  Cholesterol test: At age 39, start having a cholesterol test every 5 years, or more often if advised.  Bone density scan (DEXA): At age 50, ask your care team if you should have this scan for osteoporosis (brittle bones).  Hepatitis C: Get tested at least once in your life.  STIs (sexually transmitted infections)  Before age 24: Ask your care team if you should be screened for STIs.  After age 24: Get screened for STIs if you're at risk. You are at risk for STIs (including HIV) if:  You are sexually active with more than one person.  You don't use condoms every time.  You or a partner was diagnosed with a sexually transmitted infection.  If you are at risk for HIV, ask about PrEP medicine to prevent HIV.  Get tested for HIV at least once in your life, whether you are at risk for HIV or not.  Cancer screening tests  Cervical cancer screening: If you have a cervix, begin getting regular cervical cancer screening tests starting at age 21.  Breast cancer scan (mammogram): If you've ever had breasts, begin having regular mammograms starting at age 40. This is a scan to check for breast cancer.  Colon cancer screening: It is important to start screening for colon cancer at age 45.  Have a colonoscopy test every 10 years (or more often if you're at risk) Or,  ask your provider about stool tests like a FIT test every year or Cologuard test every 3 years.  To learn more about your testing options, visit:   .  For help making a decision, visit:   https://bit.ly/ki27402.  Prostate cancer screening test: If you have a prostate, ask your care team if a prostate cancer screening test (PSA) at age 55 is right for you.  Lung cancer screening: If you are a current or former smoker ages 50 to 80, ask your care team if ongoing lung cancer screenings are right for you.  For informational purposes only. Not to replace the advice of your health care provider. Copyright   2023 [facility]. All rights reserved. Clinically reviewed by the [facility] Transitions Program. [employer] 210513 - REV 01/24.

## 2024-07-10 NOTE — PROGRESS NOTES
Preventive Care Visit  [facility] ASHLEE Magaña PA-C, Family Medicine  Jul 10, 2024      Assessment & Plan     Routine general medical examination at a health care facility      HEALTH CARE MAINTENANCE              Reviewed USPTF recommendations and anticipatory guidance.              See orders.     Recommend RSV shot (to be done at pharmacy) and pneumococcal 20 shot when he turns 59 (in 1 month).     Type 2 diabetes mellitus with hyperglycemia, without long-term current use of insulin (H)  Sugars stable.  Eye exam current. Will leave things as is.   - Vitamin B12  - Comprehensive metabolic panel (BMP + Alb, Alk Phos, ALT, AST, Total. Bili, TP)  - Hemoglobin A1c  - Lipid panel reflex to direct LDL Fasting  - aspirin (ASPIRIN LOW DOSE) 81 MG EC tablet; Take 1 tablet (81 mg) by mouth daily  - atorvastatin (LIPITOR) 40 MG tablet; Take 1 tablet (40 mg) by mouth daily  - lisinopril (ZESTRIL) 20 MG tablet; Take 1 tablet (20 mg) by mouth daily  - metFORMIN (GLUCOPHAGE XR) 500 MG 24 hr tablet; Take 1 tab by mouth in am and 2 tabs with dinner.    Diabetic polyneuropathy associated with type 2 diabetes mellitus (H)  No major changes.     Hypertension goal BP (blood pressure) < 140/90  Will leave as is.  If he develops more frequent lightheadedness, may consider cutting back on dose. Currently no regular symptoms of low blood pressures and home/clinic readings look good.  He is to get up slowly.   - lisinopril (ZESTRIL) 20 MG tablet; Take 1 tablet (20 mg) by mouth daily    Iron deficiency anemia, unspecified iron deficiency anemia type  Due to recheck level (has been now in the past).  Reviewed previous hematology note. Currently on an iron supplement.   - CBC with platelets  - Ferritin  - Iron and iron binding capacity    Primary osteoarthritis of both knees  Osteoarthritis of the knees    I discussed the pathophysiology of osteoarthritis and the progressive nature of this disease.  I spent time  "discussing lifestyle changes that may help with this, including weight loss (low weight bearing exercises ideal such as swimming/elliptical machine or biking), wearing comfortable/support athletic shoes, avoiding overuse of the knees.  I recommend he start ROM/strengthening exercises/ physical therapy.      Xray done today and normal.  Discussed that symptoms of arthritis often precede changes noted on imaging.     Patient instructions:   For the knees, I suggest topical arthritis creams (bengay, Australian dreams, tiger balm, etc).  May use ibuprofen as needed (sparingly with food).  Start physical therapy to help strengthen joint support.       - Physical Therapy  Referral; Future  - XR Knee AP Standing Bilateral    Decreased hearing of both ears  Hearing exam advised.   - Adult Audiology  Referral; Future    Gastroesophageal reflux disease with esophagitis, unspecified whether hemorrhage  Stable.   - omeprazole (PRILOSEC) 20 MG DR capsule; TAKE 1 CAPSULE(20 MG) BY MOUTH DAILY AS NEEDED FOR HEART BURN    Erectile dysfunction, unspecified erectile dysfunction type  Stable.   - sildenafil (REVATIO) 20 MG tablet; Take 20 mg by mouth daily as needed 30 min to 4 hours before intercourse. Never use with nitroglycerin, terazosin or doxazosin    Patient has been advised of split billing requirements and indicates understanding: Yes        BMI  Estimated body mass index is 27.78 kg/m  as calculated from the following:    Height as of 3/5/24: 1.816 m (5' 11.5\").    Weight as of this encounter: 91.6 kg (202 lb).   Weight management plan: Discussed healthy diet and exercise guidelines    Counseling  Appropriate preventive services were discussed with this patient, including applicable screening as appropriate for fall prevention, nutrition, physical activity, Tobacco-use cessation, weight loss and cognition.  Checklist reviewing preventive services available has been given to the patient.  Reviewed patient's " diet, addressing concerns and/or questions.   He is at risk for lack of exercise and has been provided with information to increase physical activity for the benefit of his well-being.           Rosangela Dougherty is a 63 year old, presenting for the following:  Physical        7/10/2024     7:49 AM   Additional Questions   Roomed by Ethel   Accompanied by Wife         7/10/2024     7:49 AM   Patient Reported Additional Medications   Patient reports taking the following new medications na        Health Care Directive  Patient does not have a Health Care Directive or Living Will: Discussed advance care planning with patient; however, patient declined at this time.    HPI      Patient with history of Diabetes and Hypertension arrived for Annual Physical.    **Labs collected upon arrival.     Patient would also like noted that his Bilateral Knees/Hips have been bugging him x 2-3 years.  Worse in the am when he wakes up and sometimes throughout the day.  When he gets up from sitting, the pain is present and lessens as he goes.  Left is more bothersome.  No radiation.    No injury.    He does feel some instability at times (buckling feeling, more on left).    No concerns with stairs.    No meds tried yet.   Pain occurs most days, will be about a 1-2/10.      Does c/o mild hearing loss noticeable over the years.  No major changes.  No recent hearing evaluation.  Wife states she notices hearing loss.     Has some mild dizziness at times (every 2-3 weeks).  No falls.  No LOC.  Mainly when getting up from sitting.   Monitors home BP's and they have been stable.   He does take iron daily.    Diabetes Follow-up    How often are you checking your blood sugar? A few times a week  What time of day are you checking your blood sugars (select all that apply)?  Before meals  Have you had any blood sugars above 200?  No  Have you had any blood sugars below 70?  No  What symptoms do you notice when your blood sugar is low?  None  What  concerns do you have today about your diabetes? None   Do you have any of these symptoms? (Select all that apply)  Numbness in feet    Taking 1 metformin in am and 2 in pm.  Doing well.  Sugars were running a little higher with 1 in am and 1 in pm.         BP Readings from Last 2 Encounters:   03/05/24 (!) 127/91   12/20/23 123/88     Hemoglobin A1C (%)   Date Value   12/13/2023 5.9 (H)   04/20/2023 6.0 (H)   08/23/2019 6.0 (H)   02/11/2019 6.1 (H)     LDL Cholesterol Calculated (mg/dL)   Date Value   04/20/2023 31   03/30/2022 119 (H)   01/10/2020 107 (H)   09/05/2018 129 (H)             Hypertension Follow-up    Do you check your blood pressure regularly outside of the clinic? Yes   Are you following a low salt diet? No  Are your blood pressures ever more than 140 on the top number (systolic) OR more   than 90 on the bottom number (diastolic), for example 140/90? Yes - Elevated every once in a while         7/9/2024   General Health   How would you rate your overall physical health? Good   Feel stress (tense, anxious, or unable to sleep) To some extent      (!) STRESS CONCERN      7/9/2024   Nutrition   Three or more servings of calcium each day? Yes   Diet: Diabetic   How many servings of fruit and vegetables per day? (!) 2-3   How many sweetened beverages each day? 0-1            7/9/2024   Exercise   Days per week of moderate/strenous exercise 3 days   Average minutes spent exercising at this level 40 min            7/9/2024   Social Factors   Frequency of gathering with friends or relatives Once a week   Worry food won't last until get money to buy more No   Food not last or not have enough money for food? No   Do you have housing? (Housing is defined as stable permanent housing and does not include staying ouside in a car, in a tent, in an abandoned building, in an overnight shelter, or couch-surfing.) Yes   Are you worried about losing your housing? No   Lack of transportation? No   Unable to get utilities  (heat,electricity)? No            7/9/2024   Fall Risk   Fallen 2 or more times in the past year? No   Trouble with walking or balance? No             7/9/2024   Dental   Dentist two times every year? Yes            7/9/2024   TB Screening   Were you born outside of the [place]? Yes                  7/9/2024   Substance Use   Alcohol more than 3/day or more than 7/wk No   Do you use any other substances recreationally? No        Social History     Tobacco Use    Smoking status: Never    Smokeless tobacco: Never   Vaping Use    Vaping status: Never Used   Substance Use Topics    Alcohol use: Yes     Comment: occasional    Drug use: No           7/9/2024   STI Screening   New sexual partner(s) since last STI/HIV test? No      Last PSA:   PSA   Date Value Ref Range Status   01/10/2020 0.70 0 - 4 ug/L Final     Comment:     Assay Method:  Chemiluminescence using Siemens Vista analyzer     Prostate Specific Antigen Screen   Date Value Ref Range Status   03/05/2024 0.88 0.00 - 4.50 ng/mL Final     ASCVD Risk   The ASCVD Risk score (Jordon VELEZ, et al., 2019) failed to calculate for the following reasons:    The valid total cholesterol range is 130 to 320 mg/dL           Reviewed and updated as needed this visit by Provider                    Past Medical History:   Diagnosis Date    Diabetes (H)     HLD (hyperlipidemia)     Obesity     Sleep apnea     does not like to use CPAP     Past Surgical History:   Procedure Laterality Date    CHOLECYSTECTOMY  03/23/2022    COLONOSCOPY N/A 12/20/2023    Procedure: COLONOSCOPY, WITH POLYPECTOMY;  Surgeon: Nabor Barker MD;  Location: [facility]    ENDOSCOPIC RETROGRADE CHOLANGIOPANCREATOGRAM N/A 03/22/2022    Procedure: ENDOSCOPIC RETROGRADE CHOLANGIOPANCREATOGRAPHY, BILIARY SPHINCTEROTOMY STONE EXTRACTION;  Surgeon: Farhat Rodriguez MD;  Location: [facility] OR    ESOPHAGOSCOPY, GASTROSCOPY, DUODENOSCOPY (EGD), COMBINED N/A 03/22/2022    Procedure: ESOPHAGOGASTRODUODENOSCOPY,  "WITH FINE NEEDLE ASPIRATION BIOPSY, WITH ENDOSCOPIC ULTRASOUND GUIDANCE;  Surgeon: Farhat Rodriguez MD;  [place] [facility] [place]    ESOPHAGOSCOPY, GASTROSCOPY, DUODENOSCOPY (EGD), COMBINED N/A 12/20/2023    Procedure: ESOPHAGOGASTRODUODENOSCOPY, WITH BIOPSY;  Surgeon: Nabor Barker MD;  Location: [facility]    LAPAROSCOPIC CHOLECYSTECTOMY N/A 03/23/2022    Procedure: CHOLECYSTECTOMY, LAPAROSCOPIC;  Surgeon: Branden Ponce DO;  [place] [facility] [place]    VASECTOMY      VASECTOMY  1999    [facility]       Lab work is in process      Review of Systems  Constitutional, neuro, ENT, endocrine, pulmonary, cardiac, gastrointestinal, genitourinary, musculoskeletal, integument and psychiatric systems are negative, except as otherwise noted.     Objective    Exam  There were no vitals taken for this visit.   Estimated body mass index is 27.75 kg/m  as calculated from the following:    Height as of 3/5/24: 1.816 m (5' 11.5\").    Weight as of 3/5/24: 91.5 kg (201 lb 12.8 oz).    Physical Exam  GENERAL: alert and no distress  EYES: Eyes grossly normal to inspection, PERRL and conjunctivae and sclerae normal  HENT: ear canals and TM's normal, nose and mouth without ulcers or lesions  NECK: no adenopathy, no asymmetry, masses, or scars  RESP: lungs clear to auscultation - no rales, rhonchi or wheezes  CV: regular rate and rhythm, normal S1 S2, no S3 or S4, no murmur, click or rub, no peripheral edema  ABDOMEN: soft, nontender, no hepatosplenomegaly, no masses and bowel sounds normal  MS: no gross musculoskeletal defects noted, no edema  SKIN: no suspicious lesions or rashes  NEURO: Normal strength and tone, mentation intact and speech normal  PSYCH: mentation appears normal, affect normal/bright    GAIT: NORMAL, duck walk normal  Dorsalis Pedis pulses intact bilaterally.  MUSCULOSKELETAL:  RIGHT and LEFT KNEE   Inspection: AP/lateral alignment normal, No effusion  Tender: none  Active Range of Motion: full flexion, " full extension, crepitus noted.   Strength: full              Signed Electronically by: Helena Magaña PA-C

## 2024-07-10 NOTE — LETTER
July 29, 2024      Farhat [place] [facility] [id number]        Dear ,    We are writing to inform you of your test results.    One of your liver tests (the bilirubin) is increasing compared to the last few years (the rest of your liver enzymes are normal).  You had an ultrasound of your liver back on 12/16/22 which showed hepatic steatosis (fatty liver disease).  I suggest we recheck this ultrasound to ensure it has not advanced.  Please call [facility] Scheduling at [phone]  to set up the US (no rush).        I suggest limiting alcohol and pain medications as much as possible.  Continue to keep the sugars well controlled.     Iron and hemoglobin levels are normal.     Let's recheck non-fasting labs in 2 months.    Resulted Orders   Comprehensive metabolic panel (BMP + Alb, Alk Phos, ALT, AST, Total. Bili, TP)   Result Value Ref Range    Sodium 139 135 - 145 mmol/L    Potassium 4.9 3.4 - 5.3 mmol/L    Carbon Dioxide (CO2) 25 22 - 29 mmol/L    Anion Gap 10 7 - 15 mmol/L    Urea Nitrogen 15.3 8.0 - 23.0 mg/dL    Creatinine 1.08 0.67 - 1.17 mg/dL    GFR Estimate 77 >60 mL/min/1.73m2      Comment:      eGFR calculated using 2021 CKD-EPI equation.    Calcium 10.1 8.8 - 10.2 mg/dL    Chloride 104 98 - 107 mmol/L    Glucose 116 (H) 70 - 99 mg/dL    Alkaline Phosphatase 60 40 - 150 U/L    AST 42 0 - 45 U/L      Comment:      Reference intervals for this test were updated on 6/12/2023 to more accurately reflect our healthy population. There may be differences in the flagging of prior results with similar values performed with this method. Interpretation of those prior results can be made in the context of the updated reference intervals.    ALT 46 0 - 70 U/L      Comment:      Reference intervals for this test were updated on 6/12/2023 to more accurately reflect our healthy population. There may be differences in the flagging of prior results with similar values performed with  this method. Interpretation of those prior results can be made in the context of the updated reference intervals.      Protein Total 7.4 6.4 - 8.3 g/dL    Albumin 4.7 3.5 - 5.2 g/dL    Bilirubin Total 2.4 (H) <=1.2 mg/dL    Patient Fasting > 8hrs? Yes    Hemoglobin A1c   Result Value Ref Range    Hemoglobin A1C 5.8 (H) 0.0 - 5.6 %      Comment:      Normal <5.7%   Prediabetes 5.7-6.4%    Diabetes 6.5% or higher     Note: Adopted from ADA consensus guidelines.   Lipid panel reflex to direct LDL Fasting   Result Value Ref Range    Cholesterol 124 <200 mg/dL    Triglycerides 92 <150 mg/dL    Direct Measure HDL 54 >=40 mg/dL    LDL Cholesterol Calculated 52 <=100 mg/dL    Non HDL Cholesterol 70 <130 mg/dL    Patient Fasting > 8hrs? Yes     Narrative    Cholesterol  Desirable:  <200 mg/dL    Triglycerides  Normal:  Less than 150 mg/dL  Borderline High:  150-199 mg/dL  High:  200-499 mg/dL  Very High:  Greater than or equal to 500 mg/dL    Direct Measure HDL  Female:  Greater than or equal to 50 mg/dL   Male:  Greater than or equal to 40 mg/dL    LDL Cholesterol  Desirable:  <100mg/dL  Above Desirable:  100-129 mg/dL   Borderline High:  130-159 mg/dL   High:  160-189 mg/dL   Very High:  >= 190 mg/dL    Non HDL Cholesterol  Desirable:  130 mg/dL  Above Desirable:  130-159 mg/dL  Borderline High:  160-189 mg/dL  High:  190-219 mg/dL  Very High:  Greater than or equal to 220 mg/dL   CBC with platelets   Result Value Ref Range    WBC Count 6.2 4.0 - 11.0 10e3/uL    RBC Count 6.34 (H) 4.40 - 5.90 10e6/uL    Hemoglobin 12.7 (L) 13.3 - 17.7 g/dL    Hematocrit 41.3 40.0 - 53.0 %    MCV 65 (L) 78 - 100 fL    MCH 20.0 (L) 26.5 - 33.0 pg    MCHC 30.8 (L) 31.5 - 36.5 g/dL    RDW 18.5 (H) 10.0 - 15.0 %    Platelet Count 265 150 - 450 10e3/uL   Ferritin   Result Value Ref Range    Ferritin 59 31 - 409 ng/mL   Iron and iron binding capacity   Result Value Ref Range    Iron 92 61 - 157 ug/dL    Iron Binding Capacity 342 240 - 430 ug/dL     Iron Sat Index 27 15 - 46 %       If you have any questions or concerns, please call the clinic at the number listed above.       Sincerely,      Helnea Magaña PA-C

## 2024-07-18 ASSESSMENT — ACTIVITIES OF DAILY LIVING (ADL)
STAND: ACTIVITY IS MINIMALLY DIFFICULT
HOW_WOULD_YOU_RATE_THE_OVERALL_FUNCTION_OF_YOUR_KNEE_DURING_YOUR_USUAL_DAILY_ACTIVITIES?: NEARLY NORMAL
GIVING WAY, BUCKLING OR SHIFTING OF KNEE: THE SYMPTOM AFFECTS MY ACTIVITY MODERATELY
SIT WITH YOUR KNEE BENT: ACTIVITY IS NOT DIFFICULT
PAIN: THE SYMPTOM AFFECTS MY ACTIVITY SLIGHTLY
RISE FROM A CHAIR: ACTIVITY IS MINIMALLY DIFFICULT
SWELLING: I DO NOT HAVE THE SYMPTOM
STIFFNESS: I DO NOT HAVE THE SYMPTOM
GO DOWN STAIRS: ACTIVITY IS SOMEWHAT DIFFICULT
WEAKNESS: I DO NOT HAVE THE SYMPTOM
STAND: ACTIVITY IS MINIMALLY DIFFICULT
PLEASE_INDICATE_YOR_PRIMARY_REASON_FOR_REFERRAL_TO_THERAPY:: KNEE
GO UP STAIRS: ACTIVITY IS MINIMALLY DIFFICULT
GIVING WAY, BUCKLING OR SHIFTING OF KNEE: THE SYMPTOM AFFECTS MY ACTIVITY MODERATELY
RAW_SCORE: 58
SQUAT: ACTIVITY IS NOT DIFFICULT
WEAKNESS: I DO NOT HAVE THE SYMPTOM
HOW_WOULD_YOU_RATE_THE_CURRENT_FUNCTION_OF_YOUR_KNEE_DURING_YOUR_USUAL_DAILY_ACTIVITIES_ON_A_SCALE_FROM_0_TO_100_WITH_100_BEING_YOUR_LEVEL_OF_KNEE_FUNCTION_PRIOR_TO_YOUR_INJURY_AND_0_BEING_THE_INABILITY_TO_PERFORM_ANY_OF_YOUR_USUAL_DAILY_ACTIVITIES?: 90
LIMPING: I DO NOT HAVE THE SYMPTOM
KNEE_ACTIVITY_OF_DAILY_LIVING_SUM: 58
AS_A_RESULT_OF_YOUR_KNEE_INJURY,_HOW_WOULD_YOU_RATE_YOUR_CURRENT_LEVEL_OF_DAILY_ACTIVITY?: NEARLY NORMAL
LIMPING: I DO NOT HAVE THE SYMPTOM
KNEEL ON THE FRONT OF YOUR KNEE: ACTIVITY IS MINIMALLY DIFFICULT
STIFFNESS: I DO NOT HAVE THE SYMPTOM
WALK: ACTIVITY IS MINIMALLY DIFFICULT
HOW_WOULD_YOU_RATE_THE_CURRENT_FUNCTION_OF_YOUR_KNEE_DURING_YOUR_USUAL_DAILY_ACTIVITIES_ON_A_SCALE_FROM_0_TO_100_WITH_100_BEING_YOUR_LEVEL_OF_KNEE_FUNCTION_PRIOR_TO_YOUR_INJURY_AND_0_BEING_THE_INABILITY_TO_PERFORM_ANY_OF_YOUR_USUAL_DAILY_ACTIVITIES?: 90
AS_A_RESULT_OF_YOUR_KNEE_INJURY,_HOW_WOULD_YOU_RATE_YOUR_CURRENT_LEVEL_OF_DAILY_ACTIVITY?: NEARLY NORMAL
KNEE_ACTIVITY_OF_DAILY_LIVING_SCORE: 82.86
GO DOWN STAIRS: ACTIVITY IS SOMEWHAT DIFFICULT
PAIN: THE SYMPTOM AFFECTS MY ACTIVITY SLIGHTLY
HOW_WOULD_YOU_RATE_THE_OVERALL_FUNCTION_OF_YOUR_KNEE_DURING_YOUR_USUAL_DAILY_ACTIVITIES?: NEARLY NORMAL
SIT WITH YOUR KNEE BENT: ACTIVITY IS NOT DIFFICULT
SWELLING: I DO NOT HAVE THE SYMPTOM
GO UP STAIRS: ACTIVITY IS MINIMALLY DIFFICULT
RISE FROM A CHAIR: ACTIVITY IS MINIMALLY DIFFICULT
KNEEL ON THE FRONT OF YOUR KNEE: ACTIVITY IS MINIMALLY DIFFICULT
WALK: ACTIVITY IS MINIMALLY DIFFICULT
SQUAT: ACTIVITY IS NOT DIFFICULT

## 2024-07-22 NOTE — PROGRESS NOTES
PHYSICAL THERAPY EVALUATION  Type of Visit: Evaluation                Subjective       Presenting condition or subjective complaint: Knees giving out, slight pain  Date of onset: 07/10/24    Relevant medical history: Anemia; Diabetes; Dizziness; High blood pressure; Osteoarthritis; Sleep disorder like apnea   Dates & types of surgery: Vasectomy 1995. Gull bladder 2022.    Prior diagnostic imaging/testing results: X-ray     Prior therapy history for the same diagnosis, illness or injury: No        Living Environment  Social support: With a significant other or spouse   Type of home: House   Stairs to enter the home: Yes 2 Is there a railing: Yes     Ramp: No   Stairs inside the home: Yes 9 Is there a railing: Yes     Help at home: None  Equipment owned:       Employment: Yes   Hobbies/Interests: Travel, golf    Patient goals for therapy: Feel that the knees wont give out      Patient Name: Farhat Tejada, 63 year old, male  Todays Date: Jul 23, 2024    Referral Diagnosis:  Primary osteoarthritis of both knees    Relevant Medical History:   Patient Active Problem List   Diagnosis    Thalassemia    CARDIOVASCULAR SCREENING; LDL GOAL LESS THAN 160    Elevated liver enzymes    Hyperlipidemia LDL goal <160    Class 1 obesity due to excess calories with body mass index (BMI) of 31.0 to 31.9 in adult, unspecified whether serious comorbidity present    Mild obstructive sleep apnea    Digital mucinous cyst of toe of right foot    Primary osteoarthritis of both knees    Gastroesophageal reflux disease without esophagitis    Cervical radiculopathy    Abnormal LFTs    Diabetes mellitus, type 2 (H)    Family history of type 1 diabetes mellitus    Diabetic polyneuropathy associated with type 2 diabetes mellitus (H)    Tachycardia    Fatty liver disease, nonalcoholic    Gastroesophageal reflux disease with esophagitis, unspecified whether hemorrhage    Hypertension goal BP (blood pressure) < 140/90    Erectile  dysfunction, unspecified erectile dysfunction type   Hx of PT for bursitis     Imaging: X rays    Subjective:    Reason for referral/chief complaint: Pt endorses knee issues. He notes feelings of instability and minimal pain. He notes feeling of weakness when he gets up from sitting, or with walking on treadmill. Sometimes it feels ok sometimes it feels like he will give out.     He notes this has been going on for 1-2 years.  He denies any locking or catching, he denies any swelling. He notes some symptoms on the inside and down a little into the innter knees.           Onset, Mechanism of Injury/Issue: none       Changes in symptoms since onset: worsened    Effects on Sleep: No Issues    Aggravating Factors: walking on even surfaces, going from seated to standing position       Other related Services/Treatments: None    Review of Systems: negative      Occupation: -works on [employer]     Recreational Activities and exercise: walking on treadmills, golfing, travel     Social History: going down stairs, has stairs at home,     Goal for Therapy: improve strength and stability       KNEE EVALUATION      Observation:  POSTURE:     Standing Posture: WNL, Sitting Posture: WNL    Standing Trunk; Pelvic and Hip Alignment: WNL mild foot abduction     Foot/Ankle WB Alignment: WNL slight abduction     Knee WB Alignment:WNL    INTEGUMENTARY (edema, incisions):       GAIT:    Assistive Device(s): None    Gait Deviations: Nonantalgic        ROM:   (Degrees) Left AROM Right AROM   Knee Flexion Within Normal Limits Within Normal Limits   Knee Extension Within Normal Limits Within Normal Limits     FLEXIBILITY:     Hip and Knee Flexibility: Within Normal Limits with the Exception of hamstrings          Neurological Testing:      STRENGTH: Within Normal Limits        PALPATION:         Knees: No pain         SPECIAL TESTS:     Special tests: All WNL     Access Code: [email or web address]  URL: [email or web address]  Date:  07/23/2024  Prepared by:     Exercises  - Side Stepping with Resistance at Feet  - 1 x daily - 2-3 x weekly - 3 sets - 10 reps  - Squat with Chair Touch and Resistance Loop  - 1 x daily - 2-3 x weekly - 2-3 sets - 12-15 reps  - Supine Active Straight Leg Raise  - 1 x daily - 2-3 x weekly - 2-3 sets - 12-15 reps  - Forward Step Down Touch with Heel  - 1 x daily - 2-3 x weekly - 2-3 sets - 12-15 reps    Assessment & Plan   CLINICAL IMPRESSIONS  Medical Diagnosis: Knee OA    Treatment Diagnosis: Knee Pain   Impression/Assessment: Patient is a 63 year old male with bilatearl knee pain  complaints.  The following significant findings have been identified: Pain, Impaired muscle performance, and Decreased activity tolerance. These impairments interfere with their ability to perform self care tasks, work tasks, recreational activities, household chores, household mobility, and community mobility as compared to previous level of function.     Clinical Decision Making (Complexity):  Clinical Presentation: Stable/Uncomplicated  Clinical Presentation Rationale: based on medical and personal factors listed in PT evaluation  Clinical Decision Making (Complexity): Low complexity    PLAN OF CARE  Treatment Interventions:  Interventions: Gait Training, Manual Therapy, Neuromuscular Re-education, Therapeutic Activity, Therapeutic Exercise, Self-Care/Home Management    Long Term Goals     PT Goal 1  Goal Identifier: Sit to stand  Goal Description: Pt will report 90% improvement of symptoms and feelings of instability in knees/hips  Rationale: to maximize safety and independence with performance of ADLs and functional tasks;to maximize safety and independence within the home;to maximize safety and independence within the community;to maximize safety and independence with self cares  Target Date: 10/21/24      Frequency of Treatment: 1x every other week with decreased frequency as needed  Duration of Treatment: 90 Days    Recommended  Referrals to Other Professionals:  None  Education Assessment:        Risks and benefits of evaluation/treatment have been explained.   Patient/Family/caregiver agrees with Plan of Care.     Evaluation Time:     PT Eval, Low Complexity Minutes (51439): 15       Signing Clinician: John Sterling PT

## 2024-07-23 ENCOUNTER — THERAPY VISIT (OUTPATIENT)
Dept: PHYSICAL THERAPY | Facility: CLINIC | Age: 64
End: 2024-07-23
Attending: PHYSICIAN ASSISTANT
Payer: COMMERCIAL

## 2024-07-23 DIAGNOSIS — M17.0 PRIMARY OSTEOARTHRITIS OF BOTH KNEES: ICD-10-CM

## 2024-07-23 PROCEDURE — 97161 PT EVAL LOW COMPLEX 20 MIN: CPT | Mod: GP

## 2024-07-23 PROCEDURE — 97110 THERAPEUTIC EXERCISES: CPT | Mod: GP

## 2024-07-30 ENCOUNTER — ANCILLARY PROCEDURE (OUTPATIENT)
Dept: ULTRASOUND IMAGING | Facility: CLINIC | Age: 64
End: 2024-07-30
Attending: PHYSICIAN ASSISTANT
Payer: COMMERCIAL

## 2024-07-30 DIAGNOSIS — R74.8 ELEVATED LIVER ENZYMES: ICD-10-CM

## 2024-07-30 PROCEDURE — 76700 US EXAM ABDOM COMPLETE: CPT | Mod: TC | Performed by: RADIOLOGY

## 2024-07-31 ENCOUNTER — MYC MEDICAL ADVICE (OUTPATIENT)
Dept: FAMILY MEDICINE | Facility: CLINIC | Age: 64
End: 2024-07-31
Payer: COMMERCIAL

## 2024-07-31 NOTE — TELEPHONE ENCOUNTER
Follow up question for provider based off recent lab result notes:      Helena Magaña PA-C  7/19/2024 11:48 AM CDT       Dear Farhat,     One of your liver tests (the bilirubin) is increasing compared to the last few years (the rest of your liver enzymes are normal).  You had an ultrasound of your liver back on 12/16/22 which showed hepatic steatosis (fatty liver disease).  I suggest we recheck this ultrasound to ensure it has not advanced.  Please call [facility] Sched[facility] at [phone]  to set up the US (no rush).        I suggest limiting alcohol and pain medications as much as possible.  Continue to keep the sugars well controlled.     Iron and hemoglobin levels are normal.     Let's recheck non-fasting labs in 2 months.     Please follow-up if you have any questions or concerns.     Sincerely,     GRECIA Mercado RN on 7/31/2024 at 12:41 PM

## 2024-08-27 ENCOUNTER — THERAPY VISIT (OUTPATIENT)
Dept: PHYSICAL THERAPY | Facility: CLINIC | Age: 64
End: 2024-08-27
Payer: COMMERCIAL

## 2024-08-27 DIAGNOSIS — M17.0 PRIMARY OSTEOARTHRITIS OF BOTH KNEES: Primary | ICD-10-CM

## 2024-08-27 PROCEDURE — 97110 THERAPEUTIC EXERCISES: CPT | Mod: GP

## 2024-09-24 ENCOUNTER — THERAPY VISIT (OUTPATIENT)
Dept: PHYSICAL THERAPY | Facility: CLINIC | Age: 64
End: 2024-09-24
Payer: COMMERCIAL

## 2024-09-24 DIAGNOSIS — M17.0 PRIMARY OSTEOARTHRITIS OF BOTH KNEES: Primary | ICD-10-CM

## 2024-09-24 PROCEDURE — 97110 THERAPEUTIC EXERCISES: CPT | Mod: GP

## 2024-10-01 ENCOUNTER — MYC REFILL (OUTPATIENT)
Dept: FAMILY MEDICINE | Facility: CLINIC | Age: 64
End: 2024-10-01
Payer: COMMERCIAL

## 2024-10-01 DIAGNOSIS — E11.65 TYPE 2 DIABETES MELLITUS WITH HYPERGLYCEMIA, WITHOUT LONG-TERM CURRENT USE OF INSULIN (H): ICD-10-CM

## 2024-10-01 RX ORDER — ATORVASTATIN CALCIUM 40 MG/1
40 TABLET, FILM COATED ORAL DAILY
Qty: 90 TABLET | Refills: 1 | Status: SHIPPED | OUTPATIENT
Start: 2024-10-01

## 2024-10-24 ENCOUNTER — TRANSFERRED RECORDS (OUTPATIENT)
Dept: MULTI SPECIALTY CLINIC | Facility: CLINIC | Age: 64
End: 2024-10-24

## 2024-10-24 LAB — RETINOPATHY: NORMAL

## 2024-10-30 ENCOUNTER — MYC REFILL (OUTPATIENT)
Dept: FAMILY MEDICINE | Facility: CLINIC | Age: 64
End: 2024-10-30
Payer: COMMERCIAL

## 2024-10-30 DIAGNOSIS — E11.65 TYPE 2 DIABETES MELLITUS WITH HYPERGLYCEMIA, WITHOUT LONG-TERM CURRENT USE OF INSULIN (H): ICD-10-CM

## 2024-10-30 DIAGNOSIS — I10 HYPERTENSION GOAL BP (BLOOD PRESSURE) < 140/90: ICD-10-CM

## 2024-10-30 RX ORDER — LISINOPRIL 20 MG/1
20 TABLET ORAL DAILY
Qty: 90 TABLET | Refills: 1 | OUTPATIENT
Start: 2024-10-30

## 2024-11-06 ENCOUNTER — MYC REFILL (OUTPATIENT)
Dept: FAMILY MEDICINE | Facility: CLINIC | Age: 64
End: 2024-11-06
Payer: COMMERCIAL

## 2024-11-06 DIAGNOSIS — E11.65 TYPE 2 DIABETES MELLITUS WITH HYPERGLYCEMIA, WITHOUT LONG-TERM CURRENT USE OF INSULIN (H): ICD-10-CM

## 2024-11-06 DIAGNOSIS — I10 HYPERTENSION GOAL BP (BLOOD PRESSURE) < 140/90: ICD-10-CM

## 2024-11-06 RX ORDER — LISINOPRIL 20 MG/1
20 TABLET ORAL DAILY
Qty: 90 TABLET | Refills: 1 | OUTPATIENT
Start: 2024-11-06

## 2024-11-10 ENCOUNTER — MYC MEDICAL ADVICE (OUTPATIENT)
Dept: FAMILY MEDICINE | Facility: CLINIC | Age: 64
End: 2024-11-10
Payer: COMMERCIAL

## 2024-11-16 ENCOUNTER — HEALTH MAINTENANCE LETTER (OUTPATIENT)
Age: 64
End: 2024-11-16

## 2024-12-16 ENCOUNTER — OFFICE VISIT (OUTPATIENT)
Dept: FAMILY MEDICINE | Facility: CLINIC | Age: 64
End: 2024-12-16
Payer: COMMERCIAL

## 2024-12-16 ENCOUNTER — MYC MEDICAL ADVICE (OUTPATIENT)
Dept: FAMILY MEDICINE | Facility: CLINIC | Age: 64
End: 2024-12-16

## 2024-12-16 VITALS
DIASTOLIC BLOOD PRESSURE: 82 MMHG | SYSTOLIC BLOOD PRESSURE: 128 MMHG | OXYGEN SATURATION: 98 % | HEART RATE: 99 BPM | TEMPERATURE: 97.9 F | HEIGHT: 71 IN | WEIGHT: 200.8 LBS | RESPIRATION RATE: 16 BRPM | BODY MASS INDEX: 28.11 KG/M2

## 2024-12-16 DIAGNOSIS — E11.42 TYPE 2 DIABETES MELLITUS WITH DIABETIC POLYNEUROPATHY, WITHOUT LONG-TERM CURRENT USE OF INSULIN (H): Primary | ICD-10-CM

## 2024-12-16 DIAGNOSIS — N52.9 ERECTILE DYSFUNCTION, UNSPECIFIED ERECTILE DYSFUNCTION TYPE: ICD-10-CM

## 2024-12-16 DIAGNOSIS — I10 HYPERTENSION GOAL BP (BLOOD PRESSURE) < 140/90: ICD-10-CM

## 2024-12-16 DIAGNOSIS — H61.21 IMPACTED CERUMEN OF RIGHT EAR: ICD-10-CM

## 2024-12-16 DIAGNOSIS — K21.00 GASTROESOPHAGEAL REFLUX DISEASE WITH ESOPHAGITIS, UNSPECIFIED WHETHER HEMORRHAGE: ICD-10-CM

## 2024-12-16 DIAGNOSIS — R74.8 ELEVATED LIVER ENZYMES: ICD-10-CM

## 2024-12-16 PROBLEM — D58.1: Status: ACTIVE | Noted: 2024-12-16

## 2024-12-16 PROBLEM — D58.1: Status: RESOLVED | Noted: 2024-12-16 | Resolved: 2024-12-16

## 2024-12-16 LAB
ALBUMIN SERPL BCG-MCNC: 4.6 G/DL (ref 3.5–5.2)
ALP SERPL-CCNC: 75 U/L (ref 40–150)
ALT SERPL W P-5'-P-CCNC: 27 U/L (ref 0–70)
ANION GAP SERPL CALCULATED.3IONS-SCNC: 13 MMOL/L (ref 7–15)
AST SERPL W P-5'-P-CCNC: 31 U/L (ref 0–45)
BILIRUB DIRECT SERPL-MCNC: 0.33 MG/DL (ref 0–0.3)
BILIRUB SERPL-MCNC: 1.1 MG/DL
BUN SERPL-MCNC: 18.4 MG/DL (ref 8–23)
CALCIUM SERPL-MCNC: 10.1 MG/DL (ref 8.8–10.4)
CHLORIDE SERPL-SCNC: 102 MMOL/L (ref 98–107)
CREAT SERPL-MCNC: 1.03 MG/DL (ref 0.67–1.17)
CREAT UR-MCNC: 99.9 MG/DL
EGFRCR SERPLBLD CKD-EPI 2021: 81 ML/MIN/1.73M2
EST. AVERAGE GLUCOSE BLD GHB EST-MCNC: 128 MG/DL
GLUCOSE SERPL-MCNC: 133 MG/DL (ref 70–99)
HAV IGM SERPL QL IA: NONREACTIVE
HBA1C MFR BLD: 6.1 % (ref 0–5.6)
HBV SURFACE AG SERPL QL IA: NONREACTIVE
HCO3 SERPL-SCNC: 25 MMOL/L (ref 22–29)
HCV AB SERPL QL IA: NONREACTIVE
MICROALBUMIN UR-MCNC: <12 MG/L
MICROALBUMIN/CREAT UR: NORMAL MG/G{CREAT}
POTASSIUM SERPL-SCNC: 4.6 MMOL/L (ref 3.4–5.3)
PROT SERPL-MCNC: 7.5 G/DL (ref 6.4–8.3)
SODIUM SERPL-SCNC: 140 MMOL/L (ref 135–145)

## 2024-12-16 PROCEDURE — 99214 OFFICE O/P EST MOD 30 MIN: CPT | Mod: 25 | Performed by: PHYSICIAN ASSISTANT

## 2024-12-16 PROCEDURE — 83036 HEMOGLOBIN GLYCOSYLATED A1C: CPT | Performed by: PHYSICIAN ASSISTANT

## 2024-12-16 PROCEDURE — 69210 REMOVE IMPACTED EAR WAX UNI: CPT | Mod: 4MD | Performed by: PHYSICIAN ASSISTANT

## 2024-12-16 PROCEDURE — 86803 HEPATITIS C AB TEST: CPT | Performed by: PHYSICIAN ASSISTANT

## 2024-12-16 PROCEDURE — 86709 HEPATITIS A IGM ANTIBODY: CPT | Performed by: PHYSICIAN ASSISTANT

## 2024-12-16 PROCEDURE — 82248 BILIRUBIN DIRECT: CPT | Performed by: PHYSICIAN ASSISTANT

## 2024-12-16 PROCEDURE — 82043 UR ALBUMIN QUANTITATIVE: CPT | Performed by: PHYSICIAN ASSISTANT

## 2024-12-16 PROCEDURE — 36415 COLL VENOUS BLD VENIPUNCTURE: CPT | Performed by: PHYSICIAN ASSISTANT

## 2024-12-16 PROCEDURE — 87340 HEPATITIS B SURFACE AG IA: CPT | Performed by: PHYSICIAN ASSISTANT

## 2024-12-16 PROCEDURE — 80053 COMPREHEN METABOLIC PANEL: CPT | Performed by: PHYSICIAN ASSISTANT

## 2024-12-16 PROCEDURE — 82570 ASSAY OF URINE CREATININE: CPT | Performed by: PHYSICIAN ASSISTANT

## 2024-12-16 RX ORDER — METFORMIN HYDROCHLORIDE 500 MG/1
TABLET, EXTENDED RELEASE ORAL
Qty: 270 TABLET | Refills: 1 | Status: SHIPPED | OUTPATIENT
Start: 2024-12-16 | End: 2024-12-16

## 2024-12-16 RX ORDER — LISINOPRIL 20 MG/1
20 TABLET ORAL DAILY
Qty: 90 TABLET | Refills: 1 | Status: SHIPPED | OUTPATIENT
Start: 2024-12-16

## 2024-12-16 RX ORDER — METFORMIN HYDROCHLORIDE 500 MG/1
TABLET, EXTENDED RELEASE ORAL
Qty: 270 TABLET | Refills: 1 | Status: SHIPPED | OUTPATIENT
Start: 2024-12-16

## 2024-12-16 RX ORDER — LISINOPRIL 20 MG/1
20 TABLET ORAL DAILY
Qty: 90 TABLET | Refills: 1 | Status: SHIPPED | OUTPATIENT
Start: 2024-12-16 | End: 2024-12-16

## 2024-12-16 RX ORDER — ATORVASTATIN CALCIUM 40 MG/1
40 TABLET, FILM COATED ORAL DAILY
Qty: 90 TABLET | Refills: 1 | Status: SHIPPED | OUTPATIENT
Start: 2024-12-16

## 2024-12-16 RX ORDER — ASPIRIN 81 MG/1
81 TABLET ORAL DAILY
Qty: 90 TABLET | Refills: 3 | Status: SHIPPED | OUTPATIENT
Start: 2024-12-16

## 2024-12-16 RX ORDER — ASPIRIN 81 MG/1
81 TABLET ORAL DAILY
Qty: 90 TABLET | Refills: 3 | Status: SHIPPED | OUTPATIENT
Start: 2024-12-16 | End: 2024-12-16

## 2024-12-16 RX ORDER — SILDENAFIL CITRATE 20 MG/1
TABLET ORAL
Qty: 20 TABLET | Refills: 1 | Status: SHIPPED | OUTPATIENT
Start: 2024-12-16

## 2024-12-16 NOTE — PROGRESS NOTES
Assessment & Plan     Type 2 diabetes mellitus with diabetic polyneuropathy, without long-term current use of insulin (H)  Sugars stable, will leave meds as is.  - Hemoglobin A1c  - Basic metabolic panel  (Ca, Cl, CO2, Creat, Gluc, K, Na, BUN)  - Lipid panel reflex to direct LDL Fasting; Future  - Hemoglobin A1c; Future  - Comprehensive metabolic panel (BMP + Alb, Alk Phos, ALT, AST, Total. Bili, TP); Future  - CBC with platelets; Future  - atorvastatin (LIPITOR) 40 MG tablet; Take 1 tablet (40 mg) by mouth daily.  - aspirin (ASPIRIN LOW DOSE) 81 MG EC tablet; Take 1 tablet (81 mg) by mouth daily.  - lisinopril (ZESTRIL) 20 MG tablet; Take 1 tablet (20 mg) by mouth daily.  - metFORMIN (GLUCOPHAGE XR) 500 MG 24 hr tablet; Take 1 tab by mouth in am and 2 tabs with dinner.  - blood glucose (NO BRAND SPECIFIED) test strip; Use to test blood sugar 1 times daily as needed or as directed.  - blood glucose (NO BRAND SPECIFIED) lancets standard; Use to test blood sugar 1 times daily or as directed.    Hypertension goal BP (blood pressure) < 140/90  Bp at goal.  Will monitor home readings and if consistently > 140/90 mmHg he is to message and I'll bump up dose to 30 mg daily   - lisinopril (ZESTRIL) 20 MG tablet; Take 1 tablet (20 mg) by mouth daily.    Gastroesophageal reflux disease with esophagitis, unspecified whether hemorrhage  Stable, uses PRN.   - omeprazole (PRILOSEC) 20 MG DR capsule; TAKE 1 CAPSULE(20 MG) BY MOUTH DAILY AS NEEDED FOR HEART BURN    Erectile dysfunction, unspecified erectile dysfunction type  Stable.   - sildenafil (REVATIO) 20 MG tablet; Take 20 mg by mouth daily as needed 30 min to 4 hours before intercourse. Never use with nitroglycerin, terazosin or doxazosin      Elevated liver enzymes  Will recheck levels.   - Hepatitis A antibody IgM  - Hepatitis C Screen Reflex to HCV RNA Quant and Genotype  - Hepatitis B surface antigen  - **Hepatic panel FUTURE 2mo    Impacted cerumen of right  ear  Cerumen Impaction    Wax successfully removed from right ear(s) using irrigation.  I suggest he schedule an hearing evaluation (number given).       - REMOVE IMPACTED CERUMEN          The longitudinal plan of care for the diagnosis(es)/condition(s) as documented were addressed during this visit. Due to the added complexity in care, I will continue to support Farhat in the subsequent management and with ongoing continuity of care.        Subjective   Farhat is a 64 year old, presenting for the following health issues:  Diabetes        12/16/2024     7:30 AM   Additional Questions   Roomed by Huma NG         12/16/2024     7:30 AM   Patient Reported Additional Medications   Patient reports taking the following new medications None per patient     Via the Health Maintenance questionnaire, the patient has reported the following services have been completed -Eye Exam: [facility]. [place] 2024-10-24, this information has been sent to the abstraction team.  History of Present Illness       Diabetes:   He presents for follow up of diabetes.  He is checking home blood glucose a few times a week.   He checks blood glucose before meals.  Blood glucose is never over 200 and never under 70. He is aware of hypoglycemia symptoms including shakiness and dizziness.    He has no concerns regarding his diabetes at this time.  He is having numbness in feet and burning in feet.  The patient has had a diabetic eye exam in the last 12 months. Eye exam performed on Oct 24 2024. Location of last eye exam [facility].        He eats 2-3 servings of fruits and vegetables daily.He consumes 0 sweetened beverage(s) daily.He exercises with enough effort to increase his heart rate 30 to 60 minutes per day.  He exercises with enough effort to increase his heart rate 4 days per week.   He is taking medications regularly.     Noticing a slight increase in sugar levels. Metformin dose was last year.     Home blood pressures: 145/85  "mmHg.  Has been sick for the past couple weeks and home readings    His wife has continued to notice decreased hearing.  Order for hearing test placed in July, he needs the number to call.    He does sometimes get wax in the ears.               Review of Systems  Constitutional, neuro, ENT, endocrine, pulmonary, cardiac, gastrointestinal, genitourinary, musculoskeletal, integument and psychiatric systems are negative, except as otherwise noted.      Objective    /82   Pulse 99   Temp 97.9  F (36.6  C) (Temporal)   Resp 16   Ht 1.816 m (5' 11.5\")   Wt 91.1 kg (200 lb 12.8 oz)   SpO2 98%   BMI 27.62 kg/m    Body mass index is 27.62 kg/m .  Physical Exam   GENERAL: alert and no distress  EYES: Eyes grossly normal to inspection, PERRL and conjunctivae and sclerae normal  HENT: ear canal with increased cerumen on left, clear on right and TM's normal, nose and mouth without ulcers or lesions  NECK: no adenopathy, no asymmetry, masses, or scars  RESP: lungs clear to auscultation - no rales, rhonchi or wheezes  CV: regular rate and rhythm, normal S1 S2, no S3 or S4, no murmur, click or rub, no peripheral edema  MS: no gross musculoskeletal defects noted, no edema  SKIN: no suspicious lesions or rashes  NEURO: Normal strength and tone, mentation intact and speech normal  PSYCH: mentation appears normal, affect normal/bright  Diabetic foot exam: normal DP and PT pulses, no trophic changes or ulcerative lesions, and normal sensory exam    Results for orders placed or performed in visit on 12/16/24 (from the past 24 hours)   Extra Tube *Canceled*    Narrative    The following orders were created for panel order Extra Tube.  Procedure                               Abnormality         Status                     ---------                               -----------         ------                     Extra Blue Top Tube[[id number]]                                                         Extra Green Top " (Lithium...[[id number]]                                                 Extra Purple Top Tube[[id number]]                                                       Extra Urine Collection[[id number]]                                                        Please view results for these tests on the individual orders.   Hemoglobin A1c   Result Value Ref Range    Estimated Average Glucose 128 (H) <117 mg/dL    Hemoglobin A1C 6.1 (H) 0.0 - 5.6 %           Signed Electronically by: Helena Magaña PA-C

## 2025-02-02 ENCOUNTER — MYC REFILL (OUTPATIENT)
Dept: FAMILY MEDICINE | Facility: CLINIC | Age: 65
End: 2025-02-02
Payer: COMMERCIAL

## 2025-02-02 DIAGNOSIS — I10 HYPERTENSION GOAL BP (BLOOD PRESSURE) < 140/90: ICD-10-CM

## 2025-02-02 DIAGNOSIS — E11.42 TYPE 2 DIABETES MELLITUS WITH DIABETIC POLYNEUROPATHY, WITHOUT LONG-TERM CURRENT USE OF INSULIN (H): ICD-10-CM

## 2025-02-02 RX ORDER — LISINOPRIL 20 MG/1
20 TABLET ORAL DAILY
Qty: 90 TABLET | Refills: 0 | Status: SHIPPED | OUTPATIENT
Start: 2025-02-02

## 2025-03-30 ENCOUNTER — HEALTH MAINTENANCE LETTER (OUTPATIENT)
Age: 65
End: 2025-03-30

## 2025-05-05 DIAGNOSIS — I10 HYPERTENSION GOAL BP (BLOOD PRESSURE) < 140/90: ICD-10-CM

## 2025-05-05 DIAGNOSIS — E11.42 TYPE 2 DIABETES MELLITUS WITH DIABETIC POLYNEUROPATHY, WITHOUT LONG-TERM CURRENT USE OF INSULIN (H): ICD-10-CM

## 2025-05-05 RX ORDER — LISINOPRIL 20 MG/1
20 TABLET ORAL DAILY
Qty: 90 TABLET | Refills: 0 | Status: SHIPPED | OUTPATIENT
Start: 2025-05-05

## 2025-05-16 ENCOUNTER — MYC MEDICAL ADVICE (OUTPATIENT)
Dept: FAMILY MEDICINE | Facility: CLINIC | Age: 65
End: 2025-05-16
Payer: COMMERCIAL

## 2025-05-16 DIAGNOSIS — E11.42 TYPE 2 DIABETES MELLITUS WITH DIABETIC POLYNEUROPATHY, WITHOUT LONG-TERM CURRENT USE OF INSULIN (H): ICD-10-CM

## 2025-05-16 DIAGNOSIS — I10 HYPERTENSION GOAL BP (BLOOD PRESSURE) < 140/90: ICD-10-CM

## 2025-05-19 RX ORDER — LISINOPRIL 20 MG/1
30 TABLET ORAL DAILY
Qty: 135 TABLET | Refills: 0 | Status: SHIPPED | OUTPATIENT
Start: 2025-05-19

## 2025-05-19 NOTE — TELEPHONE ENCOUNTER
"Routing my chart message to PCP,     Pt having some BP readings over 140/90, having persistent headache. States average BP is 135/88. Pt is wondering if he can take increase dose of lisinopril when BP measuring high.     From last visit on 12/16:  \"Will monitor home readings and if consistently > 140/90 mmHg he is to message and I'll bump up dose to 30 mg daily.\"     Sandi Daley RN    "

## 2025-06-18 DIAGNOSIS — E11.42 TYPE 2 DIABETES MELLITUS WITH DIABETIC POLYNEUROPATHY, WITHOUT LONG-TERM CURRENT USE OF INSULIN (H): ICD-10-CM

## 2025-06-18 DIAGNOSIS — K21.00 GASTROESOPHAGEAL REFLUX DISEASE WITH ESOPHAGITIS, UNSPECIFIED WHETHER HEMORRHAGE: ICD-10-CM

## 2025-06-18 RX ORDER — OMEPRAZOLE 20 MG/1
CAPSULE, DELAYED RELEASE ORAL
Qty: 90 CAPSULE | Refills: 0 | Status: SHIPPED | OUTPATIENT
Start: 2025-06-18

## 2025-06-18 RX ORDER — ATORVASTATIN CALCIUM 40 MG/1
40 TABLET, FILM COATED ORAL DAILY
Qty: 90 TABLET | Refills: 0 | Status: SHIPPED | OUTPATIENT
Start: 2025-06-18

## 2025-06-23 ENCOUNTER — MYC REFILL (OUTPATIENT)
Dept: FAMILY MEDICINE | Facility: CLINIC | Age: 65
End: 2025-06-23
Payer: COMMERCIAL

## 2025-06-23 DIAGNOSIS — E11.42 TYPE 2 DIABETES MELLITUS WITH DIABETIC POLYNEUROPATHY, WITHOUT LONG-TERM CURRENT USE OF INSULIN (H): ICD-10-CM

## 2025-06-23 DIAGNOSIS — N52.9 ERECTILE DYSFUNCTION, UNSPECIFIED ERECTILE DYSFUNCTION TYPE: ICD-10-CM

## 2025-06-23 RX ORDER — ASPIRIN 81 MG/1
81 TABLET ORAL DAILY
Qty: 90 TABLET | Refills: 1 | Status: SHIPPED | OUTPATIENT
Start: 2025-06-23

## 2025-06-23 RX ORDER — SILDENAFIL CITRATE 20 MG/1
TABLET ORAL
Qty: 20 TABLET | Refills: 0 | Status: SHIPPED | OUTPATIENT
Start: 2025-06-23

## 2025-07-10 ENCOUNTER — OFFICE VISIT (OUTPATIENT)
Dept: FAMILY MEDICINE | Facility: CLINIC | Age: 65
End: 2025-07-10
Attending: PHYSICIAN ASSISTANT
Payer: MEDICARE

## 2025-07-10 VITALS
TEMPERATURE: 97.2 F | OXYGEN SATURATION: 99 % | HEART RATE: 77 BPM | HEIGHT: 72 IN | DIASTOLIC BLOOD PRESSURE: 72 MMHG | RESPIRATION RATE: 16 BRPM | WEIGHT: 204 LBS | BODY MASS INDEX: 27.63 KG/M2 | SYSTOLIC BLOOD PRESSURE: 112 MMHG

## 2025-07-10 DIAGNOSIS — D50.9 IRON DEFICIENCY ANEMIA, UNSPECIFIED IRON DEFICIENCY ANEMIA TYPE: ICD-10-CM

## 2025-07-10 DIAGNOSIS — N52.9 ERECTILE DYSFUNCTION, UNSPECIFIED ERECTILE DYSFUNCTION TYPE: ICD-10-CM

## 2025-07-10 DIAGNOSIS — D56.9 THALASSEMIA, UNSPECIFIED TYPE: ICD-10-CM

## 2025-07-10 DIAGNOSIS — H91.93 BILATERAL HEARING LOSS, UNSPECIFIED HEARING LOSS TYPE: ICD-10-CM

## 2025-07-10 DIAGNOSIS — Z12.5 SCREENING FOR PROSTATE CANCER: ICD-10-CM

## 2025-07-10 DIAGNOSIS — I10 HYPERTENSION GOAL BP (BLOOD PRESSURE) < 140/90: ICD-10-CM

## 2025-07-10 DIAGNOSIS — K21.00 GASTROESOPHAGEAL REFLUX DISEASE WITH ESOPHAGITIS, UNSPECIFIED WHETHER HEMORRHAGE: ICD-10-CM

## 2025-07-10 DIAGNOSIS — Z00.00 ENCOUNTER FOR MEDICARE ANNUAL WELLNESS EXAM: Primary | ICD-10-CM

## 2025-07-10 DIAGNOSIS — R53.83 FATIGUE, UNSPECIFIED TYPE: ICD-10-CM

## 2025-07-10 DIAGNOSIS — E11.42 TYPE 2 DIABETES MELLITUS WITH DIABETIC POLYNEUROPATHY, WITHOUT LONG-TERM CURRENT USE OF INSULIN (H): ICD-10-CM

## 2025-07-10 PROBLEM — N18.2 CKD (CHRONIC KIDNEY DISEASE) STAGE 2, GFR 60-89 ML/MIN: Status: ACTIVE | Noted: 2025-07-10

## 2025-07-10 LAB
ALBUMIN SERPL BCG-MCNC: 4.6 G/DL (ref 3.5–5.2)
ALP SERPL-CCNC: 61 U/L (ref 40–150)
ALT SERPL W P-5'-P-CCNC: 37 U/L (ref 0–70)
ANION GAP SERPL CALCULATED.3IONS-SCNC: 12 MMOL/L (ref 7–15)
AST SERPL W P-5'-P-CCNC: 35 U/L (ref 0–45)
BILIRUB SERPL-MCNC: 1.8 MG/DL
BUN SERPL-MCNC: 17.4 MG/DL (ref 8–23)
CALCIUM SERPL-MCNC: 10.5 MG/DL (ref 8.8–10.4)
CHLORIDE SERPL-SCNC: 104 MMOL/L (ref 98–107)
CHOLEST SERPL-MCNC: 117 MG/DL
CREAT SERPL-MCNC: 1.18 MG/DL (ref 0.67–1.17)
EGFRCR SERPLBLD CKD-EPI 2021: 68 ML/MIN/1.73M2
ERYTHROCYTE [DISTWIDTH] IN BLOOD BY AUTOMATED COUNT: 16 % (ref 10–15)
EST. AVERAGE GLUCOSE BLD GHB EST-MCNC: 123 MG/DL
FASTING STATUS PATIENT QL REPORTED: YES
FASTING STATUS PATIENT QL REPORTED: YES
FERRITIN SERPL-MCNC: 122 NG/ML (ref 31–409)
GLUCOSE SERPL-MCNC: 122 MG/DL (ref 70–99)
HBA1C MFR BLD: 5.9 % (ref 0–5.6)
HCO3 SERPL-SCNC: 24 MMOL/L (ref 22–29)
HCT VFR BLD AUTO: 37.3 % (ref 40–53)
HDLC SERPL-MCNC: 49 MG/DL
HGB BLD-MCNC: 11.8 G/DL (ref 13.3–17.7)
IRON BINDING CAPACITY (ROCHE): 308 UG/DL (ref 240–430)
IRON SATN MFR SERPL: 25 % (ref 15–46)
IRON SERPL-MCNC: 77 UG/DL (ref 61–157)
LDLC SERPL CALC-MCNC: 51 MG/DL
MCH RBC QN AUTO: 19.9 PG (ref 26.5–33)
MCHC RBC AUTO-ENTMCNC: 31.6 G/DL (ref 31.5–36.5)
MCV RBC AUTO: 63 FL (ref 78–100)
NONHDLC SERPL-MCNC: 68 MG/DL
PLATELET # BLD AUTO: 278 10E3/UL (ref 150–450)
POTASSIUM SERPL-SCNC: 5.2 MMOL/L (ref 3.4–5.3)
PROT SERPL-MCNC: 7 G/DL (ref 6.4–8.3)
PSA SERPL DL<=0.01 NG/ML-MCNC: 0.97 NG/ML (ref 0–4.5)
RBC # BLD AUTO: 5.94 10E6/UL (ref 4.4–5.9)
SODIUM SERPL-SCNC: 140 MMOL/L (ref 135–145)
TRIGL SERPL-MCNC: 83 MG/DL
VIT B12 SERPL-MCNC: 445 PG/ML (ref 232–1245)
WBC # BLD AUTO: 6.3 10E3/UL (ref 4–11)

## 2025-07-10 RX ORDER — LISINOPRIL 20 MG/1
30 TABLET ORAL DAILY
Qty: 135 TABLET | Refills: 1 | Status: SHIPPED | OUTPATIENT
Start: 2025-07-10

## 2025-07-10 RX ORDER — METFORMIN HYDROCHLORIDE 500 MG/1
TABLET, EXTENDED RELEASE ORAL
Qty: 270 TABLET | Refills: 1 | Status: SHIPPED | OUTPATIENT
Start: 2025-07-10

## 2025-07-10 RX ORDER — ATORVASTATIN CALCIUM 40 MG/1
40 TABLET, FILM COATED ORAL DAILY
Qty: 90 TABLET | Refills: 1 | Status: SHIPPED | OUTPATIENT
Start: 2025-07-10

## 2025-07-10 RX ORDER — SILDENAFIL CITRATE 20 MG/1
TABLET ORAL
Qty: 20 TABLET | Refills: 1 | Status: SHIPPED | OUTPATIENT
Start: 2025-07-10

## 2025-07-10 RX ORDER — ASPIRIN 81 MG/1
81 TABLET ORAL DAILY
Qty: 90 TABLET | Refills: 1 | Status: SHIPPED | OUTPATIENT
Start: 2025-07-10

## 2025-07-10 RX ORDER — OMEPRAZOLE 20 MG/1
CAPSULE, DELAYED RELEASE ORAL
Qty: 90 CAPSULE | Refills: 1 | Status: SHIPPED | OUTPATIENT
Start: 2025-07-10

## 2025-07-10 SDOH — HEALTH STABILITY: PHYSICAL HEALTH: ON AVERAGE, HOW MANY DAYS PER WEEK DO YOU ENGAGE IN MODERATE TO STRENUOUS EXERCISE (LIKE A BRISK WALK)?: 3 DAYS

## 2025-07-10 SDOH — HEALTH STABILITY: PHYSICAL HEALTH: ON AVERAGE, HOW MANY MINUTES DO YOU ENGAGE IN EXERCISE AT THIS LEVEL?: 30 MIN

## 2025-07-10 ASSESSMENT — SOCIAL DETERMINANTS OF HEALTH (SDOH): HOW OFTEN DO YOU GET TOGETHER WITH FRIENDS OR RELATIVES?: ONCE A WEEK

## 2025-07-10 NOTE — PATIENT INSTRUCTIONS
Patient Education   Preventive Care Advice   This is general advice given by our system to help you stay healthy. However, your care team may have specific advice just for you. Please talk to your care team about your preventive care needs.  Nutrition  Eat 5 or more servings of fruits and vegetables each day.  Try wheat bread, brown rice and whole grain pasta (instead of white bread, rice, and pasta).  Get enough calcium and vitamin D. Check the label on foods and aim for 100% of the RDA (recommended daily allowance).  Lifestyle  Exercise at least 150 minutes each week  (30 minutes a day, 5 days a week).  Do muscle strengthening activities 2 days a week. These help control your weight and prevent disease.  No smoking.  Wear sunscreen to prevent skin cancer.  Have a dental exam and cleaning every 6 months.  Yearly exams  See your health care team every year to talk about:  Any changes in your health.  Any medicines your care team has prescribed.  Preventive care, family planning, and ways to prevent chronic diseases.  Shots (vaccines)   HPV shots (up to age 26), if you've never had them before.  Hepatitis B shots (up to age 59), if you've never had them before.  COVID-19 shot: Get this shot when it's due.  Flu shot: Get a flu shot every year.  Tetanus shot: Get a tetanus shot every 10 years.  Pneumococcal, hepatitis A, and RSV shots: Ask your care team if you need these based on your risk.  Shingles shot (for age 50 and up)  General health tests  Diabetes screening:  Starting at age 35, Get screened for diabetes at least every 3 years.  If you are younger than age 35, ask your care team if you should be screened for diabetes.  Cholesterol test: At age 39, start having a cholesterol test every 5 years, or more often if advised.  Bone density scan (DEXA): At age 50, ask your care team if you should have this scan for osteoporosis (brittle bones).  Hepatitis C: Get tested at least once in your life.  STIs (sexually  transmitted infections)  Before age 24: Ask your care team if you should be screened for STIs.  After age 24: Get screened for STIs if you're at risk. You are at risk for STIs (including HIV) if:  You are sexually active with more than one person.  You don't use condoms every time.  You or a partner was diagnosed with a sexually transmitted infection.  If you are at risk for HIV, ask about PrEP medicine to prevent HIV.  Get tested for HIV at least once in your life, whether you are at risk for HIV or not.  Cancer screening tests  Cervical cancer screening: If you have a cervix, begin getting regular cervical cancer screening tests starting at age 21.  Breast cancer scan (mammogram): If you've ever had breasts, begin having regular mammograms starting at age 40. This is a scan to check for breast cancer.  Colon cancer screening: It is important to start screening for colon cancer at age 45.  Have a colonoscopy test every 10 years (or more often if you're at risk) Or, ask your provider about stool tests like a FIT test every year or Cologuard test every 3 years.  To learn more about your testing options, visit:   .  For help making a decision, visit:   https://bit.ly/qc05575.  Prostate cancer screening test: If you have a prostate, ask your care team if a prostate cancer screening test (PSA) at age 55 is right for you.  Lung cancer screening: If you are a current or former smoker ages 50 to 80, ask your care team if ongoing lung cancer screenings are right for you.  For informational purposes only. Not to replace the advice of your health care provider. Copyright   2023 [facility]. All rights reserved. Clinically reviewed by the [facility] Transitions Program. [employer] [id number] - REV 01/24.  Preventing Falls: Care Instructions  Injuries and health problems such as trouble walking or poor eyesight can increase your risk of falling. So can some medicines. But there are things you can do to help  "prevent falls. You can exercise to get stronger. You can also arrange your home to make it safer.    Talk to your doctor about the medicines you take. Ask if any of them increase the risk of falls and whether they can be changed or stopped.   Try to exercise regularly. It can help improve your strength and balance. This can help lower your risk of falling.         Practice fall safety and prevention.   Wear low-heeled shoes that fit well and give your feet good support. Talk to your doctor if you have foot problems that make this hard.  Carry a cellphone or wear a medical alert device that you can use to call for help.  Use stepladders instead of chairs to reach high objects. Don't climb if you're at risk for falls. Ask for help, if needed.  Wear the correct eyeglasses, if you need them.        Make your home safer.   Remove rugs, cords, clutter, and furniture from walkways.  Keep your house well lit. Use night-lights in hallways and bathrooms.  Install and use sturdy handrails on stairways.  Wear nonskid footwear, even inside. Don't walk barefoot or in socks without shoes.        Be safe outside.   Use handrails, curb cuts, and ramps whenever possible.  Keep your hands free by using a shoulder bag or backpack.  Try to walk in well-lit areas. Watch out for uneven ground, changes in pavement, and debris.  Be careful in the winter. Walk on the grass or gravel when sidewalks are slippery. Use de-icer on steps and walkways. Add non-slip devices to shoes.    Put grab bars and nonskid mats in your shower or tub and near the toilet. Try to use a shower chair or bath bench when bathing.   Get into a tub or shower by putting in your weaker leg first. Get out with your strong side first. Have a phone or medical alert device in the bathroom with you.   Where can you learn more?  Go to https://www.[employer].net/patiented  Enter G117 in the search box to learn more about \"Preventing Falls: Care Instructions.\"  Current as of: " July 31, 2024  Content Version: 14.5    1989-0919 [employer].   Care instructions adapted under license by your healthcare professional. If you have questions about a medical condition or this instruction, always ask your healthcare professional. [employer] disclaims any warranty or liability for your use of this information.    Hearing Loss: Care Instructions  Overview     Hearing loss is a sudden or slow decrease in how well you hear. It can range from slight to profound. Permanent hearing loss can occur with aging. It also can happen when you are exposed long-term to loud noise. Examples include listening to loud music, riding motorcycles, or being around other loud machines.  Hearing loss can affect your work and home life. It can make you feel lonely or depressed. You may feel that you have lost your independence. But hearing aids and other devices can help you hear better and feel connected to others.  Follow-up care is a key part of your treatment and safety. Be sure to make and go to all appointments, and call your doctor if you are having problems. It's also a good idea to know your test results and keep a list of the medicines you take.  How can you care for yourself at home?  Avoid loud noises whenever possible. This helps keep your hearing from getting worse.  Always wear hearing protection around loud noises.  Wear a hearing aid as directed.  A professional can help you pick a hearing aid that will work best for you.  You can also get hearing aids over the counter for mild to moderate hearing loss.  Have hearing tests as your doctor suggests. They can show whether your hearing has changed. Your hearing aid may need to be adjusted.  Use other devices as needed. These may include:  Telephone amplifiers and hearing aids that can connect to a television, stereo, radio, or microphone.  Devices that use lights or vibrations. These alert you to the doorbell, a ringing telephone, or a  "baby monitor.  Television closed-captioning. This shows the words at the bottom of the screen. Most new TVs can do this.  TTY (text telephone). This lets you type messages back and forth on the telephone instead of talking or listening. These devices are also called TDD. When messages are typed on the keyboard, they are sent over the phone line to a receiving TTY. The message is shown on a monitor.  Use text messaging, social media, and email if it is hard for you to communicate by telephone.  Try to learn a listening technique called speechreading. It is not lipreading. You pay attention to people's gestures, expressions, posture, and tone of voice. These clues can help you understand what a person is saying. Face the person you are talking to, and have them face you. Make sure the lighting is good. You need to see the other person's face clearly.  Think about counseling if you need help to adjust to your hearing loss.  When should you call for help?  Watch closely for changes in your health, and be sure to contact your doctor if:    You think your hearing is getting worse.     You have new symptoms, such as dizziness or nausea.   Where can you learn more?  Go to https://www.[address].net/patiented  Enter R798 in the search box to learn more about \"Hearing Loss: Care Instructions.\"  Current as of: October 27, 2024  Content Version: 14.5 2024-2025 [employer].   Care instructions adapted under license by your healthcare professional. If you have questions about a medical condition or this instruction, always ask your healthcare professional. [employer] disclaims any warranty or liability for your use of this information.       "

## 2025-07-10 NOTE — PROGRESS NOTES
Preventive Care Visit  [facility] ASHLEE Magaña PA-C, Family Medicine  Jul 10, 2025      Assessment & Plan     Encounter for Medicare annual wellness exam      HEALTH CARE MAINTENANCE              Reviewed USPTF recommendations and anticipatory guidance.              See orders.   IPneumonia shot due.     Type 2 diabetes mellitus with diabetic polyneuropathy, without long-term current use of insulin (H)  Sugars well controlled, leave as is.   - Hemoglobin A1c  - Lipid panel reflex to direct LDL Fasting  - Comprehensive metabolic panel (BMP + Alb, Alk Phos, ALT, AST, Total. Bili, TP)  - CBC with platelets  - Hemoglobin A1c; Future  - Basic metabolic panel  (Ca, Cl, CO2, Creat, Gluc, K, Na, BUN); Future  - metFORMIN (GLUCOPHAGE XR) 500 MG 24 hr tablet; Take 1 tab by mouth in am and 2 tabs with dinner.  - lisinopril (ZESTRIL) 20 MG tablet; Take 1.5 tablets (30 mg) by mouth daily.  - atorvastatin (LIPITOR) 40 MG tablet; Take 1 tablet (40 mg) by mouth daily.  - aspirin 81 MG EC tablet; Take 1 tablet (81 mg) by mouth daily.    Thalassemia, unspecified type  Stable.   - Vitamin B12    Screening for prostate cancer  Discussed screening for prostate cancer, which has changed in the past few years.        Routine screening with PSA remains controversial.  Monitoring PSA levels and if elevated f/up with prostate MRI/biopsy or urology referral is a screening option.    PSA was elevated in 2023 and then normalized when he saw urology 3/5/24    - PSA, screen    Fatigue, unspecified type  Hemoglobin is slightly low, will check iron levels.     Bilateral hearing loss, unspecified hearing loss type  Discussed importance of screening for hearing loss and correcting if needed  - Adult Audiology  Referral; Future      Iron deficiency anemia, unspecified iron deficiency anemia type  Will recheck levels, he does take a supplement.   - Iron and iron binding capacity  - Ferritin    Hypertension goal BP  "(blood pressure) < 140/90  BP stable.  He does report some dizziness when he stands.  Orthostatic BP's normal today.  Will monitor and discuss that dose may need to decrease if this worsens.   - lisinopril (ZESTRIL) 20 MG tablet; Take 1.5 tablets (30 mg) by mouth daily.    Gastroesophageal reflux disease with esophagitis, unspecified whether hemorrhage  Stable.   - omeprazole (PRILOSEC) 20 MG DR capsule; TAKE 1 CAPSULE(20 MG) BY MOUTH DAILY AS NEEDED FOR HEART BURN    Erectile dysfunction, unspecified erectile dysfunction type  Uses PRN  - sildenafil (REVATIO) 20 MG tablet; Take 20 mg by mouth daily as needed 30 min to 4 hours before intercourse. Never use with nitroglycerin, terazosin or doxazosin  Patient has been advised of split billing requirements and indicates understanding: Yes    BMI  Estimated body mass index is 28.06 kg/m  as calculated from the following:    Height as of this encounter: 1.816 m (5' 11.5\").    Weight as of this encounter: 92.5 kg (204 lb).   Weight management plan: Discussed healthy diet and exercise guidelines    Counseling  Appropriate preventive services were addressed with this patient via screening, questionnaire, or discussion as appropriate for fall prevention, nutrition, physical activity, Tobacco-use cessation, social engagement, weight loss and cognition.  Checklist reviewing preventive services available has been given to the patient.  Reviewed patient's diet, addressing concerns and/or questions.   He is at risk for lack of exercise and has been provided with information to increase physical activity for the benefit of his well-being.   The patient was provided with written information regarding signs of hearing loss.   Reviewed preventive health counseling, as reflected in patient instructions       Regular exercise       Healthy diet/nutrition       Vision screening       Hearing screening       Advance Care Planning    The longitudinal plan of care for the " diagnosis(es)/condition(s) as documented were addressed during this visit. Due to the added complexity in care, I will continue to support Farhat in the subsequent management and with ongoing continuity of care.      Subjective   Farhat is a 64 year old, presenting for the following:  Physical        7/10/2025     6:57 AM   Additional Questions   Roomed by Ethel   Accompanied by Self         7/10/2025     6:57 AM   Patient Reported Additional Medications   Patient reports taking the following new medications NA          HPI       Patient with history of Diabetes and Hypertension arrived for Annual Physical.    Labs collected upon arrival.     Sometimes will feel dizzy upon standing.  This occurs approximately once per week.  No falls.  Has been present for 30+ years (no changes).    Diabetes Follow-up    How often are you checking your blood sugar? A few times a week  What time of day are you checking your blood sugars (select all that apply)?  Before meals  Have you had any blood sugars above 200?  No  Have you had any blood sugars below 70?  No  What symptoms do you notice when your blood sugar is low?  None  What concerns do you have today about your diabetes? None   Do you have any of these symptoms? (Select all that apply)  Numbness in feet      BP Readings from Last 2 Encounters:   07/10/25 112/72   12/16/24 128/82     Hemoglobin A1C (%)   Date Value   12/16/2024 6.1 (H)   07/10/2024 5.8 (H)   08/23/2019 6.0 (H)   02/11/2019 6.1 (H)     LDL Cholesterol Calculated (mg/dL)   Date Value   07/10/2024 52   04/20/2023 31   01/10/2020 107 (H)   09/05/2018 129 (H)             Hypertension Follow-up    Do you check your blood pressure regularly outside of the clinic? Yes   Are you following a low salt diet? No  Are your blood pressures ever more than 140 on the top number (systolic) OR more   than 90 on the bottom number (diastolic), for example 140/90? Yes    BP Readings from Last 2 Encounters:   07/10/25 112/72    12/16/24 128/82       Advance Care Planning    Discussed advance care planning with patient; informed AVS has link to [employer].        7/10/2025   General Health   How would you rate your overall physical health? Good   Feel stress (tense, anxious, or unable to sleep) Only a little   (!) STRESS CONCERN      7/10/2025   Nutrition   Diet: Diabetic         7/10/2025   Exercise   Days per week of moderate/strenous exercise 3 days   Average minutes spent exercising at this level 30 min         7/10/2025   Social Factors   Frequency of gathering with friends or relatives Once a week   Worry food won't last until get money to buy more No   Food not last or not have enough money for food? No   Do you have housing? (Housing is defined as stable permanent housing and does not include staying outside in a car, in a tent, in an abandoned building, in an overnight shelter, or couch-surfing.) Yes   Are you worried about losing your housing? No   Lack of transportation? No   Unable to get utilities (heat,electricity)? No         7/10/2025   Fall Risk   Fallen 2 or more times in the past year? No   Trouble with walking or balance? No   Gait Speed Test (Document in seconds) 3.59   Gait Speed Test Interpretation Less than or equal to 5.00 seconds - PASS          7/10/2025   Activities of Daily Living- Home Safety   Needs help with the following daily activites None of the above   Safety concerns in the home None of the above         7/10/2025   Dental   Dentist two times every year? Yes         7/10/2025   Hearing Screening   Hearing concerns? (!) IT'S HARD TO FOLLOW A CONVERSATION IN A NOISY RESTAURANT OR CROWDED ROOM.   Would you like a referral for hearing testing? No         7/10/2025   Driving Risk Screening   Patient/family members have concerns about driving No         7/10/2025   General Alertness/Fatigue Screening   Have you been more tired than usual lately? No         7/10/2025   Urinary Incontinence Screening    Bothered by leaking urine in past 6 months No         Today's PHQ-2 Score:       7/10/2025     6:49 AM   PHQ-2 ( 1999 [employer])   Q1: Little interest or pleasure in doing things 0   Q2: Feeling down, depressed or hopeless 0   PHQ-2 Score 0    Q1: Little interest or pleasure in doing things Not at all   Q2: Feeling down, depressed or hopeless Not at all   PHQ-2 Score 0       Patient-reported           7/10/2025   Substance Use   Alcohol more than 3/day or more than 7/wk No   Do you have a current opioid prescription? No   How severe/bad is pain from 1 to 10? 0/10 (No Pain)   Do you use any other substances recreationally? No     Social History     Tobacco Use    Smoking status: Never    Smokeless tobacco: Never   Vaping Use    Vaping status: Never Used   Substance Use Topics    Alcohol use: Yes     Comment: occasional    Drug use: No           7/10/2025   AAA Screening   Family history of Abdominal Aortic Aneurysm (AAA)? No   Last PSA:   PSA   Date Value Ref Range Status   01/10/2020 0.70 0 - 4 ug/L Final     Comment:     Assay Method:  Chemiluminescence using Siemens Vista analyzer     Prostate Specific Antigen Screen   Date Value Ref Range Status   03/05/2024 0.88 0.00 - 4.50 ng/mL Final     ASCVD Risk   The ASCVD Risk score (Jordon DK, et al., 2019) failed to calculate for the following reasons:    The valid total cholesterol range is 130 to 320 mg/dL            Reviewed and updated as needed this visit by Provider                    Past Medical History:   Diagnosis Date    Diabetes (H)     HLD (hyperlipidemia)     Obesity     Sleep apnea     does not like to use CPAP     Past Surgical History:   Procedure Laterality Date    CHOLECYSTECTOMY  03/23/2022    COLONOSCOPY N/A 12/20/2023    Procedure: COLONOSCOPY, WITH POLYPECTOMY;  Surgeon: Nabor Barker MD;  Location: [facility]    ENDOSCOPIC RETROGRADE CHOLANGIOPANCREATOGRAM N/A 03/22/2022    Procedure: ENDOSCOPIC RETROGRADE CHOLANGIOPANCREATOGRAPHY, BILIARY  SPHINCTEROTOMY STONE EXTRACTION;  Surgeon: Farhat Rodriguez MD;  [place] [facility] [place]    ESOPHAGOSCOPY, GASTROSCOPY, DUODENOSCOPY (EGD), COMBINED N/A 03/22/2022    Procedure: ESOPHAGOGASTRODUODENOSCOPY, WITH FINE NEEDLE ASPIRATION BIOPSY, WITH ENDOSCOPIC ULTRASOUND GUIDANCE;  Surgeon: Farhat Rodriguez MD;  [place] [facility] [place]    ESOPHAGOSCOPY, GASTROSCOPY, DUODENOSCOPY (EGD), COMBINED N/A 12/20/2023    Procedure: ESOPHAGOGASTRODUODENOSCOPY, WITH BIOPSY;  Surgeon: Nabor Barker MD;  Location:  GI    LAPAROSCOPIC CHOLECYSTECTOMY N/A 03/23/2022    Procedure: CHOLECYSTECTOMY, LAPAROSCOPIC;  Surgeon: Branden Ponce DO;  [place] [facility] [place]    VASECTOMY      VASECTOMY  1999    [facility]       Lab work is in process  Current providers sharing in care for this patient include:  Patient Care Team:  Helena Magaña PA-C as PCP - General (Physician Assistant - Medical)  Helena Magaña PA-C as Assigned PCP  Rina Gupta RD as Diabetes Educator (Dietitian, Registered)  Stuart Bellamy MD as MD (Hematology & Oncology)  Carin Huntley MD as MD (Hematology & Oncology)  Sulaiman Boss PA-C as Physician Assistant (Gastroenterology)  Shamika Foss MD as Assigned Surgical Provider    The following health maintenance items are reviewed in Epic and correct as of today:  Health Maintenance   Topic Date Due    PNEUMOCOCCAL VACCINE 50+ YEARS (1 of 2 - PCV) Never done    A1C  03/16/2025    COVID-19 VACCINE (7 - 2024-25 season) 03/30/2025    MEDICARE ANNUAL WELLNESS VISIT  07/10/2025    LIPID  07/10/2025    ANNUAL REVIEW OF HM ORDERS  07/10/2025    VITAMIN B12  07/10/2025    INFLUENZA VACCINE (1) 09/01/2025    EYE EXAM  10/24/2025    BMP  12/16/2025    MICROALBUMIN  12/16/2025    DIABETIC FOOT EXAM  12/16/2025    DTAP/TDAP/TD VACCINE (2 - Td or Tdap) 06/02/2026    FALL RISK ASSESSMENT  07/10/2026    COLORECTAL CANCER SCREENING  12/20/2028    ADVANCE CARE PLANNING  07/10/2029  "   HEPATITIS C SCREENING  Completed    HIV SCREENING  Completed    PHQ-2 (once per calendar year)  Completed    ZOSTER VACCINE  Completed    RSV VACCINE  Completed    HPV VACCINE  Aged Out    MENINGITIS VACCINE  Aged Out         Review of Systems  Constitutional, neuro, ENT, endocrine, pulmonary, cardiac, gastrointestinal, genitourinary, musculoskeletal, integument and psychiatric systems are negative, except as otherwise noted.     Objective    Exam  /72 (BP Location: Left arm, Patient Position: Chair, Cuff Size: Adult Large)   Pulse 77   Temp 97.2  F (36.2  C) (Temporal)   Resp 16   Ht 1.816 m (5' 11.5\")   Wt 92.5 kg (204 lb)   SpO2 99%   BMI 28.06 kg/m     Estimated body mass index is 28.06 kg/m  as calculated from the following:    Height as of this encounter: 1.816 m (5' 11.5\").    Weight as of this encounter: 92.5 kg (204 lb).    Physical Exam  GENERAL: alert and no distress  EYES: Eyes grossly normal to inspection, PERRL and conjunctivae and sclerae normal  HENT: ear canals and TM's normal, nose and mouth without ulcers or lesions  NECK: no adenopathy, no asymmetry, masses, or scars  RESP: lungs clear to auscultation - no rales, rhonchi or wheezes  CV: regular rate and rhythm, normal S1 S2, no S3 or S4, no murmur, click or rub, no peripheral edema  ABDOMEN: soft, nontender, no hepatosplenomegaly, no masses and bowel sounds normal  MS: no gross musculoskeletal defects noted, no edema  SKIN: no suspicious lesions or rashes  NEURO: Normal strength and tone, mentation intact and speech normal  PSYCH: mentation appears normal, affect normal/bright         7/10/2025   Mini Cog   Clock Draw Score 2 Normal   3 Item Recall 2 objects recalled   Mini Cog Total Score 4         Vision Screen  Vision Screen Results: Pass  No Corrective Lenses, PLUS LENS REQUIRED: Pass      Signed Electronically by: Helena Magaña PA-C    "

## 2025-07-11 ENCOUNTER — TELEPHONE (OUTPATIENT)
Dept: FAMILY MEDICINE | Facility: CLINIC | Age: 65
End: 2025-07-11
Payer: MEDICARE

## 2025-07-11 NOTE — TELEPHONE ENCOUNTER
[facility] Prior Authorization Team  Phone: [phone]    PRIOR AUTHORIZATION DENIED    Medication: SILDENAFIL CITRATE 20 MG PO TABS  Insurance Company: [employer] Phone [phone] Fax [phone]  Denial Date: 7/11/2025  Denial Reason(s):         Appeal Information:           Patient Notified: NO- Unfortunately, we cannot call the patient with denials because we do not know what next steps the MD will take nor can we give medical advice, please notify the patient of what they are to expect for the continuation of their therapy from the provider.

## 2025-07-14 ENCOUNTER — PATIENT OUTREACH (OUTPATIENT)
Dept: CARE COORDINATION | Facility: CLINIC | Age: 65
End: 2025-07-14
Payer: MEDICARE

## (undated) DEVICE — TIP CAUTERY L HOOK 36CM E377336C

## (undated) DEVICE — Device

## (undated) DEVICE — SUCTION MANIFOLD NEPTUNE 2 SYS 1 PORT 702-025-000

## (undated) DEVICE — SNARE CAPIVATOR ROUND COLD SNR BX10 M00561101

## (undated) DEVICE — ADH SKIN CLOSURE PREMIERPRO EXOFIN 1.0ML 3470

## (undated) DEVICE — SU VICRYL+ 0 27 UR6 VLT VCP603H

## (undated) DEVICE — SYSTEM LAPAROVUE VISIBILITY LAPVUE10

## (undated) DEVICE — TUBING SUCTION 12"X1/4" N612

## (undated) DEVICE — ENDO TROCAR FIRST ENTRY KII FIOS Z-THRD 11X100MM CTF33

## (undated) DEVICE — TUBING SMOKE EVAC PNEUMOCLEAR HIGH FLOW 0620050250

## (undated) DEVICE — TUBING LAP SUCT/IRRIG STRYKER 250070500

## (undated) DEVICE — BALLOON EXTRACTION 15X1950MM 3.2MM TL B-V243Q-A

## (undated) DEVICE — SOL WATER IRRIG 1000ML BOTTLE 2F7114

## (undated) DEVICE — WIRE GUIDE 0.35X270CM STRAIGHT TIP VISIGLIDE G-260-3527S

## (undated) DEVICE — SU VICRYL+ 3-0 27IN SH UND VCP416H

## (undated) DEVICE — GLOVE UNDER INDICATOR PI SZ 7.0 LF 41670

## (undated) DEVICE — PLATE GROUNDING ADULT W/CORD 9165L

## (undated) DEVICE — ENDO FORCEP ENDOJAW BIOPSY 2.8MMX230CM FB-220U

## (undated) DEVICE — ENDO TROCAR FIRST ENTRY KII FIOS Z-THRD 05X100MM CTF03

## (undated) DEVICE — DECANTER VIAL 2006S

## (undated) DEVICE — NDL INSUFFLATION 13GA 120MM C2201

## (undated) DEVICE — PREP CHLORAPREP 26ML TINTED HI-LITE ORANGE 930815

## (undated) DEVICE — TUBING SUCTION MEDI-VAC 1/4"X20' N620A - HE

## (undated) DEVICE — ENDO TROCAR SLEEVE KII Z-THREADED 05X100MM CTS02

## (undated) DEVICE — TUBING SUCTION 6"X3/16" N56A

## (undated) DEVICE — LUBRICATING JELLY 4.25OZ

## (undated) DEVICE — SUTURE VICRYL+ 4-0 UNDYED PS-2 VCP496H

## (undated) DEVICE — ENDO FUSION OMNI-TOME G31903

## (undated) DEVICE — KIT ENDO TURNOVER/PROCEDURE CARRY-ON 101822

## (undated) DEVICE — ENDO SHEARS RENEW LAP ENDOCUT SCISSOR TIP 16.5MM 3142

## (undated) DEVICE — CUSTOM PACK LAP CHOLE SBA5BLCHEA

## (undated) DEVICE — BLADE SCALPEL SURG #11 DISP 372611

## (undated) DEVICE — CLIP APPLIER ENDO ROTATING 10MM MED/LG ER320

## (undated) DEVICE — ENDO POUCH UNIV RETRIEVAL SYSTEM INZII 10MM CD001

## (undated) DEVICE — SYR 30ML LL W/O NDL 302832

## (undated) DEVICE — ESU PENCIL SMOKE EVAC W/ROCKER SWITCH 0703-047-000

## (undated) RX ORDER — LIDOCAINE HYDROCHLORIDE 20 MG/ML
INJECTION, SOLUTION INFILTRATION; PERINEURAL
Status: DISPENSED
Start: 2022-03-23

## (undated) RX ORDER — FENTANYL CITRATE 50 UG/ML
INJECTION, SOLUTION INTRAMUSCULAR; INTRAVENOUS
Status: DISPENSED
Start: 2022-03-22

## (undated) RX ORDER — PROPOFOL 10 MG/ML
INJECTION, EMULSION INTRAVENOUS
Status: DISPENSED
Start: 2023-12-20

## (undated) RX ORDER — ONDANSETRON 2 MG/ML
INJECTION INTRAMUSCULAR; INTRAVENOUS
Status: DISPENSED
Start: 2022-03-23

## (undated) RX ORDER — PROPOFOL 10 MG/ML
INJECTION, EMULSION INTRAVENOUS
Status: DISPENSED
Start: 2022-03-23

## (undated) RX ORDER — KETAMINE HYDROCHLORIDE 10 MG/ML
INJECTION INTRAMUSCULAR; INTRAVENOUS
Status: DISPENSED
Start: 2022-03-22

## (undated) RX ORDER — NALOXONE HYDROCHLORIDE 0.4 MG/ML
INJECTION, SOLUTION INTRAMUSCULAR; INTRAVENOUS; SUBCUTANEOUS
Status: DISPENSED
Start: 2022-03-22

## (undated) RX ORDER — DEXAMETHASONE SODIUM PHOSPHATE 10 MG/ML
INJECTION INTRAMUSCULAR; INTRAVENOUS
Status: DISPENSED
Start: 2022-03-23

## (undated) RX ORDER — ONDANSETRON 2 MG/ML
INJECTION INTRAMUSCULAR; INTRAVENOUS
Status: DISPENSED
Start: 2022-03-22

## (undated) RX ORDER — FENTANYL CITRATE 50 UG/ML
INJECTION, SOLUTION INTRAMUSCULAR; INTRAVENOUS
Status: DISPENSED
Start: 2022-03-23

## (undated) RX ORDER — FENTANYL CITRATE-0.9 % NACL/PF 10 MCG/ML
PLASTIC BAG, INJECTION (ML) INTRAVENOUS
Status: DISPENSED
Start: 2022-03-22

## (undated) RX ORDER — LIDOCAINE HYDROCHLORIDE AND EPINEPHRINE 10; 10 MG/ML; UG/ML
INJECTION, SOLUTION INFILTRATION; PERINEURAL
Status: DISPENSED
Start: 2022-03-23

## (undated) RX ORDER — DEXAMETHASONE SODIUM PHOSPHATE 10 MG/ML
INJECTION INTRAMUSCULAR; INTRAVENOUS
Status: DISPENSED
Start: 2022-03-22

## (undated) RX ORDER — PROPOFOL 10 MG/ML
INJECTION, EMULSION INTRAVENOUS
Status: DISPENSED
Start: 2022-03-22